# Patient Record
Sex: MALE | Race: ASIAN | NOT HISPANIC OR LATINO | Employment: FULL TIME | URBAN - METROPOLITAN AREA
[De-identification: names, ages, dates, MRNs, and addresses within clinical notes are randomized per-mention and may not be internally consistent; named-entity substitution may affect disease eponyms.]

---

## 2018-02-12 ENCOUNTER — OFFICE VISIT (OUTPATIENT)
Dept: FAMILY MEDICINE | Facility: CLINIC | Age: 58
End: 2018-02-12
Payer: COMMERCIAL

## 2018-02-12 VITALS — DIASTOLIC BLOOD PRESSURE: 79 MMHG | TEMPERATURE: 97.9 F | SYSTOLIC BLOOD PRESSURE: 148 MMHG

## 2018-02-12 DIAGNOSIS — Z71.84 TRAVEL ADVICE ENCOUNTER: Primary | ICD-10-CM

## 2018-02-12 DIAGNOSIS — Z23 NEED FOR VACCINATION: ICD-10-CM

## 2018-02-12 PROCEDURE — 90472 IMMUNIZATION ADMIN EACH ADD: CPT | Mod: GA | Performed by: NURSE PRACTITIONER

## 2018-02-12 PROCEDURE — 90471 IMMUNIZATION ADMIN: CPT | Mod: GA | Performed by: NURSE PRACTITIONER

## 2018-02-12 PROCEDURE — 99402 PREV MED CNSL INDIV APPRX 30: CPT | Mod: 25 | Performed by: NURSE PRACTITIONER

## 2018-02-12 PROCEDURE — 90632 HEPA VACCINE ADULT IM: CPT | Mod: GA | Performed by: NURSE PRACTITIONER

## 2018-02-12 PROCEDURE — 90691 TYPHOID VACCINE IM: CPT | Mod: GA | Performed by: NURSE PRACTITIONER

## 2018-02-12 RX ORDER — ATOVAQUONE AND PROGUANIL HYDROCHLORIDE 250; 100 MG/1; MG/1
1 TABLET, FILM COATED ORAL DAILY
Qty: 80 TABLET | Refills: 0 | Status: SHIPPED | OUTPATIENT
Start: 2018-02-12 | End: 2018-02-28

## 2018-02-12 RX ORDER — HYDROCHLOROTHIAZIDE 25 MG/1
25 TABLET ORAL DAILY
Qty: 90 TABLET | Refills: 3 | COMMUNITY
Start: 2018-02-12 | End: 2019-03-04

## 2018-02-12 RX ORDER — DOXYCYCLINE 100 MG/1
100 TABLET ORAL DAILY
Qty: 108 TABLET | Refills: 0 | Status: SHIPPED | OUTPATIENT
Start: 2018-02-12 | End: 2018-02-28

## 2018-02-12 RX ORDER — SIMVASTATIN 20 MG
20 TABLET ORAL AT BEDTIME
Qty: 30 TABLET | Refills: 1 | COMMUNITY
Start: 2018-02-12 | End: 2018-02-28

## 2018-02-12 NOTE — MR AVS SNAPSHOT
"              After Visit Summary   2/12/2018    Jose Gaines    MRN: [id number]           Patient Information     Date Of Birth          1960        Visit Information        Provider Department      2/12/2018 9:00 AM Crystal Mariscal APRN CNP [facility]        Today's Diagnoses     Travel advice encounter    -  1    Need for vaccination          Care Instructions    Today February 12, 2018 you received the    Hepatitis A Vaccine -     Typhoid - injectable. This vaccine is valid for two years.   .    These appointments can be made as a NURSE ONLY visit.    **It is very important for the vaccinations to be given on the scheduled day(s), this helps ensure you receive the full effectiveness of the vaccine.**    Please call [facility] with any questions [phone]    Thank you for visiting [facility]              Follow-ups after your visit        Who to contact     If you have questions or need follow up information about today's clinic visit or your schedule please contact [facility] directly at [phone].  Normal or non-critical lab and imaging results will be communicated to you by MyChart, letter or phone within 4 business days after the clinic has received the results. If you do not hear from us within 7 days, please contact the clinic through [employer] or phone. If you have a critical or abnormal lab result, we will notify you by phone as soon as possible.  Submit refill requests through Oco or call your pharmacy and they will forward the refill request to us. Please allow 3 business days for your refill to be completed.          Additional Information About Your Visit        MyChart Information     Oco lets you send messages to your doctor, view your test results, renew your prescriptions, schedule appointments and more. To sign up, go to [email or web address] . Click on \"Log in\" on the left side of the screen, which will " "take you to the Welcome page. Then click on \"Sign up Now\" on the right side of the page.     You will be asked to enter the access code listed below, as well as some personal information. Please follow the directions to create your username and password.     Your access code is: [email or web address]  Expires: 2018 10:27 AM     Your access code will  in 90 days. If you need help or a new code, please call your [place] clinic or [phone].        Care EveryWhere ID     This is your Care EveryWhere ID. This could be used by other organizations to access your [place] medical records  [id number]        Your Vitals Were     Temperature                   97.9  F (36.6  C) (Oral)            Blood Pressure from Last 3 Encounters:   18 148/79   12 149/97   08/31/10 131/84    Weight from Last 3 Encounters:   12 147 lb 9.6 oz (67 kg)   08/31/10 145 lb (65.8 kg)              We Performed the Following     HEPA VACCINE ADULT IM     TYPHOID VACCINE, IM          Today's Medication Changes          These changes are accurate as of 18 10:27 AM.  If you have any questions, ask your nurse or doctor.               Start taking these medicines.        Dose/Directions    atovaquone-proguanil 250-100 MG per tablet   Commonly known as:  MALARONE   Used for:  Travel advice encounter   Started by:  Crystal Mariscal APRN CNP        Dose:  1 tablet   Take 1 tablet by mouth daily Start 2 days before exposure to Malaria and continue daily till  7 days after exposure.   Quantity:  80 tablet   Refills:  0       doxycycline Monohydrate 100 MG Tabs   Used for:  Travel advice encounter   Started by:  Crystal Mariscal APRN CNP        Dose:  100 mg   Take 100 mg by mouth daily   Quantity:  108 tablet   Refills:  0            Where to get your medicines      These medications were sent to [id number] - [facility] [place] - [address] AT [facility] & [address], [facility] " [address]     Phone:  [phone]     atovaquone-proguanil 250-100 MG per tablet    doxycycline Monohydrate 100 MG Tabs                Primary Care Provider Office Phone # Fax #    Patrica Salvador Resendez [phone] [address] [facility] [address]        Equal Access to Services     [facility]: Hadii aad ku hadasho Soomaali, waaxda luqadaha, qaybta kaalmada adeegyada, waxay idiin hayaan adeeg kharash la'aan . So [facility] [phone].    ATENCIÓN: Si habla español, tiene a ruth disposición servicios gratuitos de asistencia lingüística. Llame al [phone].    We comply with applicable federal civil rights laws and [place] laws. We do not discriminate on the basis of race, color, national origin, age, disability, sex, sexual orientation, or gender identity.            Thank you!     Thank you for choosing [facility]  for your care. Our goal is always to provide you with excellent care. Hearing back from our patients is one way we can continue to improve our services. Please take a few minutes to complete the written survey that you may receive in the mail after your visit with us. Thank you!             Your Updated Medication List - Protect others around you: Learn how to safely use, store and throw away your medicines at www.disposemymeds.org.          This list is accurate as of 2/12/18 10:27 AM.  Always use your most recent med list.                   Brand Name Dispense Instructions for use Diagnosis    atovaquone-proguanil 250-100 MG per tablet    MALARONE    80 tablet    Take 1 tablet by mouth daily Start 2 days before exposure to Malaria and continue daily till  7 days after exposure.    Travel advice encounter       doxycycline Monohydrate 100 MG Tabs     108 tablet    Take 100 mg by mouth daily    Travel advice encounter       hydrochlorothiazide 25 MG tablet    HYDRODIURIL    90 tablet    Take 1 tablet (25 mg) by mouth daily        simvastatin 20 MG  tablet    ZOCOR    30 tablet    Take 1 tablet (20 mg) by mouth At Bedtime

## 2018-02-12 NOTE — PATIENT INSTRUCTIONS
Today February 12, 2018 you received the    Hepatitis A Vaccine -     Typhoid - injectable. This vaccine is valid for two years.   .    These appointments can be made as a NURSE ONLY visit.    **It is very important for the vaccinations to be given on the scheduled day(s), this helps ensure you receive the full effectiveness of the vaccine.**    Please call [facility] with any questions [phone]    Thank you for visiting [facility]

## 2018-02-12 NOTE — NURSING NOTE
"Chief Complaint   Patient presents with     Travel Clinic     initial /79  Temp 97.9  F (36.6  C) (Oral) Estimated body mass index is 26.15 kg/(m^2) as calculated from the following:    Height as of 8/31/10: 5' 3\" (1.6 m).    Weight as of 9/2/12: 147 lb 9.6 oz (67 kg).  BP completed using cuff size: regular.  L  arm      Health Maintenance that is potentially due pending provider review:  NONE    n/a    Jabier Clark ma  "

## 2018-02-12 NOTE — PROGRESS NOTES
Nurse Note      Itinerary:  [facility]      Departure Date: 3/17/18      Return Date: 5/25/18      Length of Trip 2 MONTHS      Reason for Travel: Visiting friends and relatives           Urban or rural: both      Accommodations: Family home        IMMUNIZATION HISTORY  Have you received any immunizations within the past 4 weeks?  No  Have you ever fainted from having your blood drawn or from an injection?  No  Have you ever had a fever reaction to vaccination?  No  Have you ever had any bad reaction or side effect from any vaccination?  No  Have you ever had hepatitis A or B vaccine?  Yes  Do you live (or work closely) with anyone who has AIDS, an AIDS-like condition, any other immune disorder or who is on chemotherapy for cancer?  No  Do you have a family history of immunodeficiency?  No  Have you received any injection of immune globulin or any blood products during the past 12 months?  No    Patient roomed by Jabier Guerrero  Jose Gaines is a 57 year old male seen today with son for counsultation for international travel to [place] for Visiting friends and relatives.  Patient will be departing in  1 month(s) and staying for   2 month(s) and  traveling alone.      Patient itinerary :  will be in the [place] region of [place] which presents risk for Malaria, Dengue Fever, Chikungungya, Zika, Schistosomiasis, Japanese Encephalitis, Rabies, food borne illnesses, motor vehicle accidents and Typhoid. exposure.      Patient's activities will include visiting friends and relatives.    Patient's country of birth is [place]    Special medical concerns: hep B and liver ([place] [facility])   Pre-travel questionnaire was completed by patient and reviewed by provider.     Vitals: /79  Temp 97.9  F (36.6  C) (Oral)  BMI= There is no height or weight on file to calculate BMI.    EXAM:  General:  Well-nourished, well-developed in no acute distress.  Appears to be stated age, interacts appropriately and expresses  understanding of information given to patient.    Current Outpatient Prescriptions   Medication Sig Dispense Refill     simvastatin (ZOCOR) 20 MG tablet Take 1 tablet (20 mg) by mouth At Bedtime 30 tablet 1     hydrochlorothiazide (HYDRODIURIL) 25 MG tablet Take 1 tablet (25 mg) by mouth daily 90 tablet 3     Patient Active Problem List   Diagnosis     Seasonal allergic rhinitis     CARDIOVASCULAR SCREENING; LDL GOAL LESS THAN 160     No Known Allergies      Immunizations discussed include:   Hepatitis A:  Ordered/given today, risks, benefits and side effects reviewed  Hepatitis B: is positive  Influenza: Declined  Not concerned about risk of disease  Typhoid: Ordered/given today, risks, benefits and side effects reviewed  Rabies: Declined  reviewed managment of a animal bite or scratch (washing wound, seek medical care within 24 hours for post exposure prophylaxis )  Yellow Fever: Not indicated  Japanese Encephalitis: Declined  Cost  Not concerned about risk of disease  Meningococcus: Not indicated  Tetanus/Diphtheria: Up to date  Measles/Mumps/Rubella: Up to date per report  Cholera: Not needed  Polio: Up to date  Pneumococcal: Under age of 65  Varicella: Immune by disease history per patient report  Zostavax:  Not indicated  HPV:  Not indicated  TB:  Post travel testing    Altitude Exposure on this trip: no  Past tolerance to Altitude: na    ASSESSMENT/PLAN:    ICD-10-CM    1. Travel advice encounter Z71.89 HEPA VACCINE ADULT IM     TYPHOID VACCINE, IM     atovaquone-proguanil (MALARONE) 250-100 MG per tablet     doxycycline Monohydrate 100 MG TABS   2. Need for vaccination Z23 HEPA VACCINE ADULT IM     TYPHOID VACCINE, IM     I have reviewed general recommendations for safe travel   including: food/water precautions, insect precautions, safer sex   practices given high prevalence of Zika, HIV and other STDs,   roadway safety. Educational materials and Travax report provided.    Malaraia prophylaxis recommended:  Malarone or Doxy  Symptomatic treatment for traveler's diarrhea: azithromycin  Altitude illness prevention and treatment: noone      Evacuation insurance advised and resources were provided to patient.    Total visit time 30 minutes  with over 50% of time spent counseling patient as detailed above.    Crystal Mariscal CNP

## 2018-02-28 ENCOUNTER — OFFICE VISIT (OUTPATIENT)
Dept: FAMILY MEDICINE | Facility: CLINIC | Age: 58
End: 2018-02-28
Payer: COMMERCIAL

## 2018-02-28 VITALS
TEMPERATURE: 96.8 F | OXYGEN SATURATION: 98 % | HEIGHT: 63 IN | DIASTOLIC BLOOD PRESSURE: 80 MMHG | WEIGHT: 147 LBS | BODY MASS INDEX: 26.05 KG/M2 | SYSTOLIC BLOOD PRESSURE: 132 MMHG | HEART RATE: 71 BPM

## 2018-02-28 DIAGNOSIS — B18.1 CHRONIC HEPATITIS B (H): ICD-10-CM

## 2018-02-28 DIAGNOSIS — Z12.11 SCREEN FOR COLON CANCER: ICD-10-CM

## 2018-02-28 DIAGNOSIS — G89.29 CHRONIC PAIN OF RIGHT KNEE: ICD-10-CM

## 2018-02-28 DIAGNOSIS — E78.5 HYPERLIPIDEMIA, UNSPECIFIED HYPERLIPIDEMIA TYPE: ICD-10-CM

## 2018-02-28 DIAGNOSIS — H66.002 ACUTE SUPPURATIVE OTITIS MEDIA OF LEFT EAR WITHOUT SPONTANEOUS RUPTURE OF TYMPANIC MEMBRANE, RECURRENCE NOT SPECIFIED: ICD-10-CM

## 2018-02-28 DIAGNOSIS — M25.561 CHRONIC PAIN OF RIGHT KNEE: ICD-10-CM

## 2018-02-28 DIAGNOSIS — I10 ESSENTIAL HYPERTENSION: ICD-10-CM

## 2018-02-28 DIAGNOSIS — Z75.8 LANGUAGE BARRIER: ICD-10-CM

## 2018-02-28 DIAGNOSIS — Z60.3 LANGUAGE BARRIER: ICD-10-CM

## 2018-02-28 DIAGNOSIS — Z00.00 ROUTINE HISTORY AND PHYSICAL EXAMINATION OF ADULT: Primary | ICD-10-CM

## 2018-02-28 PROBLEM — E66.3 OVERWEIGHT: Status: ACTIVE | Noted: 2018-02-28

## 2018-02-28 LAB
ANION GAP SERPL CALCULATED.3IONS-SCNC: 8 MMOL/L (ref 3–14)
BUN SERPL-MCNC: 14 MG/DL (ref 7–30)
CALCIUM SERPL-MCNC: 8.4 MG/DL (ref 8.5–10.1)
CHLORIDE SERPL-SCNC: 102 MMOL/L (ref 94–109)
CHOLEST SERPL-MCNC: 226 MG/DL
CO2 SERPL-SCNC: 27 MMOL/L (ref 20–32)
CREAT SERPL-MCNC: 0.76 MG/DL (ref 0.66–1.25)
CREAT UR-MCNC: 89 MG/DL
GFR SERPL CREATININE-BSD FRML MDRD: >90 ML/MIN/1.7M2
GLUCOSE SERPL-MCNC: 88 MG/DL (ref 70–99)
HDLC SERPL-MCNC: 93 MG/DL
LDLC SERPL CALC-MCNC: 118 MG/DL
MICROALBUMIN UR-MCNC: 20 MG/L
MICROALBUMIN/CREAT UR: 22.13 MG/G CR (ref 0–17)
NONHDLC SERPL-MCNC: 133 MG/DL
POTASSIUM SERPL-SCNC: 3.7 MMOL/L (ref 3.4–5.3)
SODIUM SERPL-SCNC: 137 MMOL/L (ref 133–144)
TRIGL SERPL-MCNC: 74 MG/DL

## 2018-02-28 PROCEDURE — 99213 OFFICE O/P EST LOW 20 MIN: CPT | Mod: 25 | Performed by: NURSE PRACTITIONER

## 2018-02-28 PROCEDURE — 82043 UR ALBUMIN QUANTITATIVE: CPT | Performed by: NURSE PRACTITIONER

## 2018-02-28 PROCEDURE — 99396 PREV VISIT EST AGE 40-64: CPT | Performed by: NURSE PRACTITIONER

## 2018-02-28 PROCEDURE — 80048 BASIC METABOLIC PNL TOTAL CA: CPT | Performed by: NURSE PRACTITIONER

## 2018-02-28 PROCEDURE — 80061 LIPID PANEL: CPT | Performed by: NURSE PRACTITIONER

## 2018-02-28 PROCEDURE — 36415 COLL VENOUS BLD VENIPUNCTURE: CPT | Performed by: NURSE PRACTITIONER

## 2018-02-28 RX ORDER — AMOXICILLIN 875 MG
875 TABLET ORAL 2 TIMES DAILY
Qty: 20 TABLET | Refills: 0 | Status: SHIPPED | OUTPATIENT
Start: 2018-02-28 | End: 2018-10-25

## 2018-02-28 SDOH — SOCIAL STABILITY - SOCIAL INSECURITY: ACCULTURATION DIFFICULTY: Z60.3

## 2018-02-28 NOTE — LETTER
March 2, 2018      Jose Gaines  [address]  [place] [address]            Hi Jose,    Your urine microalbumin is elevated and this is likely due to your high blood pressure.  Be sure to take your medication as prescribed, consume a low salt diet and get regular exercise as we discussed at your visit.    Your metabolic panel was normal for you.    Your cholesterol was abnormal. Genetics, diet, weight and low exercise levels can contribute to this.  Elevated LDL cholesterol and triglycerides as well as low HDL cholesterol all increase a person's risk for heart and vascular disease.     I recommend you start a low fat and low cholesterol diet, weight loss and regular exercise to see if you can't improve this a bit.  Recheck in 6 months.    Feel free to contact me with any questions or concerns.  Thank you for allowing me to participate in your care.    Concepcion NAPOLES, CNP/ag                                          Results for orders placed or performed in visit on 02/28/18   Basic metabolic panel  (Ca, Cl, CO2, Creat, Gluc, K, Na, BUN)   Result Value Ref Range    Sodium 137 133 - 144 mmol/L    Potassium 3.7 3.4 - 5.3 mmol/L    Chloride 102 94 - 109 mmol/L    Carbon Dioxide 27 20 - 32 mmol/L    Anion Gap 8 3 - 14 mmol/L    Glucose 88 70 - 99 mg/dL    Urea Nitrogen 14 7 - 30 mg/dL    Creatinine 0.76 0.66 - 1.25 mg/dL    GFR Estimate >90 >60 mL/min/1.7m2    GFR Estimate If Black >90 >60 mL/min/1.7m2    Calcium 8.4 (L) 8.5 - 10.1 mg/dL   Lipid Profile (Chol, Trig, HDL, LDL calc)   Result Value Ref Range    Cholesterol 226 (H) <200 mg/dL    Triglycerides 74 <150 mg/dL    HDL Cholesterol 93 >39 mg/dL    LDL Cholesterol Calculated 118 (H) <100 mg/dL    Non HDL Cholesterol 133 (H) <130 mg/dL   Albumin Random Urine Quantitative with Creat Ratio   Result Value Ref Range    Creatinine Urine 89 mg/dL    Albumin Urine mg/L 20 mg/L    Albumin Urine mg/g Cr 22.13 (H) 0 - 17 mg/g Cr

## 2018-02-28 NOTE — MR AVS SNAPSHOT
After Visit Summary   2/28/2018    Jose Gaines    MRN: [id number]           Patient Information     Date Of Birth          1960        Visit Information        Provider Department      2/28/2018 10:40 AM Concepcion Lyons APRN CNP [facility]        Today's Diagnoses     Routine history and physical examination of adult    -  1    Screen for colon cancer        Chronic hepatitis B (H)        Acute suppurative otitis media of left ear without spontaneous rupture of tympanic membrane, recurrence not specified        Hyperlipidemia, unspecified hyperlipidemia type        Essential hypertension        Language barrier          Care Instructions    At [facility], we strive to deliver an exceptional experience to you, every time we see you.  If you receive a survey in the mail, please send us back your thoughts. We really do value your feedback.    Based on your medical history, these are the current health maintenance/preventive care services that you are due for (some may have been done at this visit.)  Health Maintenance Due   Topic Date Due     HEPATITIS C SCREENING  06/06/1978     LIPID SCREEN Q5 YR MALE (SYSTEM ASSIGNED)  06/06/1995     COLON CANCER SCREEN (SYSTEM ASSIGNED)  06/06/2010     ADVANCE DIRECTIVE PLANNING Q5 YRS  06/06/2015         Suggested websites for health information:  Www.Kaymbu.WhichSocial.com : Up to date and easily searchable information on multiple topics.  Www.medlineplus.gov : medication info, interactive tutorials, watch real surgeries online  Www.familydoctor.org : good info from the Academy of Family Physicians  Www.cdc.gov : public health info, travel advisories, epidemics (H1N1)  Www.aap.org : children's health info, normal development, vaccinations  Www.health.state.[place].us : [place] dept of health, public health issues in [place], N1N1    Your care team:                            Family Medicine Internal Medicine   MD Ky Crawley  MD Carito Stone MD Katya Georgiev PA-C Nam Ho, MD Pediatrics   CURTIS Davis, ABELARDO Denton APRMD Ronda Solomon CNP, MD Deborah Mielke, MD Kim Thein, APRN [facility]      Clinic hours: Monday - Thursday 7 am-7 pm; Fridays 7 am-5 pm.   Urgent care: Monday - Friday 11 am-9 pm; Saturday and Sunday 9 am-5 pm.  Pharmacy : Monday -Thursday 8 am-8 pm; Friday 8 am-6 pm; Saturday and Sunday 9 am-5 pm.     Clinic: [phone]   Pharmacy: [phone]    Preventive Health Recommendations  Male Ages 50 - 64    Yearly exam:             See your health care provider every year in order to  o   Review health changes.   o   Discuss preventive care.    o   Review your medicines if your doctor has prescribed any.     Have a cholesterol test every 5 years, or more frequently if you are at risk for high cholesterol/heart disease.     Have a diabetes test (fasting glucose) every three years. If you are at risk for diabetes, you should have this test more often.     Have a colonoscopy at age 50, or have a yearly FIT test (stool test). These exams will check for colon cancer.      Talk with your health care provider about whether or not a prostate cancer screening test (PSA) is right for you.    You should be tested each year for STDs (sexually transmitted diseases), if you re at risk.     Shots: Get a flu shot each year. Get a tetanus shot every 10 years.     Nutrition:    Eat at least 5 servings of fruits and vegetables daily.     Eat whole-grain bread, whole-wheat pasta and brown rice instead of white grains and rice.     Talk to your provider about Calcium and Vitamin D.     Lifestyle    Exercise for at least 150 minutes a week (30 minutes a day, 5 days a week). This will help you control your weight and prevent disease.     Limit alcohol to one drink per day.     No smoking.     Wear sunscreen to prevent skin cancer.     See your dentist every six months  "for an exam and cleaning.     See your eye doctor every 1 to 2 years.            Follow-ups after your visit        Who to contact     If you have questions or need follow up information about today's clinic visit or your schedule please contact [facility] directly at [phone].  Normal or non-critical lab and imaging results will be communicated to you by MyChart, letter or phone within 4 business days after the clinic has received the results. If you do not hear from us within 7 days, please contact the clinic through MyChart or phone. If you have a critical or abnormal lab result, we will notify you by phone as soon as possible.  Submit refill requests through [employer] or call your pharmacy and they will forward the refill request to us. Please allow 3 business days for your refill to be completed.          Additional Information About Your Visit        MyChart Information     [employer] lets you send messages to your doctor, view your test results, renew your prescriptions, schedule appointments and more. To sign up, go to [email or web address] . Click on \"Log in\" on the left side of the screen, which will take you to the Welcome page. Then click on \"Sign up Now\" on the right side of the page.     You will be asked to enter the access code listed below, as well as some personal information. Please follow the directions to create your username and password.     Your access code is: [email or web address]  Expires: 2018 10:27 AM     Your access code will  in 90 days. If you need help or a new code, please call your [place] clinic or [phone].        Care EveryWhere ID     This is your Care EveryWhere ID. This could be used by other organizations to access your [place] medical records  [id number]        Your Vitals Were     Pulse Temperature Height Pulse Oximetry BMI (Body Mass Index)       71 96.8  F (36  C) (Oral) 5' 2.6\" (1.59 m) 98% 26.38 kg/m2        Blood Pressure from Last 3 " Encounters:   02/28/18 132/80   02/12/18 148/79   09/02/12 149/97    Weight from Last 3 Encounters:   02/28/18 147 lb (66.7 kg)   09/02/12 147 lb 9.6 oz (67 kg)   08/31/10 145 lb (65.8 kg)              We Performed the Following     Albumin Random Urine Quantitative with Creat Ratio     Basic metabolic panel  (Ca, Cl, CO2, Creat, Gluc, K, Na, BUN)     Lipid Profile (Chol, Trig, HDL, LDL calc)          Today's Medication Changes          These changes are accurate as of 2/28/18 11:43 AM.  If you have any questions, ask your nurse or doctor.               Start taking these medicines.        Dose/Directions    amoxicillin 875 MG tablet   Commonly known as:  AMOXIL   Used for:  Acute suppurative otitis media of left ear without spontaneous rupture of tympanic membrane, recurrence not specified   Started by:  Concepcion Lyons APRN CNP        Dose:  875 mg   Take 1 tablet (875 mg) by mouth 2 times daily   Quantity:  20 tablet   Refills:  0            Where to get your medicines      These medications were sent to [email or web address] - [place], [place] - [address] AT [facility] [address], [facility] [address]     Phone:  [phone]     amoxicillin 875 MG tablet                Primary Care Provider Office Phone # Fax #    Patrica Salvador Resendez [phone] [address] [facility] [address]        Equal Access to Services     [facility]: Hadii aad ku hadasho Soomaali, waaxda luqadaha, qaybta kaalmada adeegyada, julieth rebolledo. So [facility] [phone].    ATENCIÓN: Si habla español, tiene a ruth disposición servicios gratuitos de asistencia lingüística. Llame al [phone].    We comply with applicable federal civil rights laws and [place] laws. We do not discriminate on the basis of race, color, national origin, age, disability, sex, sexual orientation, or gender identity.            Thank you!     Thank you for  choosing [facility]  for your care. Our goal is always to provide you with excellent care. Hearing back from our patients is one way we can continue to improve our services. Please take a few minutes to complete the written survey that you may receive in the mail after your visit with us. Thank you!             Your Updated Medication List - Protect others around you: Learn how to safely use, store and throw away your medicines at www.disposemymeds.org.          This list is accurate as of 2/28/18 11:43 AM.  Always use your most recent med list.                   Brand Name Dispense Instructions for use Diagnosis    amoxicillin 875 MG tablet    AMOXIL    20 tablet    Take 1 tablet (875 mg) by mouth 2 times daily    Acute suppurative otitis media of left ear without spontaneous rupture of tympanic membrane, recurrence not specified       hydrochlorothiazide 25 MG tablet    HYDRODIURIL    90 tablet    Take 1 tablet (25 mg) by mouth daily

## 2018-02-28 NOTE — PROGRESS NOTES
SUBJECTIVE:   CC: Jose Gaines is an 57 year old male who presents for preventative health visit.     Healthy Habits:    Do you get at least three servings of calcium containing foods daily (dairy, green leafy vegetables, etc.)? yes    Amount of exercise or daily activities, outside of work: 3 day(s) per week, 45 minutes - hour at least a couple times a week    Problems taking medications regularly No    Medication side effects: No    Have you had an eye exam in the past two years? yes    Do you see a dentist twice per year? yes    Do you have sleep apnea, excessive snoring or daytime drowsiness?no     Headache  Duration of complaint: several times a week, concurrent neck/shoulder musculoskeletal pain   Description:   Location: unilateral in the right occipital area   Character: throbbing pain  Frequency:  2-3 times a week  Duration:  1 hour or so  Intensity: moderate  Progression of Symptoms:  same  Accompanying Signs & Symptoms:  Stiff neck: YES  Neck or upper back pain: YES  Fever: no   Sinus pressure: no   Nausea or vomiting: no   Dizziness: no   Numbness: no   Weakness: no   Visual changes: YES - blurred vision  History:   Head trauma: no   Family history of migraines: no   Previous tests for headaches: no   Neurologist evaluations: no   Able to do daily activities: YES  Wake with a headaches: no   Do headaches wake you up: no   Daily pain medication use: no   Work/school stressors/changes: YES - wife passed way two months ago from cancer  Precipitating factors:   Does light make it worse: no   Does sound make it worse: no   Alleviating factors:  Does sleep help: no   Therapies Tried and outcome: Ibuprofen (Advil, Motrin) - one tablet with resolution of symptoms    Acute Otitis Media  Reports left ear pain for several days and some sinus congestion.     HPI: Jose presents today for an annual wellness exam. He notes that he is also having some right knee pain which is chronic since having arthroplasty in  "2005. He has not been taking anything for it and notes that it occurs when he has been doing a lot of activity.     Additionally, Jose reports that he has tension headaches, several times a week, worse when feeling stressed. He states he is concerned that his blood pressure could be influencing them as he also notes concurrent blurry vision sometimes, no chest pain, shortness of breath, altered LOC, etc. He stated he takes only one tablet of ibuprofen when he experiences these and that seems to help. He reports still taking his Hydrochlorathiazide as prescribed for known hypertension. He does endorse drinking 1-2 cups of coffee a few days a week; does not notice worsening of headaches on days when he is not drinking caffeine.     Works full-time at a \"[employer]\" company - has been there about 11 years. He has three kids, fully grown, one does still live at home and has been helping out since his wife passed away from cancer two months ago. He states he is doing alright since then, helpful to have his son around.     Today's PHQ-2 Score: No flowsheet data found.    Abuse: Current or Past(Physical, Sexual or Emotional)- No  Do you feel safe in your environment - Yes    Social History   Substance Use Topics     Smoking status: Never Smoker     Smokeless tobacco: Never Used     Alcohol use Yes      If you drink alcohol do you typically have >3 drinks per day or >7 drinks per week? No                      Last PSA: No results found for: PSA    Reviewed orders with patient. Reviewed health maintenance and updated orders accordingly - Yes  Labs reviewed in EPIC  BP Readings from Last 3 Encounters:   02/28/18 132/80   02/12/18 148/79   09/02/12 149/97    Wt Readings from Last 3 Encounters:   02/28/18 147 lb (66.7 kg)   09/02/12 147 lb 9.6 oz (67 kg)   08/31/10 145 lb (65.8 kg)                  Patient Active Problem List   Diagnosis     Seasonal allergic rhinitis     CARDIOVASCULAR SCREENING; LDL GOAL LESS THAN 130     Chronic " hepatitis B (H)     Essential hypertension     Hyperlipidemia     Internal hemorrhoid     Language barrier     Oral ulcer     Overweight     Past Surgical History:   Procedure Laterality Date     SURGICAL HISTORY OF -   1/31/05 & 8/05    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       Social History   Substance Use Topics     Smoking status: Never Smoker     Smokeless tobacco: Never Used     Alcohol use Yes     Family History   Problem Relation Age of Onset     DIABETES Father 50     DIABETES Brother      unknown age of onset         Current Outpatient Prescriptions   Medication Sig Dispense Refill     amoxicillin (AMOXIL) 875 MG tablet Take 1 tablet (875 mg) by mouth 2 times daily 20 tablet 0     hydrochlorothiazide (HYDRODIURIL) 25 MG tablet Take 1 tablet (25 mg) by mouth daily 90 tablet 3       Reviewed and updated as needed this visit by clinical staff  Tobacco  Allergies  Meds  Med Hx  Surg Hx  Fam Hx  Soc Hx        Reviewed and updated as needed this visit by Provider  [facility] Hx        Past Medical History:   Diagnosis Date     Allergic rhinitis      ED (erectile dysfunction)       Past Surgical History:   Procedure Laterality Date     SURGICAL HISTORY OF -   1/31/05 & 8/05    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       ROS:  C: NEGATIVE for fever, chills, change in weight  I: NEGATIVE for worrisome rashes, moles or lesions  E: NEGATIVE for vision changes or irritation  ENT: NEGATIVE for mouth and throat problems, mild left ear pain  R: NEGATIVE for significant cough or SOB  CV: NEGATIVE for chest pain, palpitations or peripheral edema  GI: NEGATIVE for nausea, abdominal pain, heartburn, or change in bowel habits   male: negative for dysuria, hematuria, decreased urinary stream, erectile dysfunction, urethral discharge  M: NEGATIVE for significant arthralgias or myalgia  N: NEGATIVE for weakness, dizziness or paresthesias  P: NEGATIVE for changes in mood or affect    OBJECTIVE:   /80 (BP Location: Left arm, Patient Position:  "Chair, Cuff Size: Adult Regular)  Pulse 71  Temp 96.8  F (36  C) (Oral)  Ht 5' 2.6\" (1.59 m)  Wt 147 lb (66.7 kg)  SpO2 98%  BMI 26.38 kg/m2  EXAM:  GENERAL: healthy, alert and no distress  EYES: Eyes grossly normal to inspection, PERRL, EOMI, visual fields normal and sclera slightly erythematous  HENT: normal cephalic/atraumatic, right ear: normal: no effusions, no erythema, normal landmarks, left ear:  no effusions, cloudy/abnormal landmarks and erythematous internal auditory canal & tympanic membrane, nose and mouth without ulcers or lesions, oropharynx with clear drainage, and oral mucous membranes moist. Negative for sinus pain or pressure.   NECK: no adenopathy, no asymmetry, masses, or scars and thyroid normal to palpation  RESP: lungs clear to auscultation - no rales, rhonchi or wheezes  CV: regular rate and rhythm, normal S1 S2, no S3 or S4, no murmur, click or rub, no peripheral edema and peripheral pulses strong  ABDOMEN: soft, nontender, no hepatosplenomegaly, no masses and bowel sounds normal   (male): normal male genitalia without lesions or urethral discharge, no hernia  RECTAL: normal sphincter tone, no rectal masses, prostate normal size, smooth, nontender without nodules or masses  MS: crepitus with left knee extension, otherwise extremities grossly normal, normal gait, strength & ROM intake in BLE  SKIN: no suspicious lesions or rashes and scar - lower legs; bilateral scarring at patellar plate, several cm size round scars on lower legs which he attributes to bumping into things.  NEURO: Normal strength and tone, mentation intact and speech normal  PSYCH: mentation appears normal, affect normal/bright  LYMPH: no cervical, supraclavicular, axillary, or inguinal adenopathy    ASSESSMENT/PLAN:   1. Routine history and physical examination of adult    2. Screen for colon cancer  Colonoscopy 9/2/16 - polyp removed, pathology negative - next colonoscopy in about 8 years.    3. Chronic hepatitis " "B (H)  Followed by GI    4. Acute suppurative otitis media of left ear without spontaneous rupture of tympanic membrane, recurrence not specified  Erythema of the internal auditory canal and tympanic membrane consistent with acute otitis media. Treated with course of amoxicillin as below - patient denies any known allergies.     - amoxicillin (AMOXIL) 875 MG tablet; Take 1 tablet (875 mg) by mouth 2 times daily  Dispense: 20 tablet; Refill: 0    5. Hyperlipidemia, unspecified hyperlipidemia type  Reported history of hyperlipidemia from outside records, not currently on medications, last lipid profile from 2011 (elevated total cholesterol & triglycerides, elevated LDL of 122. Patient has been fasting since midnight, will repeat lipid profile and BMP today and update patient with results.     - Basic metabolic panel  (Ca, Cl, CO2, Creat, Gluc, K, Na, BUN)  - Lipid profile    6. Essential hypertension  On Hydrochlorithiazide - 25 mg once daily. Educated patient on the importance of taking in the morning to avoid it waking him up to go to the bathroom.     - Albumin Random Urine Quantitative with Creat Ratio  - BMP    7. Chronic pain of the right knee  Crepitus with extension and pain of the right knee that worsens with activity approximately 13 years s/p arthroscopy. X-ray of the right knee WNL 3/30/17 [facility]. Likely developing arthritis. Advised to take 400mg Ibuprofen with food every 6-8 hours as needed for pain, return to clinic if not improved, new, or worsening symptoms.      8. Language barrier  From [place] - could benefit from , although declined.       COUNSELING:  Reviewed preventive health counseling, as reflected in patient instructions       reports that he has never smoked. He has never used smokeless tobacco.    Estimated body mass index is 26.38 kg/(m^2) as calculated from the following:    Height as of this encounter: 5' 2.6\" (1.59 m).    Weight as of this encounter: 147 lb (66.7 " kg).   Weight management plan: Discussed healthy diet and exercise guidelines and patient will follow up in 12 months in clinic to re-evaluate.    Counseling Resources:  ATP IV Guidelines  Pooled Cohorts Equation Calculator  FRAX Risk Assessment  ICSI Preventive Guidelines  Dietary Guidelines for Americans, 2010  USDA's MyPlate  ASA Prophylaxis  Lung CA Screening    The above evaluation was completed by YESIKA Mcgee CNP and transcribed by [employer] [place] student.     YESIKA Gama CNP  [facility]

## 2018-02-28 NOTE — PATIENT INSTRUCTIONS
At [facility], we strive to deliver an exceptional experience to you, every time we see you.  If you receive a survey in the mail, please send us back your thoughts. We really do value your feedback.    Based on your medical history, these are the current health maintenance/preventive care services that you are due for (some may have been done at this visit.)  Health Maintenance Due   Topic Date Due     HEPATITIS C SCREENING  06/06/1978     LIPID SCREEN Q5 YR MALE (SYSTEM ASSIGNED)  06/06/1995     COLON CANCER SCREEN (SYSTEM ASSIGNED)  06/06/2010     ADVANCE DIRECTIVE PLANNING Q5 YRS  06/06/2015         Suggested websites for health information:  Www.[place].org : Up to date and easily searchable information on multiple topics.  Www.medlineplus.gov : medication info, interactive tutorials, watch real surgeries online  Www.familydoctor.org : good info from the Academy of Family Physicians  Www.cdc.gov : public health info, travel advisories, epidemics (H1N1)  Www.aap.org : children's health info, normal development, vaccinations  Www.health.[facility].[place].us : [place] dept of health, public health issues in [place], N1N1    Your care team:                            Family Medicine Internal Medicine   MD Ky Crawley MD Shantel Branch-Fleming, MD Katya Georgiev PA-C Nam Ho, MD Pediatrics   CURTIS Davis, ABELARDO Denton APRMD Ronda Solomon CNP, MD Deborah Mielke, MD Kim Thein, APRN [facility]      Clinic hours: Monday - Thursday 7 am-7 pm; Fridays 7 am-5 pm.   Urgent care: Monday - Friday 11 am-9 pm; Saturday and Sunday 9 am-5 pm.  Pharmacy : Monday -Thursday 8 am-8 pm; Friday 8 am-6 pm; Saturday and Sunday 9 am-5 pm.     Clinic: [phone]   Pharmacy: [phone]    Preventive Health Recommendations  Male Ages 50   64    Yearly exam:             See your health care provider every year in order to  o   Review health changes.   o    Discuss preventive care.    o   Review your medicines if your doctor has prescribed any.     Have a cholesterol test every 5 years, or more frequently if you are at risk for high cholesterol/heart disease.     Have a diabetes test (fasting glucose) every three years. If you are at risk for diabetes, you should have this test more often.     Have a colonoscopy at age 50, or have a yearly FIT test (stool test). These exams will check for colon cancer.      Talk with your health care provider about whether or not a prostate cancer screening test (PSA) is right for you.    You should be tested each year for STDs (sexually transmitted diseases), if you re at risk.     Shots: Get a flu shot each year. Get a tetanus shot every 10 years.     Nutrition:    Eat at least 5 servings of fruits and vegetables daily.     Eat whole-grain bread, whole-wheat pasta and brown rice instead of white grains and rice.     Talk to your provider about Calcium and Vitamin D.     Lifestyle    Exercise for at least 150 minutes a week (30 minutes a day, 5 days a week). This will help you control your weight and prevent disease.     Limit alcohol to one drink per day.     No smoking.     Wear sunscreen to prevent skin cancer.     See your dentist every six months for an exam and cleaning.     See your eye doctor every 1 to 2 years.

## 2018-03-05 ENCOUNTER — OFFICE VISIT (OUTPATIENT)
Dept: OPTOMETRY | Facility: CLINIC | Age: 58
End: 2018-03-05
Payer: COMMERCIAL

## 2018-03-05 DIAGNOSIS — H04.123 INSUFFICIENCY OF TEAR FILM OF BOTH EYES: Primary | ICD-10-CM

## 2018-03-05 DIAGNOSIS — H10.13 ALLERGIC CONJUNCTIVITIS, BILATERAL: ICD-10-CM

## 2018-03-05 PROCEDURE — 99203 OFFICE O/P NEW LOW 30 MIN: CPT | Performed by: OPTOMETRIST

## 2018-03-05 RX ORDER — LOTEPREDNOL ETABONATE 5 MG/ML
1 SUSPENSION/ DROPS OPHTHALMIC 4 TIMES DAILY
Qty: 1 BOTTLE | Refills: 0 | Status: SHIPPED | OUTPATIENT
Start: 2018-03-05 | End: 2018-10-25

## 2018-03-05 RX ORDER — VIT C/E/ZN/COPPR/LUTEIN/ZEAXAN 60 MG-6 MG
1 CAPSULE ORAL DAILY
Qty: 30 CAPSULE | Refills: 11 | Status: SHIPPED | OUTPATIENT
Start: 2018-03-05

## 2018-03-05 ASSESSMENT — VISUAL ACUITY
METHOD: SNELLEN - LINEAR
OS_SC: 20/25
OD_SC: 20/25
OS_SC+: -2

## 2018-03-05 ASSESSMENT — TONOMETRY
OD_IOP_MMHG: 14
OS_IOP_MMHG: 12
IOP_METHOD: TONOPEN

## 2018-03-05 ASSESSMENT — SLIT LAMP EXAM - LIDS
COMMENTS: MEIBOMIAN GLAND DYSFUNCTION
COMMENTS: MEIBOMIAN GLAND DYSFUNCTION

## 2018-03-05 ASSESSMENT — EXTERNAL EXAM - LEFT EYE: OS_EXAM: NORMAL

## 2018-03-05 ASSESSMENT — EXTERNAL EXAM - RIGHT EYE: OD_EXAM: NORMAL

## 2018-03-05 NOTE — PROGRESS NOTES
Chief Complaint   Patient presents with     Eye Problem     pain and itchy x 2 months       HPI    Affected eye(s):  Both   Symptoms:     Redness   Itching   Burning   Photophobia   Eye discharge      Duration:  2 months   Frequency:  Intermittent       Do you have eye pain now?:  Yes   Location:  OU   Pain Level:  Moderate Pain (5)   Pain Duration:  2 months   Pain Frequency:  Intermittent   Pain Characteristics:  Burning              Review of Symptoms    EYES: See HPI  CONSTITUTIONAL: patient reports feeling healthy  Hx of flu- eyes seemed a little worse at that time  Headaches- +  - takes ibuprofren    Last eye exam- last month-    Medical, surgical and family histories reviewed and updated 3/5/2018.       OBJECTIVE: See Ophthalmology exam    ASSESSMENT:    ICD-10-CM    1. Insufficiency of tear film of both eyes H04.123 loteprednol (LOTEMAX) 0.5 % ophthalmic susp     multivitamin  with lutein (OCUVITE WITH LTEIN) CAPS per capsule   2. Allergic conjunctivitis, bilateral H10.13 loteprednol (LOTEMAX) 0.5 % ophthalmic susp      PLAN:     Patient Instructions   Lotemax- or FML- 1 drop both eyes 4 x day for 1 week.    Return in 1 week for recheck.  Discuss artificial tears and allergy drop at that visit.    Zion Zaldivar, OD

## 2018-03-05 NOTE — MR AVS SNAPSHOT
"              After Visit Summary   3/5/2018    Jose Gaines    MRN: [id number]           Patient Information     Date Of Birth          1960        Visit Information        Provider Department      3/5/2018 8:20 AM Zion Zaldivar, [facility]        Today's Diagnoses     Insufficiency of tear film of both eyes    -  1    Allergic conjunctivitis, bilateral          Care Instructions    Lotemax- or FML- 1 drop both eyes 4 x day for 1 week.    Return in 1 week for recheck.  Discuss artificial tears and allergy drop at that visit.    Zion Zaldivar OD              Follow-ups after your visit        Follow-up notes from your care team     Return in about 1 week (around 3/12/2018), or if symptoms worsen or fail to improve, for Follow Up.      Who to contact     If you have questions or need follow up information about today's clinic visit or your schedule please contact [facility] directly at [phone].  Normal or non-critical lab and imaging results will be communicated to you by MyChart, letter or phone within 4 business days after the clinic has received the results. If you do not hear from us within 7 days, please contact the clinic through [employer] or phone. If you have a critical or abnormal lab result, we will notify you by phone as soon as possible.  Submit refill requests through [employer] or call your pharmacy and they will forward the refill request to us. Please allow 3 business days for your refill to be completed.          Additional Information About Your Visit        [employer] Information     [employer] lets you send messages to your doctor, view your test results, renew your prescriptions, schedule appointments and more. To sign up, go to [email or web address] . Click on \"Log in\" on the left side of the screen, which will take you to the Welcome page. Then click on \"Sign up Now\" on the right side of the page.     You will be asked to enter the access code listed " below, as well as some personal information. Please follow the directions to create your username and password.     Your access code is: [email or web address]  Expires: 2018 10:27 AM     Your access code will  in 90 days. If you need help or a new code, please call your [facility] or [phone].        Care EveryWhere ID     This is your Care EveryWhere ID. This could be used by other organizations to access your [facility] medical records  [id number]         Blood Pressure from Last 3 Encounters:   18 132/80   18 148/79   12 149/97    Weight from Last 3 Encounters:   18 66.7 kg (147 lb)   12 67 kg (147 lb 9.6 oz)   08/31/10 65.8 kg (145 lb)              Today, you had the following     No orders found for display         Today's Medication Changes          These changes are accurate as of 3/5/18  9:12 AM.  If you have any questions, ask your nurse or doctor.               Start taking these medicines.        Dose/Directions    loteprednol 0.5 % ophthalmic susp   Commonly known as:  LOTEMAX   Used for:  Insufficiency of tear film of both eyes, Allergic conjunctivitis, bilateral        Dose:  1 drop   Place 1 drop into both eyes 4 times daily for 7 days   Quantity:  1 Bottle   Refills:  0       multivitamin  with lutein Caps per capsule   Used for:  Insufficiency of tear film of both eyes        Dose:  1 capsule   Take 1 capsule by mouth daily   Quantity:  30 capsule   Refills:  11            Where to get your medicines      These medications were sent to [id number] - [place], [place] [address] AT [facility] & [address], [facility] [address]     Phone:  [phone]     loteprednol 0.5 % ophthalmic susp    multivitamin  with lutein Caps per capsule                Primary Care Provider Office Phone # Fax #    Patrica Resendez [phone] [address] [facility] [address]        Equal Access to  Services     [facility]: Hadii aad ku hadlizettemaximiliano Eveliavikki, waaxda luqadaha, qaybta kaalmada piyush, julieth garner . So [facility] [phone].    ATENCIÓN: Si reynala leonardo, tiene a ruth disposición servicios gratuitos de asistencia lingüística. Llame al [phone].    We comply with applicable federal civil rights laws and [place] laws. We do not discriminate on the basis of race, color, national origin, age, disability, sex, sexual orientation, or gender identity.            Thank you!     Thank you for choosing [facility]  for your care. Our goal is always to provide you with excellent care. Hearing back from our patients is one way we can continue to improve our services. Please take a few minutes to complete the written survey that you may receive in the mail after your visit with us. Thank you!             Your Updated Medication List - Protect others around you: Learn how to safely use, store and throw away your medicines at www.disposemymeds.org.          This list is accurate as of 3/5/18  9:12 AM.  Always use your most recent med list.                   Brand Name Dispense Instructions for use Diagnosis    amoxicillin 875 MG tablet    AMOXIL    20 tablet    Take 1 tablet (875 mg) by mouth 2 times daily    Acute suppurative otitis media of left ear without spontaneous rupture of tympanic membrane, recurrence not specified       hydrochlorothiazide 25 MG tablet    HYDRODIURIL    90 tablet    Take 1 tablet (25 mg) by mouth daily        loteprednol 0.5 % ophthalmic susp    LOTEMAX    1 Bottle    Place 1 drop into both eyes 4 times daily for 7 days    Insufficiency of tear film of both eyes, Allergic conjunctivitis, bilateral       multivitamin  with lutein Caps per capsule     30 capsule    Take 1 capsule by mouth daily    Insufficiency of tear film of both eyes

## 2018-03-05 NOTE — PATIENT INSTRUCTIONS
Lotemax- or FML- 1 drop both eyes 4 x day for 1 week.    Return in 1 week for recheck.  Discuss artificial tears and allergy drop at that visit.    Zion Zaldivar, OD

## 2018-03-05 NOTE — LETTER
3/5/2018         RE: Jose Gaines  [address]  [place] [address]        Dear Colleague,    Thank you for referring your patient, Jose Gaines, to the [facility]. Please see a copy of my visit note below.    Chief Complaint   Patient presents with     Eye Problem     pain and itchy x 2 months       HPI    Affected eye(s):  Both   Symptoms:     Redness   Itching   Burning   Photophobia   Eye discharge      Duration:  2 months   Frequency:  Intermittent       Do you have eye pain now?:  Yes   Location:  OU   Pain Level:  Moderate Pain (5)   Pain Duration:  2 months   Pain Frequency:  Intermittent   Pain Characteristics:  Burning              Review of Symptoms    EYES: See HPI  CONSTITUTIONAL: patient reports feeling healthy  Hx of flu- eyes seemed a little worse at that time  Headaches- +  - takes ibuprofren    Last eye exam- last month-    Medical, surgical and family histories reviewed and updated 3/5/2018.       OBJECTIVE: See Ophthalmology exam    ASSESSMENT:    ICD-10-CM    1. Insufficiency of tear film of both eyes H04.123 loteprednol (LOTEMAX) 0.5 % ophthalmic susp     multivitamin  with lutein (OCUVITE WITH LTEIN) CAPS per capsule   2. Allergic conjunctivitis, bilateral H10.13 loteprednol (LOTEMAX) 0.5 % ophthalmic susp      PLAN:     Patient Instructions   Lotemax- or FML- 1 drop both eyes 4 x day for 1 week.    Return in 1 week for recheck.  Discuss artificial tears and allergy drop at that visit.    Zion Zaldivar, OD             Again, thank you for allowing me to participate in the care of your patient.        Sincerely,        Zion Zaldivar, OD

## 2018-04-27 ENCOUNTER — TELEPHONE (OUTPATIENT)
Dept: FAMILY MEDICINE | Facility: CLINIC | Age: 58
End: 2018-04-27

## 2018-04-27 NOTE — TELEPHONE ENCOUNTER
Panel Management Review      BP Readings from Last 1 Encounters:   02/28/18 132/80    , No results found for: A1C, 2/28/2018  Last Office Visit with this department: 2/28/2018    Fail List measure: Colon cancer screening      Patient is due/failing the following:   COLONOSCOPY    Action needed:   Completed   Per Greer Lyons's office note 2/28/18: Colonoscopy 9/2/16 - polyp removed, pathology negative - next colonoscopy in about 8 years.    Type of outreach:    None    Questions for provider review:    None                                                                                                                                    Real Daniel      Chart routed to Abstract to input completion date into chart/procedure tab .

## 2019-02-05 ENCOUNTER — ANCILLARY PROCEDURE (OUTPATIENT)
Dept: GENERAL RADIOLOGY | Facility: CLINIC | Age: 59
End: 2019-02-05
Attending: NURSE PRACTITIONER
Payer: MEDICAID

## 2019-02-05 ENCOUNTER — OFFICE VISIT (OUTPATIENT)
Dept: URGENT CARE | Facility: URGENT CARE | Age: 59
End: 2019-02-05
Payer: MEDICAID

## 2019-02-05 VITALS
HEART RATE: 80 BPM | HEIGHT: 63 IN | OXYGEN SATURATION: 98 % | TEMPERATURE: 98 F | BODY MASS INDEX: 26.75 KG/M2 | SYSTOLIC BLOOD PRESSURE: 161 MMHG | WEIGHT: 151 LBS | DIASTOLIC BLOOD PRESSURE: 81 MMHG | RESPIRATION RATE: 12 BRPM

## 2019-02-05 DIAGNOSIS — M25.521 RIGHT ELBOW PAIN: Primary | ICD-10-CM

## 2019-02-05 DIAGNOSIS — S19.9XXA NECK INJURY, INITIAL ENCOUNTER: ICD-10-CM

## 2019-02-05 PROCEDURE — 70360 X-RAY EXAM OF NECK: CPT

## 2019-02-05 PROCEDURE — 99213 OFFICE O/P EST LOW 20 MIN: CPT | Performed by: NURSE PRACTITIONER

## 2019-02-05 PROCEDURE — 73080 X-RAY EXAM OF ELBOW: CPT | Mod: RT

## 2019-02-05 ASSESSMENT — ENCOUNTER SYMPTOMS
DIAPHORESIS: 0
DIARRHEA: 0
NAUSEA: 0
SHORTNESS OF BREATH: 0
RHINORRHEA: 0
NECK PAIN: 1
VOMITING: 0
CHILLS: 0
FEVER: 0
COUGH: 0
SORE THROAT: 0

## 2019-02-05 ASSESSMENT — PAIN SCALES - GENERAL: PAINLEVEL: EXTREME PAIN (8)

## 2019-02-05 ASSESSMENT — MIFFLIN-ST. JEOR: SCORE: 1393.71

## 2019-02-05 NOTE — PROGRESS NOTES
SUBJECTIVE:   Jose Gaines is a 58 year old male presenting with a chief complaint of   Chief Complaint   Patient presents with     Fall     fell Sunday night, falling to the right side hurting right arm/elbow, right knee and neck. been taking Ibuprofen     Elbow Pain     Neck Pain       He is an established patient of [facility].    MS Injury/Pain    Onset of symptoms was 2 day(s) ago.  Location: right elbow, neck:       The injury happened while while walking to the car from grocery store      Mechanism: fall on icy, slippery ground and fell hitting elbow on the ground and twisted neck      Patient experienced immediate pain, immediate swelling  Course of symptoms is worsening.    Severity moderate  Current and Associated symptoms: Pain and Swelling of right elbow, left neck pain and stiffness  Denies  Warmth  Aggravating Factors: movement, repetitive motion and flexion/extension  Therapies to improve symptoms include: none  This is the first time this type of problem has occurred for this patient.       Review of Systems   Constitutional: Negative for chills, diaphoresis and fever.   HENT: Negative for congestion, ear pain, rhinorrhea and sore throat.    Respiratory: Negative for cough and shortness of breath.    Gastrointestinal: Negative for diarrhea, nausea and vomiting.   Musculoskeletal: Positive for neck pain.        Right elbow pain   All other systems reviewed and are negative.      Past Medical History:   Diagnosis Date     Allergic rhinitis      ED (erectile dysfunction)      Family History   Problem Relation Age of Onset     Diabetes Father 50     Diabetes Brother         unknown age of onset     Current Outpatient Medications   Medication Sig Dispense Refill     hydrochlorothiazide (HYDRODIURIL) 25 MG tablet Take 1 tablet (25 mg) by mouth daily 90 tablet 3     multivitamin  with lutein (OCUVITE WITH LTEIN) CAPS per capsule Take 1 capsule by mouth daily 30 capsule 11     Social History     Tobacco Use  "    Smoking status: Never Smoker     Smokeless tobacco: Never Used   Substance Use Topics     Alcohol use: Yes       OBJECTIVE  /81 (BP Location: Left arm, Patient Position: Chair, Cuff Size: Adult Regular)   Pulse 80   Temp 98  F (36.7  C) (Oral)   Resp 12   Ht 1.59 m (5' 2.6\")   Wt 68.5 kg (151 lb)   SpO2 98%   BMI 27.09 kg/m      Physical Exam   Constitutional: No distress.   HENT:   Head: Normocephalic and atraumatic.   Right Ear: Tympanic membrane and external ear normal.   Left Ear: Tympanic membrane and external ear normal.   Mouth/Throat: Oropharynx is clear and moist.   Eyes: EOM are normal. Pupils are equal, round, and reactive to light.   Neck: Normal range of motion. Neck supple.   Pulmonary/Chest: Effort normal and breath sounds normal. No respiratory distress.   Musculoskeletal:   Cervical spine exam:   No focal tenderness is noted along spinous processes.  There is left sided tenderness of trapezius extending up side of neck   Range of Motion: forward flexion full , extension full , lateral rotation full , lateral flexion full.  Pain is increased with left lateral rotation and flexion  Right elbow swelling and tenderness, worse with movement   Lymphadenopathy:     He has no cervical adenopathy.   Neurological: He is alert. No cranial nerve deficit.   Skin: Skin is warm and dry. He is not diaphoretic.   Psychiatric: He has a normal mood and affect.   Nursing note and vitals reviewed.    ASSESSMENT:      ICD-10-CM    1. Right elbow pain M25.521 XR Elbow Right G/E 3 Views     CANCELED: XR Elbow Right 2 Views   2. Neck injury, initial encounter S19.9XXA XR Neck Soft Tissue        Medical Decision Making:    Differential Diagnosis:  MS Injury Pain: sprain, fracture, muscle strain and dislocation    Serious Comorbid Conditions:  Adult:  None    PLAN:  Rest painful area  ICE  Elevated  Pain medication as advised  Side effects of medication discussed  As pain subsides, stretching is " advised  Consider physical therapy if pain is persisting after symptomatic treatment  All questions answered and patient is in agreement with treatment paln

## 2019-02-05 NOTE — PATIENT INSTRUCTIONS
Patient Education     Upper Extremity Contusion  You have a contusion (bruise) of an upper extremity (arm, wrist, hand, or fingers). Symptoms include pain, swelling, and skin discoloration. No bones are broken. This injury may take from a few days to a few weeks to heal.  During that time, the bruise may change from reddish in color, to purple-blue, to green-yellow, to yellow-brown.  Home care    Unless another medicine was prescribed, you can take acetaminophen, ibuprofen, or naproxen to control pain. (If you have chronic liver or kidney disease or ever had a stomach ulcer or gastrointestinal bleeding, talk with your doctor before using these medicines.)    Elevate the injured area to reduce pain and swelling. As much as possible, sit or lie down with the injured area raised about the level of your heart. This is especially important during the first 48 hours.    Ice the injured area to help reduce pain and swelling. Wrap a cold source (ice pack or ice cubes in a plastic bag) in a thin towel. Apply to the bruised area for 20 minutes every 1 to 2 hours the first day. Continue this 3 to 4 times a day until the pain and swelling goes away.    If a sling was provided, you may remove it to shower or bathe. To prevent joint stiffness, do not wear it for more than 1 week.  Follow-up care  Follow up with your healthcare provide, or as advised. Call if you are not improving within the next 1 to 2 weeks.  When to seek medical advice   Call your healthcare provider right away if any of these occur:    Increased pain or swelling    Hand or fingers become cold, blue, numb or tingly    Signs of infection: Warmth, drainage, or increased redness or pain around the injury    Inability to move the injured body part     Frequent bruising for unknown reasons  Date Last Reviewed: 2/1/2017 2000-2018 [employer]. [address] [facility], [place] [address]. All rights reserved. This information is not intended as a  substitute for professional medical care. Always follow your healthcare professional's instructions.           Patient Education     Understanding Cervical Strain    There are 7 bones (vertebrae) in the neck that are part of the spine. These are called the cervical spine. Cervical strain is a medical term for neck pain. The neck has several layers of muscles. These are connected with tendons to the cervical spine and other bones. Neck pain is often the result of injury to these muscles and tendons.  Causes of cervical strain  Different types of stress on the neck can damage muscles and tendons (soft tissues) and cause cervical strain. Cervical tissues can be damaged by:    The neck being forced past its normal range of motion, such as in a car accident or sports injury    Constant, low-level stress, such as from poor posture or a poorly set-up workspace  Symptoms of cervical strain  These may include:    Neck pain or stiffness    Pain in the shoulders or upper back    Muscle spasms    Headache, often starting at the base of the neck    Irritability, difficulty concentrating, or sleeplessness  Treatment for cervical strain  This problem often gets better on its own. Treatments aim to reduce pain and inflammation and increase the range of motion of the neck. Possible treatments include:    Over-the-counter or prescription pain medicine. These help relieve pain and inflammation.    Stretching exercises to decrease neck stiffness.    Massage to decrease neck stiffness.    Cold or heat pack. These help reduce pain and swelling.  Call 911  Call 911 right away if you have any of these:    Face drooping or numbness    Numbness or weakness, especially in the arms or on one side    Slurred speech or difficulty speaking    Blurred vision   When to call your healthcare provider  Call your healthcare provider right away if you have any of these:    Fever of 100.4 F (38 C) or higher, or as directed    Pain or stiffness that gets  worse    Symptoms that don t get better, or get worse    Numbness, tingling, weakness or shooting pains into the arms or legs    New symptoms  Date Last Reviewed: 3/10/2016    [id number] [email or web address]. [address] [place] [address], [place] [address]. All rights reserved. This information is not intended as a substitute for professional medical care. Always follow your healthcare professional's instructions.

## 2019-03-04 ENCOUNTER — OFFICE VISIT (OUTPATIENT)
Dept: FAMILY MEDICINE | Facility: CLINIC | Age: 59
End: 2019-03-04
Payer: COMMERCIAL

## 2019-03-04 VITALS
BODY MASS INDEX: 27.79 KG/M2 | HEIGHT: 62 IN | WEIGHT: 151 LBS | TEMPERATURE: 97.6 F | SYSTOLIC BLOOD PRESSURE: 134 MMHG | OXYGEN SATURATION: 98 % | DIASTOLIC BLOOD PRESSURE: 84 MMHG | HEART RATE: 69 BPM | RESPIRATION RATE: 16 BRPM

## 2019-03-04 DIAGNOSIS — N48.9 PENILE LESION: ICD-10-CM

## 2019-03-04 DIAGNOSIS — I10 ESSENTIAL HYPERTENSION WITH GOAL BLOOD PRESSURE LESS THAN 140/90: ICD-10-CM

## 2019-03-04 DIAGNOSIS — Z00.00 ANNUAL PHYSICAL EXAM: Primary | ICD-10-CM

## 2019-03-04 DIAGNOSIS — Z11.3 SCREEN FOR STD (SEXUALLY TRANSMITTED DISEASE): ICD-10-CM

## 2019-03-04 DIAGNOSIS — Z11.4 SCREENING FOR HIV (HUMAN IMMUNODEFICIENCY VIRUS): ICD-10-CM

## 2019-03-04 DIAGNOSIS — E78.5 HYPERLIPIDEMIA LDL GOAL <100: ICD-10-CM

## 2019-03-04 DIAGNOSIS — B18.1 CHRONIC HEPATITIS B (H): ICD-10-CM

## 2019-03-04 LAB
ALBUMIN SERPL-MCNC: 3.7 G/DL (ref 3.4–5)
ALP SERPL-CCNC: 62 U/L (ref 40–150)
ALT SERPL W P-5'-P-CCNC: 46 U/L (ref 0–70)
ANION GAP SERPL CALCULATED.3IONS-SCNC: 6 MMOL/L (ref 3–14)
AST SERPL W P-5'-P-CCNC: 31 U/L (ref 0–45)
BASOPHILS # BLD AUTO: 0 10E9/L (ref 0–0.2)
BASOPHILS NFR BLD AUTO: 0.9 %
BILIRUB SERPL-MCNC: 0.5 MG/DL (ref 0.2–1.3)
BUN SERPL-MCNC: 14 MG/DL (ref 7–30)
CALCIUM SERPL-MCNC: 8.5 MG/DL (ref 8.5–10.1)
CHLORIDE SERPL-SCNC: 102 MMOL/L (ref 94–109)
CHOLEST SERPL-MCNC: 174 MG/DL
CO2 SERPL-SCNC: 29 MMOL/L (ref 20–32)
CREAT SERPL-MCNC: 0.76 MG/DL (ref 0.66–1.25)
DIFFERENTIAL METHOD BLD: NORMAL
EOSINOPHIL # BLD AUTO: 0.3 10E9/L (ref 0–0.7)
EOSINOPHIL NFR BLD AUTO: 6.3 %
ERYTHROCYTE [DISTWIDTH] IN BLOOD BY AUTOMATED COUNT: 14.4 % (ref 10–15)
GFR SERPL CREATININE-BSD FRML MDRD: >90 ML/MIN/{1.73_M2}
GLUCOSE SERPL-MCNC: 114 MG/DL (ref 70–99)
HCT VFR BLD AUTO: 45 % (ref 40–53)
HDLC SERPL-MCNC: 66 MG/DL
HGB BLD-MCNC: 15.4 G/DL (ref 13.3–17.7)
HIV 1+2 AB+HIV1 P24 AG SERPL QL IA: NONREACTIVE
HSV1 IGG SERPL QL IA: >8 AI (ref 0–0.8)
HSV2 IGG SERPL QL IA: 6.9 AI (ref 0–0.8)
LDLC SERPL CALC-MCNC: 89 MG/DL
LYMPHOCYTES # BLD AUTO: 1.5 10E9/L (ref 0.8–5.3)
LYMPHOCYTES NFR BLD AUTO: 32.5 %
MCH RBC QN AUTO: 28.4 PG (ref 26.5–33)
MCHC RBC AUTO-ENTMCNC: 34.2 G/DL (ref 31.5–36.5)
MCV RBC AUTO: 83 FL (ref 78–100)
MONOCYTES # BLD AUTO: 0.4 10E9/L (ref 0–1.3)
MONOCYTES NFR BLD AUTO: 9.2 %
NEUTROPHILS # BLD AUTO: 2.3 10E9/L (ref 1.6–8.3)
NEUTROPHILS NFR BLD AUTO: 51.1 %
NONHDLC SERPL-MCNC: 108 MG/DL
PLATELET # BLD AUTO: 215 10E9/L (ref 150–450)
POTASSIUM SERPL-SCNC: 3.3 MMOL/L (ref 3.4–5.3)
PROT SERPL-MCNC: 7.8 G/DL (ref 6.8–8.8)
PSA SERPL-ACNC: 0.67 UG/L (ref 0–4)
RBC # BLD AUTO: 5.43 10E12/L (ref 4.4–5.9)
SODIUM SERPL-SCNC: 137 MMOL/L (ref 133–144)
TRIGL SERPL-MCNC: 97 MG/DL
WBC # BLD AUTO: 4.5 10E9/L (ref 4–11)

## 2019-03-04 PROCEDURE — 86695 HERPES SIMPLEX TYPE 1 TEST: CPT | Performed by: INTERNAL MEDICINE

## 2019-03-04 PROCEDURE — 86696 HERPES SIMPLEX TYPE 2 TEST: CPT | Performed by: INTERNAL MEDICINE

## 2019-03-04 PROCEDURE — 87517 HEPATITIS B DNA QUANT: CPT | Performed by: INTERNAL MEDICINE

## 2019-03-04 PROCEDURE — 80053 COMPREHEN METABOLIC PANEL: CPT | Performed by: INTERNAL MEDICINE

## 2019-03-04 PROCEDURE — 87389 HIV-1 AG W/HIV-1&-2 AB AG IA: CPT | Performed by: INTERNAL MEDICINE

## 2019-03-04 PROCEDURE — 36415 COLL VENOUS BLD VENIPUNCTURE: CPT | Performed by: INTERNAL MEDICINE

## 2019-03-04 PROCEDURE — 86694 HERPES SIMPLEX NES ANTBDY: CPT | Performed by: INTERNAL MEDICINE

## 2019-03-04 PROCEDURE — 99396 PREV VISIT EST AGE 40-64: CPT | Performed by: INTERNAL MEDICINE

## 2019-03-04 PROCEDURE — G0103 PSA SCREENING: HCPCS | Performed by: INTERNAL MEDICINE

## 2019-03-04 PROCEDURE — 85025 COMPLETE CBC W/AUTO DIFF WBC: CPT | Performed by: INTERNAL MEDICINE

## 2019-03-04 PROCEDURE — 80061 LIPID PANEL: CPT | Performed by: INTERNAL MEDICINE

## 2019-03-04 RX ORDER — ASPIRIN 81 MG/1
81 TABLET ORAL DAILY
COMMUNITY

## 2019-03-04 RX ORDER — TRIAMTERENE AND HYDROCHLOROTHIAZIDE 37.5; 25 MG/1; MG/1
1 CAPSULE ORAL EVERY MORNING
Qty: 90 CAPSULE | Refills: 3 | Status: SHIPPED | OUTPATIENT
Start: 2019-03-04 | End: 2020-06-18

## 2019-03-04 RX ORDER — SIMVASTATIN 20 MG
TABLET ORAL
Refills: 2 | COMMUNITY
Start: 2019-02-14 | End: 2019-03-04

## 2019-03-04 RX ORDER — SIMVASTATIN 20 MG
20 TABLET ORAL AT BEDTIME
Qty: 90 TABLET | Refills: 3 | Status: SHIPPED | OUTPATIENT
Start: 2019-03-04 | End: 2020-03-05

## 2019-03-04 ASSESSMENT — MIFFLIN-ST. JEOR: SCORE: 1384.18

## 2019-03-04 ASSESSMENT — PAIN SCALES - GENERAL: PAINLEVEL: NO PAIN (0)

## 2019-03-04 NOTE — PROGRESS NOTES
SUBJECTIVE:   CC: Jose Gaines is an 58 year old male who presents for preventive health visit.     Healthy Habits:    Do you get at least three servings of calcium containing foods daily (dairy, green leafy vegetables, etc.)? no, taking calcium and/or vitamin D supplement: yes - multivitamin    Amount of exercise or daily activities, outside of work: 2 day(s) per week    Problems taking medications regularly No    Medication side effects: No    Have you had an eye exam in the past two years? yes    Do you see a dentist twice per year? yes    Do you have sleep apnea, excessive snoring or daytime drowsiness?no      CHRONIC CONDITIONS     1) Hyperlipidemia Follow-Up      Rate your low fat/cholesterol diet?: not monitoring fat    Taking statin?  Yes, no muscle aches from statin    Other lipid medications/supplements?:  none    2) Hypertension Follow-up      Outpatient blood pressures are not being checked.    Low Salt Diet: not monitoring salt        Today's PHQ-2 Score:   PHQ-2 ( 1999 Pfizer) 3/4/2019   Q1: Little interest or pleasure in doing things 0   Q2: Feeling down, depressed or hopeless 0   PHQ-2 Score 0       Abuse: Current or Past(Physical, Sexual or Emotional)- No  Do you feel safe in your environment? Yes    Social History     Tobacco Use     Smoking status: Never Smoker     Smokeless tobacco: Never Used   Substance Use Topics     Alcohol use: Yes     If you drink alcohol do you typically have >3 drinks per day or >7 drinks per week? No                      Last PSA: No results found for: PSA    Reviewed orders with patient. Reviewed health maintenance and updated orders accordingly - Yes  Labs reviewed in EPIC  BP Readings from Last 3 Encounters:   03/04/19 134/84   02/05/19 161/81   10/25/18 128/85    Wt Readings from Last 3 Encounters:   03/04/19 68.5 kg (151 lb)   02/05/19 68.5 kg (151 lb)   02/28/18 66.7 kg (147 lb)                  Patient Active Problem List   Diagnosis     Seasonal allergic  rhinitis     CARDIOVASCULAR SCREENING; LDL GOAL LESS THAN 130     Chronic hepatitis B (H)     Essential hypertension     Hyperlipidemia     Internal hemorrhoid     Language barrier     Oral ulcer     Overweight     Past Surgical History:   Procedure Laterality Date     SURGICAL HISTORY OF -   1/31/05 & 8/05    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       Social History     Tobacco Use     Smoking status: Never Smoker     Smokeless tobacco: Never Used   Substance Use Topics     Alcohol use: Yes     Family History   Problem Relation Age of Onset     Diabetes Father 50     Diabetes Brother         unknown age of onset         No Known Allergies  Recent Labs   Lab Test 02/28/18  1148   *   HDL 93   TRIG 74   CR 0.76   GFRESTIMATED >90   GFRESTBLACK >90   POTASSIUM 3.7        Reviewed and updated as needed this visit by clinical staff  Tobacco  Allergies  Meds  Med Hx  Surg Hx  Fam Hx  Soc Hx        Reviewed and updated as needed this visit by Provider            ROS:  CONSTITUTIONAL: NEGATIVE for fever, chills, change in weight  INTEGUMENTARY/SKIN: POSITIVE for papular lesion on penile shaft  that was first noted 2 weeks ago, not associated with dysuria nor penile discharge.  EYES: NEGATIVE for vision changes or irritation  ENT: NEGATIVE for ear, mouth and throat problems  RESP: NEGATIVE for significant cough or SOB  CV: NEGATIVE for chest pain, palpitations or peripheral edema  GI: NEGATIVE for nausea, abdominal pain, heartburn, or change in bowel habits   male: POSITIVE for chronic Hepatitis B, with previous Hepa B DNA of 161 last 10/2018, negative Hepa Be antigen and normal LFTs.  MUSCULOSKELETAL: NEGATIVE for significant arthralgias or myalgia  NEURO: NEGATIVE for weakness, dizziness or paresthesias  PSYCHIATRIC: NEGATIVE for changes in mood or affect    OBJECTIVE:   /84 (BP Location: Left arm, Patient Position: Chair, Cuff Size: Adult Regular)   Pulse 69   Temp 97.6  F (36.4  C) (Oral)   Resp 16   Ht  "1.575 m (5' 2\")   Wt 68.5 kg (151 lb)   SpO2 98%   BMI 27.62 kg/m    EXAM:  GENERAL: healthy, alert and no distress  EYES: Eyes grossly normal to inspection, PERRL and conjunctivae and sclerae normal  HENT: ear canals and TM's normal, nose and mouth without ulcers or lesions  NECK: no adenopathy, no asymmetry, masses, or scars and thyroid normal to palpation  RESP: lungs clear to auscultation - no rales, rhonchi or wheezes  CV: regular rate and rhythm, normal S1 S2, no S3 or S4, no murmur, click or rub, no peripheral edema and peripheral pulses strong  ABDOMEN: soft, nontender, no hepatosplenomegaly, no masses and bowel sounds normal   (male): normal male genitalia without lesions or urethral discharge, no hernia  MS: no gross musculoskeletal defects noted, no edema  SKIN: Multiple papular lesions of penile shaft.  NEURO: Normal strength and tone, mentation intact and speech normal  PSYCH: mentation appears normal, affect normal/bright    Diagnostic tests    Colonoscopy9/2/2016  [facility]   Result Narrative   [facility] [place]  Patient Name: Jose Gaines  Procedure Date: 9/2/2016 9:44 AM  MRN: [id number]  Account Number: [id number]  YOB: 1960  Note Status: Finalized  Attending MD: LORNE WILLIAMSON MD  Procedure:       Colonoscopy  Indications:       Screening for colorectal malignant neoplasm  Providers:       LORNE WILLIAMSON MD, Mirtha Grissom RN, LORNE WILLIAMSON MD  Referring Provider:       TYE MANJARREZ MD  Requesting Provider:  Complications:       No immediate complications.  Procedure:       Pre-Anesthesia Assessment:       - Prior to the procedure, a History and Physical was performed, and        patient medications and allergies were reviewed. The patient is        competent. The risks and benefits of the procedure and the sedation        options and risks were discussed with the patient. All questions were        answered and informed consent was obtained. Patient " identification and        proposed procedure were verified by the physician and the nurse in the        pre-procedure area. Mental Status Examination: alert and oriented.        Airway Examination: normal oropharyngeal airway and neck mobility.        Respiratory Examination: clear to auscultation. CV Examination: normal.        Prophylactic Antibiotics: The patient does not require prophylactic        antibiotics. Prior Anticoagulants: The patient has taken no previous        anticoagulant or antiplatelet agents. ASA Grade Assessment: I - A        normal, healthy patient. After reviewing the risks and benefits, the        patient was deemed in satisfactory condition to undergo the procedure.        The anesthesia plan was to use moderate sedation / analgesia (conscious        sedation). Immediately prior to administration of medications, the        patient was re-assessed for adequacy to receive sedatives. The heart        rate, respiratory rate, oxygen saturations, blood pressure, adequacy of        pulmonary ventilation, and response to care were monitored throughout        the procedure. The physical status of the patient was re-assessed after        the procedure.       Informed consent was obtained after discussion of the procedure        including its risks of perforation, bleeding, potential need for        surgery, potential complications associated with conscious sedation, and        possibility of an incomplete procedure or missed lesion. The scope was        passed under direct vision. Throughout the procedure, the patient's        blood pressure, pulse, and oxygen saturations were monitored        continuously. The Colonoscope was introduced through the anus and        advanced to the cecum, identified by the ileocecal valve. The        colonoscopy was performed without difficulty. The quality of the bowel        preparation was excellent.  Findings:       The perianal and digital rectal examinations  were normal. Pertinent        negatives include normal sphincter tone and no palpable rectal lesions.       A 3 mm polyp was found in the rectum. The polyp was semi-sessile. The        polyp was removed with a jumbo cold forceps. Resection was complete, and        retrieval was complete.  Impression:       - One 3 mm polyp in the rectum. Resected and retrieved.       - Excellent bowel prep.  Recommendation:       - Telephone endoscopist for pathology results in 1 week.       - Repeat colonoscopy in 10 years for surveillance based on pathology        results.  Procedure Code(s):       --- Professional ---       39431, Colonoscopy, flexible; with biopsy, single or multiple  Diagnosis Code(s):       --- Professional ---       Z12.11, Encounter for screening for malignant neoplasm of colon       K62.1, Rectal polyp  CPT copyright 2014 American Medical Association. All rights reserved.  The codes documented in this report are preliminary and upon  review may   be revised to meet current compliance requirements.  LORNE WILLIAMSON MD  9/2/2016 10:22:55 AM  This report has been signed electronically.LORNE WILLIAMSON MD  Number of Addenda: 0  Note Initiated On: 9/2/2016 9:44 AM       [address] [place] [address]   Other Result Information   Interface, Nmhcradordrslt In - 09/02/2016 10:23 AM CDT  [facility]  Patient Name: Jose Gaines  Procedure Date: 9/2/2016 9:44 AM  MRN: [id number]  Account Number: [id number]  YOB: 1960  Note Status: Finalized  Attending MD: LORNE WILLIAMSON MD  Procedure:       Colonoscopy  Indications:       Screening for colorectal malignant neoplasm  Providers:       LORNE WILLIAMSON MD, Mirtha Grissom RN, LORNE WILLIAMSON MD  Referring Provider:       TYE MANJARREZ MD  Requesting Provider:  Complications:       No immediate complications.  Procedure:       Pre-Anesthesia Assessment:       - Prior to the procedure, a History and Physical was performed, and         patient medications and allergies were reviewed. The patient is        competent. The risks and benefits of the procedure and the sedation        options and risks were discussed with the patient. All questions were        answered and informed consent was obtained. Patient identification and        proposed procedure were verified by the physician and the nurse in the        pre-procedure area. Mental Status Examination: alert and oriented.        Airway Examination: normal oropharyngeal airway and neck mobility.        Respiratory Examination: clear to auscultation. CV Examination: normal.        Prophylactic Antibiotics: The patient does not require prophylactic        antibiotics. Prior Anticoagulants: The patient has taken no previous        anticoagulant or antiplatelet agents. ASA Grade Assessment: I - A        normal, healthy patient. After reviewing the risks and benefits, the        patient was deemed in satisfactory condition to undergo the procedure.        The anesthesia plan was to use moderate sedation / analgesia (conscious        sedation). Immediately prior to administration of medications, the        patient was re-assessed for adequacy to receive sedatives. The heart        rate, respiratory rate, oxygen saturations, blood pressure, adequacy of        pulmonary ventilation, and response to care were monitored throughout        the procedure. The physical status of the patient was re-assessed after        the procedure.       Informed consent was obtained after discussion of the procedure        including its risks of perforation, bleeding, potential need for        surgery, potential complications associated with conscious sedation, and        possibility of an incomplete procedure or missed lesion. The scope was        passed under direct vision. Throughout the procedure, the patient's        blood pressure, pulse, and oxygen saturations were monitored        continuously. The Colonoscope was  introduced through the anus and        advanced to the cecum, identified by the ileocecal valve. The        colonoscopy was performed without difficulty. The quality of the bowel        preparation was excellent.  Findings:       The perianal and digital rectal examinations were normal. Pertinent        negatives include normal sphincter tone and no palpable rectal lesions.       A 3 mm polyp was found in the rectum. The polyp was semi-sessile. The        polyp was removed with a jumbo cold forceps. Resection was complete, and        retrieval was complete.  Impression:       - One 3 mm polyp in the rectum. Resected and retrieved.       - Excellent bowel prep.  Recommendation:       - Telephone endoscopist for pathology results in 1 week.       - Repeat colonoscopy in 10 years for surveillance based on pathology        results.  Procedure Code(s):       --- Professional ---       94229, Colonoscopy, flexible; with biopsy, single or multiple  Diagnosis Code(s):       --- Professional ---       Z12.11, Encounter for screening for malignant neoplasm of colon       K62.1, Rectal polyp  CPT copyright 2014 American Medical Association. All rights reserved.  The codes documented in this report are preliminary and upon  review may   be revised to meet current compliance requirements.  LORNE WILLIAMSON MD  9/2/2016 10:22:55 AM  This report has been signed electronically.LORNE WILLIAMSON MD  Number of Addenda: 0  Note Initiated On: 9/2/2016 9:44 AM       [address] [place] [address]       Diagnostic Test Results:  Pending.    ASSESSMENT/PLAN:   1. Annual physical exam  Comment: No family history of premature atherosclerotic heart disease or early-onset colon cancer.  - CBC with platelets and differential  - Comprehensive metabolic panel (BMP + Alb, Alk Phos, ALT, AST, Total. Bili, TP)    2. Essential hypertension with goal blood pressure less than 140/90  Comment: Well-controlled with hydrochlorothiazide. But  "switch to Dyazide to avoid electrolyte abnormalities and due to low normal potassium level last 10/2018.  - triamterene-HCTZ (DYAZIDE) 37.5-25 MG capsule; Take 1 capsule by mouth every morning  Dispense: 90 capsule; Refill: 3    3. Hyperlipidemia LDL goal <100  Comment: Well-controlled with Simvastatin. No abnormal LFTs despite chronic Hepatitis B.  - simvastatin (ZOCOR) 20 MG tablet; Take 1 tablet (20 mg) by mouth At Bedtime  Dispense: 90 tablet; Refill: 3  - Lipid panel reflex to direct LDL Non-fasting    4. Screening for HIV (human immunodeficiency virus)  Comment: Patient is requesting screening test.  - HIV Antigen Antibody Combo    5. Chronic hepatitis B (H)  Comment: Previous FibroTest score shows moderate fibrosis (may be related to associated fatty liver). Hep B DNA last 10/2018 is only 161, associated with normal LFTs.  - Hep B Virus DNA Quant Real Time PCR    6. Screen for STD (sexually transmitted disease)  Comment: Due to presence of penile lesions, patient is requesting STD screen.  - Herpes Simplex Virus 1 and 2 IgG  - Herpes: HSV IgM antibody          COUNSELING:  Special attention given to:        Regular exercise       Healthy diet/nutrition       Safe sex practices/STD prevention       Prostate cancer screening       The 10-year ASCVD risk score (Garner DIMA Jr., et al., 2013) is: 5.5%    Values used to calculate the score:      Age: 58 years      Sex: Male      Is Non- : No      Diabetic: No      Tobacco smoker: No      Systolic Blood Pressure: 134 mmHg      Is BP treated: No      HDL Cholesterol: 93 mg/dL      Total Cholesterol: 226 mg/dL    BP Readings from Last 1 Encounters:   03/04/19 (!) 163/91     Estimated body mass index is 27.62 kg/m  as calculated from the following:    Height as of this encounter: 1.575 m (5' 2\").    Weight as of this encounter: 68.5 kg (151 lb).      Weight management plan: diet and exercise     reports that  has never smoked. he has never used " smokeless tobacco.      Counseling Resources:  ATP IV Guidelines  Pooled Cohorts Equation Calculator  FRAX Risk Assessment  ICSI Preventive Guidelines  Dietary Guidelines for Americans, 2010  USDA's MyPlate  ASA Prophylaxis  Lung CA Screening    Ky Stone MD  [facility]

## 2019-03-04 NOTE — PATIENT INSTRUCTIONS
At [facility], we strive to deliver an exceptional experience to you, every time we see you.  If you receive a survey in the mail, please send us back your thoughts. We really do value your feedback.    Based on your medical history, these are the current health maintenance/preventive care services that you are due for (some may have been done at this visit.)  Health Maintenance Due   Topic Date Due     PHQ-2 Q1 YR  06/06/1972     HIV SCREEN (SYSTEM ASSIGNED)  06/06/1978     HEPATITIS C SCREENING  06/06/1978     ZOSTER IMMUNIZATION (1 of 2) 06/06/2010     ADVANCE DIRECTIVE PLANNING Q5 YRS  06/06/2015     INFLUENZA VACCINE (1) 09/01/2018     PREVENTIVE CARE VISIT  02/28/2019         Suggested websites for health information:  Www.[employer] : Up to date and easily searchable information on multiple topics.  Www.[employer].gov : medication info, interactive tutorials, watch real surgeries online  Www.familydoctor.org : good info from the Academy of Family Physicians  Www.cdc.gov : public health info, travel advisories, epidemics (H1N1)  Www.aap.org : children's health info, normal development, vaccinations  Www.health.[employer].[place].us : [place] dept of health, public health issues in [place], N1N1    Your care team:                            Family Medicine Internal Medicine   MD Ky Crawley MD Shantel Branch-Fleming, MD Katya Georgiev PA-C Nam Ho, MD Pediatrics   CURTIS Davis, MD Ronda Tarango CNP, MD Deborah Mielke, MD Kim Thein, APRN CNP      Clinic hours: Monday - Thursday 7 am-7 pm; Fridays 7 am-5 pm.   Urgent care: Monday - Friday 11 am-9 pm; Saturday and Sunday 9 am-5 pm.  Pharmacy : Monday -Thursday 8 am-8 pm; Friday 8 am-6 pm; Saturday and Sunday 9 am-5 pm.     Clinic: [phone]   Pharmacy: [phone]    Preventive Health Recommendations  Male Ages 50 - 64    Yearly exam:             See your  health care provider every year in order to  o   Review health changes.   o   Discuss preventive care.    o   Review your medicines if your doctor has prescribed any.     Have a cholesterol test every 5 years, or more frequently if you are at risk for high cholesterol/heart disease.     Have a diabetes test (fasting glucose) every three years. If you are at risk for diabetes, you should have this test more often.     Have a colonoscopy at age 50, or have a yearly FIT test (stool test). These exams will check for colon cancer.      Talk with your health care provider about whether or not a prostate cancer screening test (PSA) is right for you.    You should be tested each year for STDs (sexually transmitted diseases), if you re at risk.     Shots: Get a flu shot each year. Get a tetanus shot every 10 years.     Nutrition:    Eat at least 5 servings of fruits and vegetables daily.     Eat whole-grain bread, whole-wheat pasta and brown rice instead of white grains and rice.     Get adequate Calcium and Vitamin D.     Lifestyle    Exercise for at least 150 minutes a week (30 minutes a day, 5 days a week). This will help you control your weight and prevent disease.     Limit alcohol to one drink per day.     No smoking.     Wear sunscreen to prevent skin cancer.     See your dentist every six months for an exam and cleaning.     See your eye doctor every 1 to 2 years.

## 2019-03-05 LAB — HSV 1+2 IGM SER-IMP: 0.9 INDEX VALUE (ref 0–0.89)

## 2019-03-06 LAB
HBV DNA SERPL NAA+PROBE-ACNC: 9776 [IU]/ML
HBV DNA SERPL NAA+PROBE-LOG IU: 4 {LOG_IU}/ML

## 2019-03-13 ENCOUNTER — TELEPHONE (OUTPATIENT)
Dept: FAMILY MEDICINE | Facility: CLINIC | Age: 59
End: 2019-03-13

## 2019-03-13 DIAGNOSIS — B18.1 VIRAL HEPATITIS B CHRONIC (H): ICD-10-CM

## 2019-03-13 DIAGNOSIS — R76.8 HSV-2 SEROPOSITIVE: Primary | ICD-10-CM

## 2019-03-13 NOTE — TELEPHONE ENCOUNTER
Reason for Call:  Request for results:    Name of test or procedure: blood and urine    Date of test of procedure: 3/4/19    Location of the test or procedure: [facility]    OK to leave the result message on voice mail or with a family member? YES    Phone number Patient can be reached at:  Home number on file [phone] (home)    Additional comments: Wants results mailed out to his home.    Call taken on 3/13/2019 at 2:46 PM by Margarita Seo

## 2019-03-15 RX ORDER — VALACYCLOVIR HYDROCHLORIDE 1 G/1
1000 TABLET, FILM COATED ORAL 2 TIMES DAILY
Qty: 20 TABLET | Refills: 3 | Status: SHIPPED | OUTPATIENT
Start: 2019-03-15 | End: 2019-09-26

## 2019-03-15 RX ORDER — VALACYCLOVIR HYDROCHLORIDE 500 MG/1
500 TABLET, FILM COATED ORAL DAILY
Qty: 90 TABLET | Refills: 3 | Status: SHIPPED | OUTPATIENT
Start: 2019-03-15 | End: 2019-09-26

## 2019-03-15 NOTE — TELEPHONE ENCOUNTER
"Please refer to result note below:     \"Your laboratory test shows the followin) Positive test for HSV 2 (genital herpes). Positive IgM indicates current infection with genital herpes, therefore I ecommend treatment with Valtrex 1000 gm BID x 10 days.. Due to positive IgG, it also indicates previous infection. Also recommend Valtrex 500 mg once daily for suppressive treatment AFTER finishing Valtrex 1 gm BID x 10 days..Positive HSV 2 IgG simply indicates previous exposure to cold sores (not STD).  2) Negative test for HIV.  3) Serum PSA is normal, indicating low risk of prostate cancer.  4) Low potassium. This is the reason why hydrochlorothiazide was discontinued during visit and switch to Dyazide.   5) Prediabetes or \"borderline diabetes\" based on glucose level.  6) Normal electrolytes.  7) Normal kidney function tests.  8) Normal liver function tests despite chronic hepatitis B.  9) Hepatitis B DNA is abnormal at 9776, which is expected due to chronic hepatitis B. Due to normal ALT (liver function test), no reason to be treated. Requires close monitoring of ALT every 3 months (standing orders entered). Next ALT due in 3 months and should be followed by a clinic appointment.  10) Lipid panel is mildly abnormal, but calculated 10 year risk of heart disease/stroke is only 5%, therefore, no reason to take statins.\"    "

## 2019-03-15 NOTE — TELEPHONE ENCOUNTER
"Clinic Action Needed:No  Reason for Call: Patient calling clinic back in regards to his results.  Patient was notified of his results, and is aware that a prescription was sent to his pharmacy.  Patient reported that he had some other questions, but would call back when his son was available as his son \"understands better.\"    Payal Stevens RN  [facility]      "

## 2019-03-15 NOTE — TELEPHONE ENCOUNTER
This writer attempted to contact Jose on 03/15/19      Reason for call results and left message.      If patient calls back:   Registered Nurse called. Follow Triage Call workflow        Nevaeh Du RN

## 2019-03-18 ENCOUNTER — NURSE TRIAGE (OUTPATIENT)
Dept: NURSING | Facility: CLINIC | Age: 59
End: 2019-03-18

## 2019-03-19 ENCOUNTER — OFFICE VISIT (OUTPATIENT)
Dept: FAMILY MEDICINE | Facility: CLINIC | Age: 59
End: 2019-03-19
Payer: COMMERCIAL

## 2019-03-19 VITALS
OXYGEN SATURATION: 96 % | BODY MASS INDEX: 27.05 KG/M2 | DIASTOLIC BLOOD PRESSURE: 87 MMHG | WEIGHT: 147 LBS | HEIGHT: 62 IN | TEMPERATURE: 98.8 F | SYSTOLIC BLOOD PRESSURE: 140 MMHG | HEART RATE: 69 BPM

## 2019-03-19 DIAGNOSIS — R19.7 DIARRHEA OF PRESUMED INFECTIOUS ORIGIN: Primary | ICD-10-CM

## 2019-03-19 DIAGNOSIS — I10 HYPERTENSION GOAL BP (BLOOD PRESSURE) < 130/80: ICD-10-CM

## 2019-03-19 DIAGNOSIS — B18.1 VIRAL HEPATITIS B CHRONIC (H): ICD-10-CM

## 2019-03-19 PROCEDURE — 99214 OFFICE O/P EST MOD 30 MIN: CPT | Performed by: INTERNAL MEDICINE

## 2019-03-19 RX ORDER — METRONIDAZOLE 500 MG/1
500 TABLET ORAL 3 TIMES DAILY
Qty: 10 TABLET | Refills: 0 | Status: SHIPPED | OUTPATIENT
Start: 2019-03-19 | End: 2019-03-21

## 2019-03-19 ASSESSMENT — MIFFLIN-ST. JEOR: SCORE: 1366.04

## 2019-03-19 ASSESSMENT — PAIN SCALES - GENERAL: PAINLEVEL: NO PAIN (0)

## 2019-03-20 DIAGNOSIS — R19.7 DIARRHEA OF PRESUMED INFECTIOUS ORIGIN: ICD-10-CM

## 2019-03-20 LAB
C COLI+JEJUNI+LARI FUSA STL QL NAA+PROBE: NOT DETECTED
C DIFF TOX B STL QL: NEGATIVE
EC STX1 GENE STL QL NAA+PROBE: NOT DETECTED
EC STX2 GENE STL QL NAA+PROBE: NOT DETECTED
ENTERIC PATHOGEN COMMENT: NORMAL
NOROV GI+II ORF1-ORF2 JNC STL QL NAA+PR: NOT DETECTED
RVA NSP5 STL QL NAA+PROBE: NOT DETECTED
SALMONELLA SP RPOD STL QL NAA+PROBE: NOT DETECTED
SHIGELLA SP+EIEC IPAH STL QL NAA+PROBE: NOT DETECTED
SPECIMEN SOURCE: NORMAL
V CHOL+PARA RFBL+TRKH+TNAA STL QL NAA+PR: NOT DETECTED
Y ENTERO RECN STL QL NAA+PROBE: NOT DETECTED

## 2019-03-20 PROCEDURE — 83993 ASSAY FOR CALPROTECTIN FECAL: CPT | Performed by: INTERNAL MEDICINE

## 2019-03-20 PROCEDURE — 87493 C DIFF AMPLIFIED PROBE: CPT | Mod: 59 | Performed by: INTERNAL MEDICINE

## 2019-03-20 PROCEDURE — 87506 IADNA-DNA/RNA PROBE TQ 6-11: CPT | Performed by: INTERNAL MEDICINE

## 2019-03-20 NOTE — PROGRESS NOTES
SUBJECTIVE:   Jose Gaines is a 58 year old male who presents to clinic today for the following health issues:      Diarrhea  Onset: over 1 week    Description:   Consistency of stool: watery and runny  Blood in stool: no   Number of loose stools in past 24 hours: 4    Progression of Symptoms:  worsening    Accompanying Signs & Symptoms:  Fever: no   Nausea or vomiting; no   Abdominal pain: YES  Episodes of constipation: no   Weight loss: no     History:   Ill contacts: no   Recent use of antibiotics: no    Recent travels: no          Recent medication-new or changes(Rx or OTC): no     Precipitating factors:   none    Alleviating factors:   none  Therapies Tried and outcome:  Imodium AD and PeptoBismol;      Problem list and histories reviewed & adjusted, as indicated.  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       Social History     Tobacco Use     Smoking status: Never Smoker     Smokeless tobacco: Never Used   Substance Use Topics     Alcohol use: Yes     Family History   Problem Relation Age of Onset     Diabetes Father 50     Diabetes Brother         unknown age of onset         No Known Allergies  Recent Labs   Lab Test 03/04/19  0742 02/28/18  1148   LDL 89 118*   HDL 66 93   TRIG 97 74   ALT 46  --    CR 0.76 0.76   GFRESTIMATED >90 >90   GFRESTBLACK >90 >90   POTASSIUM 3.3* 3.7      BP Readings from Last 3 Encounters:   03/19/19 140/87   03/04/19 134/84   02/05/19 161/81    Wt Readings from Last 3 Encounters:   03/19/19 66.7 kg (147 lb)   03/04/19 68.5 kg (151 lb)   02/05/19 68.5 kg (151 lb)                  Labs  "reviewed in EPIC    Reviewed and updated as needed this visit by clinical staff  Tobacco  Allergies  Meds  Problems  Med Hx  Surg Hx  Fam Hx  Soc Hx        Reviewed and updated as needed this visit by Provider         ROS:  CONSTITUTIONAL: NEGATIVE for fever, chills, change in weight  INTEGUMENTARY/SKIN: NEGATIVE for worrisome rashes, moles or lesions  EYES: NEGATIVE for vision changes or irritation  ENT/MOUTH: NEGATIVE for ear, mouth and throat problems  RESP: NEGATIVE for significant cough or SOB  CV: NEGATIVE for chest pain, palpitations or peripheral edema  GI: NEGATIVE for nausea, abdominal pain, heartburn, or change in bowel habits  : NEGATIVE for frequency, dysuria, or hematuria  MUSCULOSKELETAL: NEGATIVE for significant arthralgias or myalgia  NEURO: NEGATIVE for weakness, dizziness or paresthesias  ENDOCRINE: NEGATIVE for temperature intolerance, skin/hair changes  HEME: NEGATIVE for bleeding problems  PSYCHIATRIC: NEGATIVE for changes in mood or affect    OBJECTIVE:     /87   Pulse 69   Temp 98.8  F (37.1  C) (Oral)   Ht 1.575 m (5' 2\")   Wt 66.7 kg (147 lb)   SpO2 96%   BMI 26.89 kg/m    Body mass index is 26.89 kg/m .  GENERAL: healthy, alert and no distress  EYES: Eyes grossly normal to inspection, PERRL and conjunctivae and sclerae normal  HENT: ear canals and TM's normal, nose and mouth without ulcers or lesions  NECK: no adenopathy, no asymmetry, masses, or scars and thyroid normal to palpation  RESP: lungs clear to auscultation - no rales, rhonchi or wheezes  CV: regular rate and rhythm, normal S1 S2, no S3 or S4, no murmur, click or rub, no peripheral edema and peripheral pulses strong  ABDOMEN: soft, nontender, no hepatosplenomegaly, no masses and bowel sounds normal  MS: no gross musculoskeletal defects noted, no edema  SKIN: no suspicious lesions or rashes  NEURO: Normal strength and tone, mentation intact and speech normal  PSYCH: mentation appears normal, affect " normal/bright    Diagnostic Test Results:  Pending.    ASSESSMENT/PLAN:     1. Diarrhea of presumed infectious origin  Comment: Treat empirically with Flagyl and Cholestyramine. If all tests are normal, then consider colonoscopy (previous test last 2019 only shows hyperplastic polyp).  - Enteric Bacteria and Virus Panel by [facility] Stool; Future  - Clostridium difficile Toxin B PCR; Future  - Calprotectin Feces; Future    2. Viral hepatitis B chronic (H)  Comment: Due for GI appointment. Liver panel remains normal.    3. Hypertension goal BP (blood pressure) < 130/80  Comment: Not optimally controlled with Dyazide alone. Consider adding ACE INHIBITORs.  - Basic metabolic panel  (Ca, Cl, CO2, Creat, Gluc, K, Na, BUN); Future    Follow-up visit if condition worsens.    Ky Stone MD  [facility]

## 2019-03-20 NOTE — PATIENT INSTRUCTIONS
================================================================================  Normal Values   Blood pressure  <140/90 for most adults    <130/80 for some chronic diseases (ask your care team about yours)    BMI (body mass index)  18.5-25 kg/m2 (based on height and weight)     Thank you for visiting [facility]    Normal or non-critical lab and imaging results will be communicated to you by MyChart, letter or phone within 7 days.  If you do not hear from us within 10 days, please call the clinic. If you have a critical or abnormal lab result, we will notify you by phone as soon as possible.     If you have any questions regarding your visit please contact:     Team Comfort:   Clinic Hours Telephone Number   Dr. Boogie Flowers Dr. [place] 7am-5pm  Monday - Friday [phone]  Marcie RN  Dariela RN  Christie RN   Pharmacy 8:00am-8pm Monday-Friday    9am-5pm Saturday-Sunday [phone]   Urgent Care 11am-9pm Monday-Friday        9am-5pm Saturday-Sunday [phone]     After hours, weekend or if you need to make an appointment with your primary provider please call [phone].   After Hours nurse advise: call [facility]: [phone]    Medication Refills:  Call your pharmacy and they will forward the refill to us. Please allow 3 business days for your refills to be completed.

## 2019-03-21 RX ORDER — CHOLESTYRAMINE 4 G/9G
1 POWDER, FOR SUSPENSION ORAL
Qty: 30 PACKET | Refills: 2 | Status: SHIPPED | OUTPATIENT
Start: 2019-03-21 | End: 2019-11-14

## 2019-03-22 ENCOUNTER — TELEPHONE (OUTPATIENT)
Dept: FAMILY MEDICINE | Facility: CLINIC | Age: 59
End: 2019-03-22

## 2019-03-22 LAB — CALPROTECTIN STL-MCNT: <27 MG/KG (ref 0–49.9)

## 2019-03-22 NOTE — TELEPHONE ENCOUNTER
Notes recorded by Real Daniel on 3/22/2019 at 3:09 PM CDT  This writer attempted to contact Jose on 03/22/19    Reason for call results and left message to return call.    When patient calls back, please contact 1st floor Tahira Moya. routine priority.        Real Daniel    ------    Notes recorded by Ky Stone MD on 3/21/2019 at 10:52 PM CDT  Stool culture is negative despite diarrhea.  Test for C. Difficile is negative.  Stop Metronidazole.  Start Cholestyramine for diarrhea. Rx sent.    (Call patient via ).

## 2019-03-25 ENCOUNTER — TELEPHONE (OUTPATIENT)
Dept: FAMILY MEDICINE | Facility: CLINIC | Age: 59
End: 2019-03-25

## 2019-03-25 DIAGNOSIS — R19.7 DIARRHEA OF PRESUMED INFECTIOUS ORIGIN: Primary | ICD-10-CM

## 2019-03-25 NOTE — TELEPHONE ENCOUNTER
Reason for Call:  Medication or medication refill:    Do you use a [facility]?  Name of the pharmacy and phone number for the current request:  [facility] - [phone]    Name of the medication requested: Diarrhea medication    Other request: Patient was prescribed medication, but it is not covered by his insurance and is too expensive. Is hoping an alternative can be prescribed.     Can we leave a detailed message on this number? YES    Phone number patient can be reached at: Home number on file [phone] (home)    Best Time: Anytime    Call taken on 3/25/2019 at 3:48 PM by Peyton Turcios

## 2019-03-25 NOTE — TELEPHONE ENCOUNTER
Called patient and informed. Spelled out new medication for him. He will follow up with pharmacy regarding .    Real Daniel CMA

## 2019-04-01 RX ORDER — AZITHROMYCIN 500 MG/1
500 TABLET, FILM COATED ORAL DAILY
Qty: 3 TABLET | Refills: 0 | Status: SHIPPED | OUTPATIENT
Start: 2019-04-01 | End: 2019-04-15

## 2019-04-01 NOTE — TELEPHONE ENCOUNTER
Informed patient of med change below. Patient verbalized understanding. Also informed pharmacy of med change.    ERIC Mendoza MA

## 2019-04-08 ENCOUNTER — TELEPHONE (OUTPATIENT)
Dept: FAMILY MEDICINE | Facility: CLINIC | Age: 59
End: 2019-04-08

## 2019-04-08 NOTE — TELEPHONE ENCOUNTER
Reason for Call:  Medication or medication refill:    Do you use a [facility]?  Name of the pharmacy and phone number for the current request:  [id number] - [place], [place] - [address] AT [facility] [address]    Name of the medication requested: Wants call back with Sinhala  not Hmong about refill on metronidazole 500 mg (for diarhrea).     Other request: Please call when approved.    Can we leave a detailed message on this number? YES    Phone number patient can be reached at: Cell number on file:    Telephone Information:   Mobile [phone]     Best Time: any    Call taken on 4/8/2019 at 3:55 PM by Margarita Seo

## 2019-04-08 NOTE — TELEPHONE ENCOUNTER
This writer attempted to contact Jose on 04/08/19      Reason for call clarify message taken and left message.      If patient calls back:   Registered Nurse called. Follow Triage Call workflow        Payal De Oliveira, RN

## 2019-04-09 NOTE — TELEPHONE ENCOUNTER
Patient called back saying his insurance has not changed, he has [employer]. He wants to schedule appointment. Appointment scheduled for 6:20 pm tomorrow, with Conor Marroquin.     Email sent to supervisor to review patient's insurance/appointment.     Dariela Denis RN

## 2019-04-09 NOTE — TELEPHONE ENCOUNTER
Called and spoke with patient regarding medication request below.     Patient having continued diarrhea on daily basis and stomach upset. Has been ongoing for few weeks now. Was seen by PCP on 3/19/19 and treated. Symptoms have not resolved. Patient states stools are liquid in form and has upset stomach, especially in the mornings when waking up. Tries to avoid certain foods, particularly spicy foods to not upset stomach.  Writer advised to be seen in office for further evaluation. Recommend apt as soon as able. Patient asking for late in day apts. Was going to try to schedule with Conor Marroquin for tomorrow at 6:20 pm, but system gave warning regarding scheduling with this provider due to insurance listed in chart. Patient noted he has recently changed insurance to [employer]. Patient is going to contact insurance company and make sure can still come to [place]. If allowed, will call back and schedule apt for tomorrow. Would like 6:20 apt with Conor Marroquin tomorrow (4/10/19) if still available. Okay for this apt time and date.  Will call back if has additional questions.    Marilin Buchanan RN

## 2019-04-09 NOTE — TELEPHONE ENCOUNTER
Jose returned call    Best number to reach caller: Home number on file [phone] (home)    Is it ok to leave a detailed message: YES

## 2019-04-15 ENCOUNTER — OFFICE VISIT (OUTPATIENT)
Dept: FAMILY MEDICINE | Facility: CLINIC | Age: 59
End: 2019-04-15
Payer: COMMERCIAL

## 2019-04-15 VITALS
OXYGEN SATURATION: 94 % | HEART RATE: 72 BPM | TEMPERATURE: 98.9 F | DIASTOLIC BLOOD PRESSURE: 84 MMHG | RESPIRATION RATE: 18 BRPM | SYSTOLIC BLOOD PRESSURE: 132 MMHG | WEIGHT: 148 LBS | HEIGHT: 62 IN | BODY MASS INDEX: 27.23 KG/M2

## 2019-04-15 DIAGNOSIS — J00 ACUTE NASOPHARYNGITIS: Primary | ICD-10-CM

## 2019-04-15 DIAGNOSIS — K21.9 GASTROESOPHAGEAL REFLUX DISEASE, ESOPHAGITIS PRESENCE NOT SPECIFIED: ICD-10-CM

## 2019-04-15 LAB
DEPRECATED S PYO AG THROAT QL EIA: NORMAL
FLUAV+FLUBV AG SPEC QL: NEGATIVE
FLUAV+FLUBV AG SPEC QL: NEGATIVE
SPECIMEN SOURCE: NORMAL
SPECIMEN SOURCE: NORMAL

## 2019-04-15 PROCEDURE — 87081 CULTURE SCREEN ONLY: CPT | Performed by: PHYSICIAN ASSISTANT

## 2019-04-15 PROCEDURE — 99214 OFFICE O/P EST MOD 30 MIN: CPT | Performed by: PHYSICIAN ASSISTANT

## 2019-04-15 PROCEDURE — 87880 STREP A ASSAY W/OPTIC: CPT | Performed by: PHYSICIAN ASSISTANT

## 2019-04-15 PROCEDURE — 87804 INFLUENZA ASSAY W/OPTIC: CPT | Performed by: PHYSICIAN ASSISTANT

## 2019-04-15 RX ORDER — GUAIFENESIN AND DEXTROMETHORPHAN HYDROBROMIDE 1200; 60 MG/1; MG/1
1 TABLET, EXTENDED RELEASE ORAL 2 TIMES DAILY
Qty: 28 TABLET | Refills: 0 | Status: SHIPPED | OUTPATIENT
Start: 2019-04-15 | End: 2019-11-14

## 2019-04-15 ASSESSMENT — MIFFLIN-ST. JEOR: SCORE: 1370.57

## 2019-04-15 ASSESSMENT — PAIN SCALES - GENERAL: PAINLEVEL: NO PAIN (0)

## 2019-04-15 NOTE — PROGRESS NOTES
SUBJECTIVE:   Jose Gaines is a 58 year old male who presents to clinic today for the following   health issues:      Acute Illness   Acute illness concerns: cough and sore throat   Onset: 5 days     Fever: no    Chills/Sweats: YES- sweats     Headache (location?): YES    Sinus Pressure:YES    Conjunctivitis:  no    Ear Pain: no    Rhinorrhea: YES    Congestion: no    Sore Throat: YES     Cough: YES-productive of sputum    Wheeze: no    Decreased Appetite: no    Nausea: no    Vomiting: no    Diarrhea:  YES    Dysuria/Freq.: no    Fatigue/Achiness: no    Sick/Strep Exposure: no     Therapies Tried and outcome: Advil and Theraflu     Additional history: as documented    GERD:    Onset: 1 year  Symptoms: burning in epigastric region, diarrhea    Associated symptoms: none     Treatment : over the counter antacid and Mylanta-helped , patient was given Metronidazole which also helped     Reviewed  and updated as needed this visit by clinical staff  Tobacco  Allergies  Meds  Problems  Med Hx  Surg Hx  Fam Hx  Soc Hx          Reviewed and updated as needed this visit by Provider  Tobacco  Allergies  Meds  Problems  Med Hx  Surg Hx  Fam Hx         Patient Active Problem List   Diagnosis     Seasonal allergic rhinitis     CARDIOVASCULAR SCREENING; LDL GOAL LESS THAN 130     Chronic hepatitis B (H)     Essential hypertension     Hyperlipidemia     Internal hemorrhoid     Language barrier     Oral ulcer     Overweight     Past Surgical History:   Procedure Laterality Date     SURGICAL HISTORY OF -   1/31/05 & 8/05    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       Social History     Tobacco Use     Smoking status: Never Smoker     Smokeless tobacco: Never Used   Substance Use Topics     Alcohol use: Yes     Family History   Problem Relation Age of Onset     Diabetes Father 50     Diabetes Brother         unknown age of onset         Current Outpatient Medications   Medication Sig Dispense Refill     aspirin 81 MG EC tablet  "Take 81 mg by mouth daily       Dextromethorphan-Guaifenesin  MG TB12 Take 1 tablet by mouth 2 times daily 28 tablet 0     multivitamin  with lutein (OCUVITE WITH LTEIN) CAPS per capsule Take 1 capsule by mouth daily 30 capsule 11     omeprazole (PRILOSEC) 20 MG DR capsule Take 1 capsule (20 mg) by mouth daily 90 capsule 1     simvastatin (ZOCOR) 20 MG tablet Take 1 tablet (20 mg) by mouth At Bedtime 90 tablet 3     triamterene-HCTZ (DYAZIDE) 37.5-25 MG capsule Take 1 capsule by mouth every morning 90 capsule 3     valACYclovir (VALTREX) 500 MG tablet Take 1 tablet (500 mg) by mouth daily 90 tablet 3     cholestyramine (QUESTRAN) 4 g packet Take 1 packet (4 g) by mouth 3 times daily (with meals) for 10 days 30 packet 2     valACYclovir (VALTREX) 1000 mg tablet Take 1 tablet (1,000 mg) by mouth 2 times daily for 10 days 20 tablet 3     No Known Allergies    ROS:  Constitutional, HEENT, cardiovascular, pulmonary, gi and gu systems are negative, except as otherwise noted.    OBJECTIVE:     /84 (BP Location: Right arm, Patient Position: Sitting, Cuff Size: Adult Large)   Pulse 72   Temp 98.9  F (37.2  C) (Oral)   Resp 18   Ht 1.575 m (5' 2\")   Wt 67.1 kg (148 lb)   SpO2 94%   BMI 27.07 kg/m    Body mass index is 27.07 kg/m .  GENERAL: healthy, alert and no distress  EYES: Eyes grossly normal to inspection, PERRL and conjunctivae and sclerae normal  HENT: normal cephalic/atraumatic, ear canals and TM's normal, nose and mouth without ulcers or lesions, oral mucous membranes moist and tonsillar erythema  NECK: no adenopathy, no asymmetry, masses, or scars and thyroid normal to palpation  RESP: lungs clear to auscultation - no rales, rhonchi or wheezes  CV: regular rate and rhythm, normal S1 S2, no S3 or S4, no murmur, click or rub, no peripheral edema and peripheral pulses strong  ABDOMEN: soft, nontender, no hepatosplenomegaly, no masses and bowel sounds normal  MS: no gross musculoskeletal defects " noted, no edema    Diagnostic Test Results:  No results found for this or any previous visit (from the past 24 hour(s)).    ASSESSMENT/PLAN:       ICD-10-CM    1. Acute nasopharyngitis J00 Strep, Rapid Screen     Influenza A/B antigen     Beta strep group A culture     Dextromethorphan-Guaifenesin  MG TB12   2. Gastroesophageal reflux disease, esophagitis presence not specified K21.9 omeprazole (PRILOSEC) 20 MG DR capsule     1. Mucinex DM 1 tablet twice a day for cough and mucous  Cepacol lozenges (available over the counter) every 2 hours as needed for throat pain    2.Omeprazole 1 tablet in the morning on empty stomach 30 min before breakfast       Bita Mendoza PA-C  [facility]

## 2019-04-15 NOTE — PATIENT INSTRUCTIONS
Mucinex DM 1 tablet twice a day for cough and mucous  Cepacol lozenges (available over the counter) every 2 hours as needed for throat pain    Omeprazole 1 tablet in the morning on empty stomach 30 min before breakfast               Patient Education     Understanding the Cold Virus  Colds are the most common illness that people get. Most adults get 2 or 3 colds per year, and most children get 5 to 7 colds per year. Colds may be caused by over 200 types of viruses. The most common of these are rhinoviruses ( rhino  refers to the nose).  What causes a cold virus?  All colds start with infection by a virus. You can be infected by more than one cold virus at a time. Infection with cold viruses happens when:    You breathe in a virus from the air. This can happen when someone with a cold sneezes or coughs near you.    You touch your eyes, nose, or mouth when your hand has a cold virus on it. This can happen if you touch an object that has the cold virus on it.  What are the symptoms of a cold virus?  Almost all colds involve a stuffy nose. Other common symptoms include:    Runny nose    Sneezing    Sore throat    Headache    Cough  How is a cold treated?  Colds usually last 5 to 10 days. Treatment focuses on relieving symptoms. Treatments may include:    Decongestant medicines. Several types of decongestants are available without prescription. These may help reduce stuffy or runny nose symptoms.    Prescription or over-the-counter nasal sprays. These may help reduce nasal symptoms, including stuffiness.    Prescription or over-the-counter pain medicines. These can help with headaches and sore throat.    Self-care. This includes extra rest, using humidifiers, and drinking more fluids. These help you feel better while you are getting over a cold.  Antibiotics are not helpful for a cold. They do not make a cold shorter or relieve symptoms. Taking antibiotics when you don t need them can make them work less well when you  need them for another illness.  Follow all directions for using medicines, especially when giving them to children. Contact your healthcare provider if you have any questions about using cold medicines safely.  Can a cold be prevented?  You can help reduce the spread of cold viruses. This can help both you and others avoid getting colds. Follow these tips:    Wash your hands well anytime you may have come into contact with cold viruses. Wash your hands for at least 20 seconds. When you can t wash with soap and water, use an alcohol-based hand .    Don t touch your nose, eyes, or mouth, especially after touching something that may have a cold virus on it.    Cover your mouth and nose when you cough or sneeze. Throw away tissues after using them.    Disinfect things you touch often, such as phones and keyboards.      Stay home when you have a cold.  What are the possible complications of a cold virus?  Colds usually go away by themselves. But it s not unusual to get another type of infection while you have a cold. These can include:    Sinus infection    Lung infection, such as bronchitis or pneumonia    Ear infection  If you have asthma or chronic bronchitis, a cold can make your condition worse.     When should I call my healthcare provider?  Call your healthcare provider right away if you have any of these:    Fever of 100.4 F (38 C) or higher, or as directed    Cough, chest pain, or shortness of breath that gets worse    Symptoms don t get better or get worse after about 10 days    Headache, sleepiness, or confusion that gets worse   Date Last Reviewed: 3/28/2016    1462-9803 [employer]. [address] [place] [address], [place] [address]. All rights reserved. This information is not intended as a substitute for professional medical care. Always follow your healthcare professional's instructions.           Patient Education     Lifestyle Changes for Controlling GERD  When you have GERD, stomach acid  feels as if it s backing up toward your mouth. Whether or not you take medicine to control your GERD, your symptoms can often be improved with lifestyle changes. Talk to your healthcare provider about the following suggestions. These suggestions may help you get relief from your symptoms.      Raise your head  Reflux is more likely to strike when you re lying down flat, because stomach fluid can flow backward more easily. Raising the head of your bed 4 to 6 inches can help. To do this:    Slide blocks or books under the legs at the head of your bed. Or, place a wedge under the mattress. Many [employer] can make a suitable wedge for you. The wedge should run from your waist to the top of your head.    Don t just prop your head on several pillows. This increases pressure on your stomach. It can make GERD worse.  Watch your eating habits  Certain foods may increase the acid in your stomach or relax the lower esophageal sphincter. This makes GERD more likely. It s best to avoid the following if they cause you symptoms:    Coffee, tea, and carbonated drinks (with and without caffeine)    Fatty, fried, or spicy food    Mint, chocolate, onions, and tomatoes    Peppermint    Any other foods that seem to irritate your stomach or cause you pain  Relieve the pressure  Tips include the following:    Eat smaller meals, even if you have to eat more often.    Don t lie down right after you eat. Wait a few hours for your stomach to empty.    Avoid tight belts and tight-fitting clothes.    Lose excess weight.  Tobacco and alcohol  Avoid smoking tobacco and drinking alcohol. They can make GERD symptoms worse.  Date Last Reviewed: 7/1/2016 2000-2018 [employer]. [address] [place] [address], [place] [address]. All rights reserved. This information is not intended as a substitute for professional medical care. Always follow your healthcare professional's instructions.

## 2019-04-16 LAB
BACTERIA SPEC CULT: NORMAL
SPECIMEN SOURCE: NORMAL

## 2019-09-19 ENCOUNTER — OFFICE VISIT (OUTPATIENT)
Dept: FAMILY MEDICINE | Facility: CLINIC | Age: 59
End: 2019-09-19
Payer: COMMERCIAL

## 2019-09-19 VITALS
BODY MASS INDEX: 26.87 KG/M2 | OXYGEN SATURATION: 95 % | HEART RATE: 71 BPM | WEIGHT: 146 LBS | HEIGHT: 62 IN | RESPIRATION RATE: 16 BRPM | SYSTOLIC BLOOD PRESSURE: 124 MMHG | DIASTOLIC BLOOD PRESSURE: 80 MMHG | TEMPERATURE: 98.7 F

## 2019-09-19 DIAGNOSIS — N50.89 GENITAL LESION, MALE: Primary | ICD-10-CM

## 2019-09-19 PROCEDURE — 86694 HERPES SIMPLEX NES ANTBDY: CPT | Performed by: INTERNAL MEDICINE

## 2019-09-19 PROCEDURE — 86696 HERPES SIMPLEX TYPE 2 TEST: CPT | Performed by: INTERNAL MEDICINE

## 2019-09-19 PROCEDURE — 99213 OFFICE O/P EST LOW 20 MIN: CPT | Performed by: INTERNAL MEDICINE

## 2019-09-19 PROCEDURE — 86695 HERPES SIMPLEX TYPE 1 TEST: CPT | Performed by: INTERNAL MEDICINE

## 2019-09-19 PROCEDURE — 36415 COLL VENOUS BLD VENIPUNCTURE: CPT | Performed by: INTERNAL MEDICINE

## 2019-09-19 PROCEDURE — 86780 TREPONEMA PALLIDUM: CPT | Performed by: INTERNAL MEDICINE

## 2019-09-19 RX ORDER — AZITHROMYCIN 250 MG/1
TABLET, FILM COATED ORAL
Qty: 8 TABLET | Refills: 1 | Status: SHIPPED | OUTPATIENT
Start: 2019-09-19 | End: 2019-09-26

## 2019-09-19 RX ORDER — CEFDINIR 300 MG/1
300 CAPSULE ORAL 2 TIMES DAILY
Qty: 10 CAPSULE | Refills: 1 | Status: SHIPPED | OUTPATIENT
Start: 2019-09-19 | End: 2019-09-26

## 2019-09-19 ASSESSMENT — PAIN SCALES - GENERAL: PAINLEVEL: NO PAIN (0)

## 2019-09-19 ASSESSMENT — MIFFLIN-ST. JEOR: SCORE: 1356.5

## 2019-09-19 NOTE — PROGRESS NOTES
Subjective     Jose Gaines is a 59 year old male who presents to clinic today for the following health issues:    HPI   Rash  Onset: acute    Description:   Location: groin area   Character: red  Itching (Pruritis): no     Progression of Symptoms:  same    Accompanying Signs & Symptoms:  Fever: no   Body aches or joint pain: no   Sore throat symptoms: no   Recent cold symptoms: no     History:   Previous similar rash: YES    Precipitating factors:   Exposure to similar rash: no   New exposures: None   Recent travel: no     Alleviating factors:  None     Therapies Tried and outcome: Valtrex and Asian medicine with no relief         Patient Active Problem List   Diagnosis     Seasonal allergic rhinitis     CARDIOVASCULAR SCREENING; LDL GOAL LESS THAN 130     Chronic hepatitis B (H)     Essential hypertension     Hyperlipidemia     Internal hemorrhoid     Language barrier     Oral ulcer     Overweight     Past Surgical History:   Procedure Laterality Date     SURGICAL HISTORY OF -   1/31/05 & 8/05    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       Social History     Tobacco Use     Smoking status: Never Smoker     Smokeless tobacco: Never Used   Substance Use Topics     Alcohol use: Yes     Family History   Problem Relation Age of Onset     Diabetes Father 50     Diabetes Brother         unknown age of onset         No Known Allergies  Recent Labs   Lab Test 03/04/19  0742 02/28/18  1148   LDL 89 118*   HDL 66 93   TRIG 97 74   ALT 46  --    CR 0.76 0.76   GFRESTIMATED >90 >90   GFRESTBLACK >90 >90   POTASSIUM 3.3* 3.7      BP Readings from Last 3 Encounters:   09/26/19 122/84   09/19/19 124/80   04/15/19 132/84    Wt Readings from Last 3 Encounters:   09/26/19 65.9 kg (145 lb 3.2 oz)   09/19/19 66.2 kg (146 lb)   04/15/19 67.1 kg (148 lb)                      Reviewed and updated as needed this visit by Provider         Review of Systems   ROS COMP: CONSTITUTIONAL: NEGATIVE for fever, chills, change in weight  INTEGUMENTARY/SKIN:  "NEGATIVE for worrisome rashes, moles or lesions  EYES: NEGATIVE for vision changes or irritation  ENT/MOUTH: NEGATIVE for ear, mouth and throat problems  RESP: NEGATIVE for significant cough or SOB  CV: NEGATIVE for chest pain, palpitations or peripheral edema  GI: NEGATIVE for nausea, abdominal pain, heartburn, or change in bowel habits   male :As above.  MUSCULOSKELETAL: NEGATIVE for significant arthralgias or myalgia  NEURO: NEGATIVE for weakness, dizziness or paresthesias  ENDOCRINE: NEGATIVE for temperature intolerance, skin/hair changes  HEME: NEGATIVE for bleeding problems  PSYCHIATRIC: NEGATIVE for changes in mood or affect      Objective    /80 (BP Location: Right arm, Patient Position: Chair, Cuff Size: Adult Regular)   Pulse 71   Temp 98.7  F (37.1  C) (Oral)   Resp 16   Ht 1.575 m (5' 2\")   Wt 66.2 kg (146 lb)   SpO2 95%   BMI 26.70 kg/m     Physical Exam   GENERAL: healthy, alert and no distress  EYES: Eyes grossly normal to inspection  HENT: normal cephalic/atraumatic and oral mucous membranes moist  CV: regular rate and rhythm, normal S1 S2, no S3 or S4, no murmur, click or rub, no peripheral edema and peripheral pulses strong  MS: no gross musculoskeletal defects noted, no edema  SKIN: Multiple papular lesions papular lesions in the suprapubic area and single ulcer on the glans penis.  NEURO: Normal strength and tone, mentation intact and speech normal  PSYCH: mentation appears normal, affect normal/bright    Diagnostic Test Results:  Results for orders placed or performed in visit on 09/19/19   Herpes: HSV IgM antibody   Result Value Ref Range    Herpes Simplex Virus IgM Antibody 0.82 0.00 - 0.89 Index Value   Herpes Simplex Virus 1 and 2 IgG   Result Value Ref Range    Herpes Simplex Virus Type 1 IgG >8.0 (H) 0.0 - 0.8 AI    Herpes Simplex Virus Type 2 IgG >8.0 (H) 0.0 - 0.8 AI   Treponema Abs w Reflex to RPR and Titer   Result Value Ref Range    Treponema Antibodies Nonreactive " "NR^Nonreactive           Assessment & Plan     1. Genital lesion, male  Most likely to staphylococcal infection since HSV IgM test is negative.   - Herpes: HSV IgM antibody  - Herpes Simplex Virus 1 and 2 IgG  - Treponema Abs w Reflex to RPR and Titer     BMI:   Estimated body mass index is 26.7 kg/m  as calculated from the following:    Height as of this encounter: 1.575 m (5' 2\").    Weight as of this encounter: 66.2 kg (146 lb).   Weight management plan: dietary changes.        FUTURE APPOINTMENTS:       - Follow-up visit in one week.      Ky Stone MD  [facility]        "

## 2019-09-20 LAB
HSV1 IGG SERPL QL IA: >8 AI (ref 0–0.8)
HSV2 IGG SERPL QL IA: >8 AI (ref 0–0.8)
T PALLIDUM AB SER QL: NONREACTIVE

## 2019-09-24 LAB — HSV 1+2 IGM SER-IMP: 0.82 INDEX VALUE (ref 0–0.89)

## 2019-09-26 ENCOUNTER — OFFICE VISIT (OUTPATIENT)
Dept: FAMILY MEDICINE | Facility: CLINIC | Age: 59
End: 2019-09-26
Payer: COMMERCIAL

## 2019-09-26 VITALS
DIASTOLIC BLOOD PRESSURE: 84 MMHG | SYSTOLIC BLOOD PRESSURE: 122 MMHG | BODY MASS INDEX: 26.56 KG/M2 | TEMPERATURE: 98.7 F | HEART RATE: 74 BPM | WEIGHT: 145.2 LBS

## 2019-09-26 DIAGNOSIS — R76.8 HSV-2 SEROPOSITIVE: ICD-10-CM

## 2019-09-26 PROCEDURE — 99213 OFFICE O/P EST LOW 20 MIN: CPT | Performed by: INTERNAL MEDICINE

## 2019-09-26 RX ORDER — VALACYCLOVIR HYDROCHLORIDE 500 MG/1
500 TABLET, FILM COATED ORAL DAILY
Qty: 90 TABLET | Refills: 3 | Status: SHIPPED | OUTPATIENT
Start: 2019-09-26 | End: 2020-04-27

## 2019-09-26 ASSESSMENT — PAIN SCALES - GENERAL: PAINLEVEL: NO PAIN (0)

## 2019-09-26 NOTE — PROGRESS NOTES
Subjective     Jose Gaines is a 59 year old male who presents to clinic today for the following health issues:    HPI   Follow up from visit on 9/19/2019  Patient states symptoms are better but not completely gone        Patient Active Problem List   Diagnosis     Seasonal allergic rhinitis     CARDIOVASCULAR SCREENING; LDL GOAL LESS THAN 130     Chronic hepatitis B (H)     Essential hypertension     Hyperlipidemia     Internal hemorrhoid     Language barrier     Oral ulcer     Overweight     Past Surgical History:   Procedure Laterality Date     SURGICAL HISTORY OF -   1/31/05 & 8/05    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       Social History     Tobacco Use     Smoking status: Never Smoker     Smokeless tobacco: Never Used   Substance Use Topics     Alcohol use: Yes     Family History   Problem Relation Age of Onset     Diabetes Father 50     Diabetes Brother         unknown age of onset         No Known Allergies  Recent Labs   Lab Test 03/04/19  0742 02/28/18  1148   LDL 89 118*   HDL 66 93   TRIG 97 74   ALT 46  --    CR 0.76 0.76   GFRESTIMATED >90 >90   GFRESTBLACK >90 >90   POTASSIUM 3.3* 3.7      BP Readings from Last 3 Encounters:   09/26/19 122/84   09/19/19 124/80   04/15/19 132/84    Wt Readings from Last 3 Encounters:   09/26/19 65.9 kg (145 lb 3.2 oz)   09/19/19 66.2 kg (146 lb)   04/15/19 67.1 kg (148 lb)                    Reviewed and updated as needed this visit by Provider         Review of Systems   ROS COMP: CONSTITUTIONAL: NEGATIVE for fever, chills, change in weight  INTEGUMENTARY/SKIN: NEGATIVE for worrisome rashes, moles or lesions  EYES: NEGATIVE for vision changes or irritation  ENT/MOUTH: NEGATIVE for ear, mouth and throat problems  RESP: NEGATIVE for significant cough or SOB  CV: NEGATIVE for chest pain, palpitations or peripheral edema  GI: NEGATIVE for nausea, abdominal pain, heartburn, or change in bowel habits  : NEGATIVE for frequency, dysuria, or hematuria  MUSCULOSKELETAL: NEGATIVE  "for significant arthralgias or myalgia  NEURO: NEGATIVE for weakness, dizziness or paresthesias  ENDOCRINE: NEGATIVE for temperature intolerance, skin/hair changes  HEME: NEGATIVE for bleeding problems  PSYCHIATRIC: NEGATIVE for changes in mood or affect      Objective    There were no vitals taken for this visit.  There is no height or weight on file to calculate BMI.  Physical Exam   GENERAL: healthy, alert and no distress  EYES: Eyes grossly normal to inspection, PERRL and conjunctivae and sclerae normal  HENT: ear canals and TM's normal, nose and mouth without ulcers or lesions  NECK: no adenopathy, no asymmetry, masses, or scars and thyroid normal to palpation  RESP: lungs clear to auscultation - no rales, rhonchi or wheezes  CV: regular rate and rhythm, normal S1 S2, no S3 or S4, no murmur, click or rub, no peripheral edema and peripheral pulses strong  ABDOMEN: soft, nontender, no hepatosplenomegaly, no masses and bowel sounds normal  MS: no gross musculoskeletal defects noted, no edema  NEURO: Normal strength and tone, mentation intact and speech normal  PSYCH: mentation appears normal, affect normal/bright    Diagnostic Test Results:  Results for orders placed or performed in visit on 09/19/19   Herpes: HSV IgM antibody   Result Value Ref Range    Herpes Simplex Virus IgM Antibody 0.82 0.00 - 0.89 Index Value   Herpes Simplex Virus 1 and 2 IgG   Result Value Ref Range    Herpes Simplex Virus Type 1 IgG >8.0 (H) 0.0 - 0.8 AI    Herpes Simplex Virus Type 2 IgG >8.0 (H) 0.0 - 0.8 AI   Treponema Abs w Reflex to RPR and Titer   Result Value Ref Range    Treponema Antibodies Nonreactive NR^Nonreactive           Assessment & Plan     1. HSV-2 seropositive    - valACYclovir (VALTREX) 500 MG tablet; Take 1 tablet (500 mg) by mouth daily  Dispense: 90 tablet; Refill: 3     BMI:   Estimated body mass index is 26.56 kg/m  as calculated from the following:    Height as of 9/19/19: 1.575 m (5' 2\").    Weight as of this " encounter: 65.9 kg (145 lb 3.2 oz).   Weight management plan: diet and exercise.        FUTURE APPOINTMENTS:       - Follow-up visit as needed.      Ky Stone MD  [facility]

## 2019-10-31 ENCOUNTER — TRANSFERRED RECORDS (OUTPATIENT)
Dept: HEALTH INFORMATION MANAGEMENT | Facility: CLINIC | Age: 59
End: 2019-10-31

## 2019-11-14 ENCOUNTER — OFFICE VISIT (OUTPATIENT)
Dept: FAMILY MEDICINE | Facility: CLINIC | Age: 59
End: 2019-11-14
Payer: COMMERCIAL

## 2019-11-14 ENCOUNTER — ANCILLARY PROCEDURE (OUTPATIENT)
Dept: GENERAL RADIOLOGY | Facility: CLINIC | Age: 59
End: 2019-11-14
Attending: INTERNAL MEDICINE
Payer: COMMERCIAL

## 2019-11-14 VITALS
SYSTOLIC BLOOD PRESSURE: 147 MMHG | WEIGHT: 147 LBS | HEIGHT: 62 IN | BODY MASS INDEX: 27.05 KG/M2 | TEMPERATURE: 98.4 F | OXYGEN SATURATION: 97 % | HEART RATE: 79 BPM | DIASTOLIC BLOOD PRESSURE: 83 MMHG

## 2019-11-14 DIAGNOSIS — M25.561 PAIN OF MENISCUS OF RIGHT KNEE: Primary | ICD-10-CM

## 2019-11-14 DIAGNOSIS — I10 UNCONTROLLED HYPERTENSION, STAGE 1: ICD-10-CM

## 2019-11-14 PROCEDURE — 99214 OFFICE O/P EST MOD 30 MIN: CPT | Performed by: INTERNAL MEDICINE

## 2019-11-14 PROCEDURE — 73562 X-RAY EXAM OF KNEE 3: CPT | Mod: RT

## 2019-11-14 ASSESSMENT — MIFFLIN-ST. JEOR: SCORE: 1361.04

## 2019-11-14 ASSESSMENT — PAIN SCALES - GENERAL: PAINLEVEL: SEVERE PAIN (6)

## 2019-11-14 NOTE — PROGRESS NOTES
Subjective     Jose Gaines is a 59 year old male who presents to clinic today for the following health issues:    HPI   Joint Pain    Onset: about 6-7 months    Description:   Location: right knee  Character: Sharp and Tight    Intensity: 6/10    Progression of Symptoms: worse    Accompanying Signs & Symptoms:  Other symptoms: swelling    History:   Previous similar pain: no       Precipitating factors:   Trauma or overuse: YES    Alleviating factors:  Improved by: nothing  Therapies Tried and outcome: Ibuprofen is not effective.      Patient Active Problem List   Diagnosis     Seasonal allergic rhinitis     CARDIOVASCULAR SCREENING; LDL GOAL LESS THAN 130     Chronic hepatitis B (H)     Essential hypertension     Hyperlipidemia     Internal hemorrhoid     Language barrier     Oral ulcer     Overweight     Past Surgical History:   Procedure Laterality Date     SURGICAL HISTORY OF -   1/31/05 & 8/05    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       Social History     Tobacco Use     Smoking status: Never Smoker     Smokeless tobacco: Never Used   Substance Use Topics     Alcohol use: Yes     Family History   Problem Relation Age of Onset     Diabetes Father 50     Diabetes Brother         unknown age of onset         No Known Allergies  Recent Labs   Lab Test 03/04/19  0742 02/28/18  1148   LDL 89 118*   HDL 66 93   TRIG 97 74   ALT 46  --    CR 0.76 0.76   GFRESTIMATED >90 >90   GFRESTBLACK >90 >90   POTASSIUM 3.3* 3.7      BP Readings from Last 3 Encounters:   11/14/19 (!) 147/83   09/26/19 122/84   09/19/19 124/80    Wt Readings from Last 3 Encounters:   11/14/19 66.7 kg (147 lb)   09/26/19 65.9 kg (145 lb 3.2 oz)   09/19/19 66.2 kg (146 lb)                      Reviewed and updated as needed this visit by Provider         Review of Systems   ROS COMP: CONSTITUTIONAL: NEGATIVE for fever, chills, change in weight  INTEGUMENTARY/SKIN: NEGATIVE for worrisome rashes, moles or lesions  EYES: NEGATIVE for vision changes or  "irritation  ENT/MOUTH: NEGATIVE for ear, mouth and throat problems  RESP: NEGATIVE for significant cough or SOB  CV: NEGATIVE for chest pain, palpitations or peripheral edema  GI: NEGATIVE for nausea, abdominal pain, heartburn, or change in bowel habits  : NEGATIVE for frequency, dysuria, or hematuria  MUSCULOSKELETAL:as above.  NEURO: NEGATIVE for weakness, dizziness or paresthesias  ENDOCRINE: NEGATIVE for temperature intolerance, skin/hair changes  HEME: NEGATIVE for bleeding problems  PSYCHIATRIC: NEGATIVE for changes in mood or affect      Objective    BP (!) 147/83   Pulse 79   Temp 98.4  F (36.9  C) (Oral)   Ht 1.575 m (5' 2\")   Wt 66.7 kg (147 lb)   SpO2 97%   BMI 26.89 kg/m    Body mass index is 26.89 kg/m .  Physical Exam   GENERAL: healthy, alert and no distress  EYES: Eyes grossly normal to inspection  HENT: normal cephalic/atraumatic, oropharynx clear and oral mucous membranes moist  MS: Tenderness and swelling of the right knee.  SKIN: no suspicious lesions or rashes  NEURO: Normal strength and tone, mentation intact and speech normal  PSYCH: mentation appears normal, affect normal/bright    Diagnostic Test Results:  Examination:  XR KNEE RT 3 VW     Date:  11/14/2019 12:38 PM      Clinical Information: Pain of meniscus of right knee      Additional Information: none     Comparison: none                                                                      Impression:  Mild-to-moderate tricompartmental degenerative arthrosis, greatest in  the lateral and patellofemoral compartments, where there is partial  joint space narrowing and marginal osteophytes. Moderate knee joint  effusion. No fracture or bone lesion. Normal periarticular soft  tissues.     ANNE MARIE BIRMINGHAM MD        Assessment & Plan     1. Pain of meniscus of right knee  Most probable etiology of right knee pain. Requires MRI to confirms clinical suspicion.  - XR Knee Right 3 Views  - diclofenac (VOLTAREN) 50 MG EC tablet; Take 1 " "tablet (50 mg) by mouth 3 times daily as needed for moderate pain Take with food.  Dispense: 30 tablet; Refill: 5  - MR Knee Right w/o Contrast; Future  - ORTHOPEDICS ADULT REFERRAL; Future    2. Uncontrolled hypertension, stage 1  Not well-controlled mainly due to condition #1.       BMI:   Estimated body mass index is 26.89 kg/m  as calculated from the following:    Height as of this encounter: 1.575 m (5' 2\").    Weight as of this encounter: 66.7 kg (147 lb).   Weight management plan: diet and exercise.        FUTURE APPOINTMENTS:       - Follow-up visit in 4 weeks.      Ky Stone MD  [facility]      "

## 2019-11-14 NOTE — PATIENT INSTRUCTIONS
At [facility], we strive to deliver an exceptional experience to you, every time we see you. If you receive a survey, please complete it as we do value your feedback.  If you have MyChart, you can expect to receive results automatically within 24 hours of their completion.  Your provider will send a note interpreting your results as well.   If you do not have MyChart, you should receive your results in about a week by mail.    Your care team:                            Family Medicine Internal Medicine   MD Ky Crawley MD Shantel Branch-Fleming, MD Katya Georgiev PA-C Megan Hill, APRKISHORE Flowers, MD Pediatrics   Conor Marroquin, PANicoleC  Sandy Quinteros, MD Theresa Jeter APRN CNP   MD Ronda Angelo MD Deborah Mielke, MD Kim Thein, APRN CNP      Clinic hours: Monday - Thursday 7 am-7 pm; Fridays 7 am-5 pm.   Urgent care: Monday - Friday 11 am-9 pm; Saturday and Sunday 9 am-5 pm.  Pharmacy : Monday -Thursday 8 am-8 pm; Friday 8 am-6 pm; Saturday and Sunday 9 am-5 pm.     Clinic: [phone]   Pharmacy: [phone]

## 2019-11-24 PROBLEM — M25.561 PAIN OF MENISCUS OF RIGHT KNEE: Status: ACTIVE | Noted: 2019-11-24

## 2019-11-25 ENCOUNTER — ANCILLARY PROCEDURE (OUTPATIENT)
Dept: MRI IMAGING | Facility: CLINIC | Age: 59
End: 2019-11-25
Attending: INTERNAL MEDICINE
Payer: COMMERCIAL

## 2019-11-25 DIAGNOSIS — M25.561 PAIN OF MENISCUS OF RIGHT KNEE: ICD-10-CM

## 2019-11-25 PROCEDURE — 73721 MRI JNT OF LWR EXTRE W/O DYE: CPT | Mod: RT | Performed by: RADIOLOGY

## 2019-12-05 ENCOUNTER — OFFICE VISIT (OUTPATIENT)
Dept: ORTHOPEDICS | Facility: CLINIC | Age: 59
End: 2019-12-05
Payer: COMMERCIAL

## 2019-12-05 VITALS
RESPIRATION RATE: 16 BRPM | HEIGHT: 64 IN | WEIGHT: 149 LBS | SYSTOLIC BLOOD PRESSURE: 162 MMHG | BODY MASS INDEX: 25.44 KG/M2 | DIASTOLIC BLOOD PRESSURE: 92 MMHG

## 2019-12-05 DIAGNOSIS — G89.29 CHRONIC PAIN OF RIGHT KNEE: ICD-10-CM

## 2019-12-05 DIAGNOSIS — M17.11 PRIMARY OSTEOARTHRITIS OF RIGHT KNEE: Primary | ICD-10-CM

## 2019-12-05 DIAGNOSIS — M25.461 KNEE EFFUSION, RIGHT: ICD-10-CM

## 2019-12-05 DIAGNOSIS — M25.561 CHRONIC PAIN OF RIGHT KNEE: ICD-10-CM

## 2019-12-05 PROCEDURE — 20610 DRAIN/INJ JOINT/BURSA W/O US: CPT | Mod: RT | Performed by: ORTHOPAEDIC SURGERY

## 2019-12-05 PROCEDURE — 99203 OFFICE O/P NEW LOW 30 MIN: CPT | Mod: 25 | Performed by: ORTHOPAEDIC SURGERY

## 2019-12-05 RX ORDER — METHYLPREDNISOLONE ACETATE 80 MG/ML
80 INJECTION, SUSPENSION INTRA-ARTICULAR; INTRALESIONAL; INTRAMUSCULAR; SOFT TISSUE
Status: DISCONTINUED | OUTPATIENT
Start: 2019-12-05 | End: 2020-04-27

## 2019-12-05 RX ORDER — BUPIVACAINE HYDROCHLORIDE 2.5 MG/ML
3 INJECTION, SOLUTION INFILTRATION; PERINEURAL
Status: DISCONTINUED | OUTPATIENT
Start: 2019-12-05 | End: 2020-04-27

## 2019-12-05 RX ORDER — LIDOCAINE HYDROCHLORIDE 10 MG/ML
4 INJECTION, SOLUTION INFILTRATION; PERINEURAL
Status: DISCONTINUED | OUTPATIENT
Start: 2019-12-05 | End: 2020-04-27

## 2019-12-05 RX ADMIN — METHYLPREDNISOLONE ACETATE 80 MG: 80 INJECTION, SUSPENSION INTRA-ARTICULAR; INTRALESIONAL; INTRAMUSCULAR; SOFT TISSUE at 15:53

## 2019-12-05 RX ADMIN — LIDOCAINE HYDROCHLORIDE 4 ML: 10 INJECTION, SOLUTION INFILTRATION; PERINEURAL at 15:53

## 2019-12-05 RX ADMIN — BUPIVACAINE HYDROCHLORIDE 3 ML: 2.5 INJECTION, SOLUTION INFILTRATION; PERINEURAL at 15:53

## 2019-12-05 ASSESSMENT — PAIN SCALES - GENERAL: PAINLEVEL: EXTREME PAIN (8)

## 2019-12-05 ASSESSMENT — MIFFLIN-ST. JEOR: SCORE: 1401.86

## 2019-12-05 NOTE — LETTER
"    12/5/2019         RE: Jose Gaines  [address]  [place] [address]        Dear Colleague,    Thank you for referring your patient, Jose Gaines, to the [facility] [place]. Please see a copy of my visit note below.    CHIEF COMPLAINT:   Chief Complaint   Patient presents with     Right Knee - Pain     Right knee pain. Onset: 2 months, much worse the last 4 weeks. Just ibuprofen for pain control. Had a cyst excised near tibia tubercle in ~2005, got infected and had I and D. Otherwise NKI.  on feet all day.   .    Jose Gaines is seen today in the [facility] for evaluation of right knee pain at the request of Ky Quezada      HISTORY OF PRESENT ILLNESS    Jose Gaines is a 59 year old male seen for evaluation of ongoing right knee pain with no known injury.   Pain has been present for 4-5 months,maybe longer, history is unclear, but worse the past 4 weeks. Locates pain in front and \"inside\" the knee. . Aggravated with weight bearing, walking but worse with stairs, sit to stand. Has swelling. Ok at rest, occasional night pain.  He works as a  on her feet all day. Treatment with ibuprofen, pain medications, knee sleeve.    History of pretibial bursal excision near the tibial tubercle ~2005 which became infected with resultant I+D, iv antibiotics and PICC line.    Present symptoms: pain anteriorly and diffuse, pain shooting, dull/achy , moderate pain, severe pain, mild swelling.    Pain severity: 8/10  Frequency of symptoms: are constant  Exacerbating Factors: weight bearing, stairs, [id number]  Relieving Factors: rest, sitting  Night Pain: Yes  Pain while at rest: No   Numbness or tingling: No   Patient has tried:     NSAIDS: Yes      Physical Therapy: No      Activity modification: No      Bracing: sleeve      Injections: No      Ice: No      Assistive device:  No    Orthopaedic PMH: right " knee pretibial/bursal excision with postop infection, I+D, 2005 (?[facility]).    Other PMH:  has a past medical history of Allergic rhinitis and ED (erectile dysfunction).  Patient Active Problem List   Diagnosis     Seasonal allergic rhinitis     CARDIOVASCULAR SCREENING; LDL GOAL LESS THAN 130     Chronic hepatitis B (H)     Essential hypertension     Hyperlipidemia     Internal hemorrhoid     Language barrier     Oral ulcer     Overweight     Pain of meniscus of right knee       Surgical Hx:  has a past surgical history that includes surgical history of -  (1/31/05 & 8/05).    Medications:   Current Outpatient Medications:      aspirin 81 MG EC tablet, Take 81 mg by mouth daily, Disp: , Rfl:      diclofenac (VOLTAREN) 50 MG EC tablet, Take 1 tablet (50 mg) by mouth 3 times daily as needed for moderate pain Take with food., Disp: 30 tablet, Rfl: 5     multivitamin  with lutein (OCUVITE WITH LTEIN) CAPS per capsule, Take 1 capsule by mouth daily, Disp: 30 capsule, Rfl: 11     omeprazole (PRILOSEC) 20 MG DR capsule, Take 1 capsule (20 mg) by mouth daily, Disp: 90 capsule, Rfl: 1     simvastatin (ZOCOR) 20 MG tablet, Take 1 tablet (20 mg) by mouth At Bedtime, Disp: 90 tablet, Rfl: 3     triamterene-HCTZ (DYAZIDE) 37.5-25 MG capsule, Take 1 capsule by mouth every morning, Disp: 90 capsule, Rfl: 3     valACYclovir (VALTREX) 500 MG tablet, Take 1 tablet (500 mg) by mouth daily, Disp: 90 tablet, Rfl: 3    Allergies: No Known Allergies    Social Hx:  reports that he has never smoked. He has never used smokeless tobacco. He reports current alcohol use. He reports that he does not use drugs.    Family Hx: family history includes Diabetes in his brother; Diabetes (age of onset: 50) in his father.    REVIEW OF SYSTEMS: 10 point ROS neg other than the symptoms noted above in the HPI and PMH. Notables include  CONSTITUTIONAL:NEGATIVE for fever, chills, change in weight  INTEGUMENTARY/SKIN: NEGATIVE for worrisome  "rashes, moles or lesions  MUSCULOSKELETAL:See HPI above  NEURO: NEGATIVE for weakness, dizziness or paresthesias    PHYSICAL EXAM:  BP (!) 162/92   Resp 16   Ht 1.626 m (5' 4\")   Wt 67.6 kg (149 lb)   BMI 25.58 kg/m      GENERAL APPEARANCE: healthy, alert, no distress  SKIN: no suspicious lesions or rashes  NEURO: Normal strength and tone, mentation intact and speech normal  PSYCH:  mentation appears normal and affect normal, not anxious  RESPIRATORY: No increased work of breathing.  HANDS: no clubbing, nail pitting  LYMPH: no palpable popliteal lymphadenopathy.    BILATERAL LOWER EXTREMITIES:  Gait: normal  Alignment: valgus right, slight varus left.  No gross deformities or masses.  No Quad atrophy, strength normal.  Intact sensation deep peroneal nerve, superficial peroneal nerve, med/lat tibial nerve, sural nerve, saphenous nerve  Intact EHL, EDL, TA, FHL, GS, quadriceps hamstrings and hip flexors  Toes warm and well perfused, brisk capillary refill. Palpable 2+ dp pulses.  Bilateral calf soft and nttp or squeeze.  DTRs: achilles 2+, patella 2+.  Edema: trace    LEFT KNEE EXAM:    Skin: intact, no ecchymosis or erythema  ROM: full extension to 130 flexion  Tight hamstrings on straight leg raise.  Effusion: none  Tender: NTTP med/lat joint line, anterior or posterior knee  McMurrays: negative    MCL: stable, and non-painful at both 0 and 30 degrees knee flexion  Varus stress: stable, and non-painful at both 0 and 30 degrees knee flexion  Lachmans: neg, firm endpoint  Posterior Drawer stable  Patellofemoral joint:                Apprehension: negative              Crepitations: mild    RIGHT KNEE EXAM:    Skin: intact, no ecchymosis or erythema  ROM: full extension to 115 flexion, anterior discomfort.  Tight hamstrings on straight leg raise.  Effusion: moderate   Tender: medial, lateral joint line, pes  McMurrays: negative    MCL: stable, and non-painful at both 0 and 30 degrees knee flexion  Varus stress: " "stable, and non-painful at both 0 and 30 degrees knee flexion  Lachmans: neg, firm endpoint  Posterior Drawer stable  Patellofemoral joint:                Apprehension: negative              Crepitations: mild   Grind: positive.    X-RAY:  3 views right knee from 11/14/2019 were reviewed in clinic today. On my review, no obvious fractures or dislocations. moderate tricompartmental degenerative arthrosis, greatest in the lateral and patellofemoral compartments, where there is moderate joint space narrowing and marginal osteophytes. Moderate knee joint effusion.     MRI:  MRI right knee from 11/25/2019 was reviewed in clinic today.     1. Small to moderate size right knee joint effusion.  2. Osteoarthrosis of the right knee, most prominent in the lateral and  patellofemoral compartments with focal areas of high-grade cartilage  loss, as described above. Patellar subchondral cystic changes adjacent to area  of cartilage loss  3. Complex degenerative tearing of the anterior horn and body of the  lateral meniscus with meniscal fragments in the lateral femoral  recess.  4. The ACL, PCL, medial and lateral supporting structures, and medial  meniscus are intact.       ASSESSMENT/PLAN: Jose Gaines is a 59 year old male with chronic right knee pain, primary osteoarthritis, effusion.     * reviewed imaging studies with patient, showing arthritis. Arthritis is wearing of the cartilage due to longstanding \"wear and tear\" or can follow an injury to the joint.    Discussed typical symptoms of arthritic pain is pain aggravated with weight bearing activities, stiffness, relieved by sitting or rest. Swelling may be associated. It is common to have some grinding and popping in the knee with arthritis.    Workup for degenerative knee arthrosis and pain, typically starts with plain xrays of the knee. Xrays are usually all that is needed for evaluation of ongoing knee pain for arthritis, as they show the bony anatomy well and " underlying degenerative changes. An MRI is not usually needed in cases of degenerative knee pain and arthritis, as degenerative cartilage and meniscal changes seen on MRI are expected, given findings on xray. MRI may be indicated if the arthritis is mild and there are mechanical symptoms such as locking or catching in the knee, or an acute knee injury.    Discussed various treatments for degenerative arthrosis of the knee, including nonoperative and operative approaches. Nonoperative approaches, which are exhausted prior to operative treatment, include doing nothing and living with the pain as patient has been doing, activity modification (avoid aggravating activities), physical therapy and strengthening exercises, weight loss, anti-inflammatory medications, bracing, ambulatory aids (cane, walker) and injections. Once these have been tried and are deemed unsuccessful with a good effort, and the patient is an appropriate candidate, the next treatment would be knee arthroplasty or replacement. Depending on the location of the arthritis, knee replacement can be partial (one side of the knee affected by arthritis) or a total knee arthroplasty (all 3 compartments). The risks, perceived benefits and perioperative rehabilitation expectations of knee arthroplasty were discussed in detail. Also discussed that approximately 10% of patients that undergo knee arthroplasty are not happy following surgery and may have worse pain or no improvement in pain, contrary to their preoperative expectations.    At this time, nonoperative treatment will be pursued.    Non-surgical treatment for knee arthritis includes:    * rest, sitting  * Activity modification - avoid impact activities or activities that aggravate symptoms.  * NSAIDS (non-steroidal anti-inflammatory medications; e.g. Aleve, advil, motrin, ibuprofen) - regular use for inflammation ( twice daily or three times daily), with food, as long as no contra-indications Please  "discuss with primary care doctor if needed  * ice, 15-20 minutes at a time several times a day or as needed.  * Strengthening of quadriceps muscles  * Physical Therapy for strengthening, stretching and range of motion exercises of legs  * Tylenol as needed for pain, consider Tylenol arthritis or similar  * Weight loss: Weight loss:  Body mass index is 25.58 kg/m .. weight loss benefits, not only for the current pain symptoms, but also overall health. Recommend a good diet plan that works for the patient, with the assistance of a dietician or primary care doctor as needed. Also, a good, low-impact exercise program for at least 20 minutes per day, 3 times per week, such as exercise bike, elliptical , or pool.  * Exercise: low impact such as stationary bike, elliptical, pool.  * Injections: cortisone versus viscosupplementation (hyaluronic acid, \"rooster comb\", \"gel shots\"); risks and perceived benefits discussed today. Patient elects  to proceed.  * Bracing: bracing the knee may offer some relief of symptoms when worn and provide some stability.  * over the counter supplements such as glucosamine and chondroitin sulfate may help with joint pain.  * topical ointments may help as well    * return to clinic as needed.      [place]ak to follow up with Primary Care provider regarding elevated blood pressure.      Chato Nevarez M.D., M.S.  Dept. of Orthopaedic Surgery  [facility]    Large Joint Injection/Arthocentesis: R knee joint  Date/Time: 12/5/2019 3:53 PM  Performed by: Sushil Mandujano PA  Authorized by: Chato Nevarez MD     Indications:  Pain and osteoarthritis  Needle Size:  18 G  Guidance: landmark guided    Approach:  Superolateral  Location:  Knee      Medications:  3 mL bupivacaine 0.25 %; 4 mL lidocaine 1 %; 80 mg methylPREDNISolone 80 MG/ML  Aspirate amount (mL):  30  Aspirate:  Yellow  Outcome:  Tolerated well, no immediate complications  Procedure discussed: discussed risks, " benefits, and alternatives    Consent Given by:  Patient  Prep: patient was prepped and draped in usual sterile fashion            Again, thank you for allowing me to participate in the care of your patient.        Sincerely,        Chato Nevarez MD

## 2019-12-05 NOTE — PROGRESS NOTES
"CHIEF COMPLAINT:   Chief Complaint   Patient presents with     Right Knee - Pain     Right knee pain. Onset: 2 months, much worse the last 4 weeks. Just ibuprofen for pain control. Had a cyst excised near tibia tubercle in ~2005, got infected and had I and D. Otherwise NKI.  on feet all day.   .    Jose Gaines is seen today in the [facility] for evaluation of right knee pain at the request of Ky Quezada      HISTORY OF PRESENT ILLNESS    Jose Gaines is a 59 year old male seen for evaluation of ongoing right knee pain with no known injury.   Pain has been present for 4-5 months,maybe longer, history is unclear, but worse the past 4 weeks. Locates pain in front and \"inside\" the knee. . Aggravated with weight bearing, walking but worse with stairs, sit to stand. Has swelling. Ok at rest, occasional night pain.  He works as a  on her feet all day. Treatment with ibuprofen, pain medications, knee sleeve.    History of pretibial bursal excision near the tibial tubercle ~2005 which became infected with resultant I+D, iv antibiotics and PICC line.    Present symptoms: pain anteriorly and diffuse, pain shooting, dull/achy , moderate pain, severe pain, mild swelling.    Pain severity: 8/10  Frequency of symptoms: are constant  Exacerbating Factors: weight bearing, stairs, [id number]  Relieving Factors: rest, sitting  Night Pain: Yes  Pain while at rest: No   Numbness or tingling: No   Patient has tried:     NSAIDS: Yes      Physical Therapy: No      Activity modification: No      Bracing: sleeve      Injections: No      Ice: No      Assistive device:  No    Orthopaedic PMH: right knee pretibial/bursal excision with postop infection, I+D, 2005 (?[facility]).    Other PMH:  has a past medical history of Allergic rhinitis and ED (erectile dysfunction).  Patient Active Problem List   Diagnosis     Seasonal allergic rhinitis     " "CARDIOVASCULAR SCREENING; LDL GOAL LESS THAN 130     Chronic hepatitis B (H)     Essential hypertension     Hyperlipidemia     Internal hemorrhoid     Language barrier     Oral ulcer     Overweight     Pain of meniscus of right knee       Surgical Hx:  has a past surgical history that includes surgical history of -  (1/31/05 & 8/05).    Medications:   Current Outpatient Medications:      aspirin 81 MG EC tablet, Take 81 mg by mouth daily, Disp: , Rfl:      diclofenac (VOLTAREN) 50 MG EC tablet, Take 1 tablet (50 mg) by mouth 3 times daily as needed for moderate pain Take with food., Disp: 30 tablet, Rfl: 5     multivitamin  with lutein (OCUVITE WITH LTEIN) CAPS per capsule, Take 1 capsule by mouth daily, Disp: 30 capsule, Rfl: 11     omeprazole (PRILOSEC) 20 MG DR capsule, Take 1 capsule (20 mg) by mouth daily, Disp: 90 capsule, Rfl: 1     simvastatin (ZOCOR) 20 MG tablet, Take 1 tablet (20 mg) by mouth At Bedtime, Disp: 90 tablet, Rfl: 3     triamterene-HCTZ (DYAZIDE) 37.5-25 MG capsule, Take 1 capsule by mouth every morning, Disp: 90 capsule, Rfl: 3     valACYclovir (VALTREX) 500 MG tablet, Take 1 tablet (500 mg) by mouth daily, Disp: 90 tablet, Rfl: 3    Allergies: No Known Allergies    Social Hx:  reports that he has never smoked. He has never used smokeless tobacco. He reports current alcohol use. He reports that he does not use drugs.    Family Hx: family history includes Diabetes in his brother; Diabetes (age of onset: 50) in his father.    REVIEW OF SYSTEMS: 10 point ROS neg other than the symptoms noted above in the HPI and PMH. Notables include  CONSTITUTIONAL:NEGATIVE for fever, chills, change in weight  INTEGUMENTARY/SKIN: NEGATIVE for worrisome rashes, moles or lesions  MUSCULOSKELETAL:See HPI above  NEURO: NEGATIVE for weakness, dizziness or paresthesias    PHYSICAL EXAM:  BP (!) 162/92   Resp 16   Ht 1.626 m (5' 4\")   Wt 67.6 kg (149 lb)   BMI 25.58 kg/m     GENERAL APPEARANCE: healthy, alert, no " distress  SKIN: no suspicious lesions or rashes  NEURO: Normal strength and tone, mentation intact and speech normal  PSYCH:  mentation appears normal and affect normal, not anxious  RESPIRATORY: No increased work of breathing.  HANDS: no clubbing, nail pitting  LYMPH: no palpable popliteal lymphadenopathy.    BILATERAL LOWER EXTREMITIES:  Gait: normal  Alignment: valgus right, slight varus left.  No gross deformities or masses.  No Quad atrophy, strength normal.  Intact sensation deep peroneal nerve, superficial peroneal nerve, med/lat tibial nerve, sural nerve, saphenous nerve  Intact EHL, EDL, TA, FHL, GS, quadriceps hamstrings and hip flexors  Toes warm and well perfused, brisk capillary refill. Palpable 2+ dp pulses.  Bilateral calf soft and nttp or squeeze.  DTRs: achilles 2+, patella 2+.  Edema: trace    LEFT KNEE EXAM:    Skin: intact, no ecchymosis or erythema  ROM: full extension to 130 flexion  Tight hamstrings on straight leg raise.  Effusion: none  Tender: NTTP med/lat joint line, anterior or posterior knee  McMurrays: negative    MCL: stable, and non-painful at both 0 and 30 degrees knee flexion  Varus stress: stable, and non-painful at both 0 and 30 degrees knee flexion  Lachmans: neg, firm endpoint  Posterior Drawer stable  Patellofemoral joint:                Apprehension: negative              Crepitations: mild    RIGHT KNEE EXAM:    Skin: intact, no ecchymosis or erythema  ROM: full extension to 115 flexion, anterior discomfort.  Tight hamstrings on straight leg raise.  Effusion: moderate   Tender: medial, lateral joint line, pes  McMurrays: negative    MCL: stable, and non-painful at both 0 and 30 degrees knee flexion  Varus stress: stable, and non-painful at both 0 and 30 degrees knee flexion  Lachmans: neg, firm endpoint  Posterior Drawer stable  Patellofemoral joint:                Apprehension: negative              Crepitations: mild   Grind: positive.    X-RAY:  3 views right knee from  "11/14/2019 were reviewed in clinic today. On my review, no obvious fractures or dislocations. moderate tricompartmental degenerative arthrosis, greatest in the lateral and patellofemoral compartments, where there is moderate joint space narrowing and marginal osteophytes. Moderate knee joint effusion.     MRI:  MRI right knee from 11/25/2019 was reviewed in clinic today.     1. Small to moderate size right knee joint effusion.  2. Osteoarthrosis of the right knee, most prominent in the lateral and  patellofemoral compartments with focal areas of high-grade cartilage  loss, as described above. Patellar subchondral cystic changes adjacent to area  of cartilage loss  3. Complex degenerative tearing of the anterior horn and body of the  lateral meniscus with meniscal fragments in the lateral femoral  recess.  4. The ACL, PCL, medial and lateral supporting structures, and medial  meniscus are intact.       ASSESSMENT/PLAN: Jose Gaines is a 59 year old male with chronic right knee pain, primary osteoarthritis, effusion.     * reviewed imaging studies with patient, showing arthritis. Arthritis is wearing of the cartilage due to longstanding \"wear and tear\" or can follow an injury to the joint.    Discussed typical symptoms of arthritic pain is pain aggravated with weight bearing activities, stiffness, relieved by sitting or rest. Swelling may be associated. It is common to have some grinding and popping in the knee with arthritis.    Workup for degenerative knee arthrosis and pain, typically starts with plain xrays of the knee. Xrays are usually all that is needed for evaluation of ongoing knee pain for arthritis, as they show the bony anatomy well and underlying degenerative changes. An MRI is not usually needed in cases of degenerative knee pain and arthritis, as degenerative cartilage and meniscal changes seen on MRI are expected, given findings on xray. MRI may be indicated if the arthritis is mild and there are " mechanical symptoms such as locking or catching in the knee, or an acute knee injury.    Discussed various treatments for degenerative arthrosis of the knee, including nonoperative and operative approaches. Nonoperative approaches, which are exhausted prior to operative treatment, include doing nothing and living with the pain as patient has been doing, activity modification (avoid aggravating activities), physical therapy and strengthening exercises, weight loss, anti-inflammatory medications, bracing, ambulatory aids (cane, walker) and injections. Once these have been tried and are deemed unsuccessful with a good effort, and the patient is an appropriate candidate, the next treatment would be knee arthroplasty or replacement. Depending on the location of the arthritis, knee replacement can be partial (one side of the knee affected by arthritis) or a total knee arthroplasty (all 3 compartments). The risks, perceived benefits and perioperative rehabilitation expectations of knee arthroplasty were discussed in detail. Also discussed that approximately 10% of patients that undergo knee arthroplasty are not happy following surgery and may have worse pain or no improvement in pain, contrary to their preoperative expectations.    At this time, nonoperative treatment will be pursued.    Non-surgical treatment for knee arthritis includes:    * rest, sitting  * Activity modification - avoid impact activities or activities that aggravate symptoms.  * NSAIDS (non-steroidal anti-inflammatory medications; e.g. Aleve, advil, motrin, ibuprofen) - regular use for inflammation ( twice daily or three times daily), with food, as long as no contra-indications Please discuss with primary care doctor if needed  * ice, 15-20 minutes at a time several times a day or as needed.  * Strengthening of quadriceps muscles  * Physical Therapy for strengthening, stretching and range of motion exercises of legs  * Tylenol as needed for pain,  "consider Tylenol arthritis or similar  * Weight loss: Weight loss:  Body mass index is 25.58 kg/m .. weight loss benefits, not only for the current pain symptoms, but also overall health. Recommend a good diet plan that works for the patient, with the assistance of a dietician or primary care doctor as needed. Also, a good, low-impact exercise program for at least 20 minutes per day, 3 times per week, such as exercise bike, elliptical , or pool.  * Exercise: low impact such as stationary bike, elliptical, pool.  * Injections: cortisone versus viscosupplementation (hyaluronic acid, \"rooster comb\", \"gel shots\"); risks and perceived benefits discussed today. Patient elects  to proceed.  * Bracing: bracing the knee may offer some relief of symptoms when worn and provide some stability.  * over the counter supplements such as glucosamine and chondroitin sulfate may help with joint pain.  * topical ointments may help as well    * return to clinic as needed.      [place] to follow up with Primary Care provider regarding elevated blood pressure.      Chato Nevarez M.D., M.S.  Dept. of Orthopaedic Surgery  [facility]    Large Joint Injection/Arthocentesis: R knee joint  Date/Time: 12/5/2019 3:53 PM  Performed by: Sushil Mandujano PA  Authorized by: Chato Nevarez MD     Indications:  Pain and osteoarthritis  Needle Size:  18 G  Guidance: landmark guided    Approach:  Superolateral  Location:  Knee      Medications:  3 mL bupivacaine 0.25 %; 4 mL lidocaine 1 %; 80 mg methylPREDNISolone 80 MG/ML  Aspirate amount (mL):  30  Aspirate:  Yellow  Outcome:  Tolerated well, no immediate complications  Procedure discussed: discussed risks, benefits, and alternatives    Consent Given by:  Patient  Prep: patient was prepped and draped in usual sterile fashion          "

## 2019-12-06 ENCOUNTER — NURSE TRIAGE (OUTPATIENT)
Dept: NURSING | Facility: CLINIC | Age: 59
End: 2019-12-06

## 2019-12-06 NOTE — TELEPHONE ENCOUNTER
Clinic Action Needed: Please send letter to Pt's  Email   Today .  [email or web address] so Pt can show this to his supervisor at work , and explain Pt had knee injection for arthritis pain on 12/5/19 and needs time to rest , so sitting will help heal up from injection . Any other recommended for pain management  For this knee arthritis pain .   Reason for Call: Need note for work from Dr Nevarez from appt 12/5/19  And  Pt wants to know if he can use Advil while taking Aspirin  .      Used [employer] Interpretor used # [id number]  .   Presenting problem :  Pt calls . Seen at [facility]  regina Millard for R knee  Steroid injection  On 12/5/19 for arthritis  , using Cane for walking still  but still thinks he is feeling worse today, but states Pt is improved from last night , and activity seem the same , called in sick today .     Reviewed Plan  With Pt , at  [facility] visit and  FNA advised to return to Clinic for more Pain Rx if worse or symptoms not improving & getting Tylenol Arthritis according to directions on container and ice and rest and called in sick today and over the weekend thinks he can sit for work . Also  FNA explained to pt that steroid injection takes time up to days to work and initially can hurt more due to injection fluid taking up space is already sore joint , but if he get no improvement after a few days to contact or return to be seen. Guideline : used plan on appt visit by provider .   Disposition and recommendations : Ice , rest / siting , OTC per bottle instructions and return if no improvement or worse per plan on appt yesterday.   Caller verbalizes understanding and denies further questions and will call back if further symptoms to triage or questions  . Kanwal Toussaint RN  - [facility] Nurse Advisor   Seen yesterday ,

## 2019-12-06 NOTE — TELEPHONE ENCOUNTER
Additional Information    Negative: Nursing judgment    Negative: Nursing judgment    Nursing judgment    Protocols used: NO PROTOCOL AVAILABLE - INFORMATION ONLY-A-[place]

## 2020-01-27 ENCOUNTER — ANCILLARY PROCEDURE (OUTPATIENT)
Dept: GENERAL RADIOLOGY | Facility: CLINIC | Age: 60
End: 2020-01-27
Attending: INTERNAL MEDICINE
Payer: COMMERCIAL

## 2020-01-27 ENCOUNTER — OFFICE VISIT (OUTPATIENT)
Dept: FAMILY MEDICINE | Facility: CLINIC | Age: 60
End: 2020-01-27
Payer: COMMERCIAL

## 2020-01-27 VITALS
RESPIRATION RATE: 18 BRPM | HEART RATE: 80 BPM | BODY MASS INDEX: 26.54 KG/M2 | WEIGHT: 149.8 LBS | TEMPERATURE: 98.4 F | DIASTOLIC BLOOD PRESSURE: 80 MMHG | OXYGEN SATURATION: 96 % | SYSTOLIC BLOOD PRESSURE: 135 MMHG | HEIGHT: 63 IN

## 2020-01-27 DIAGNOSIS — B18.1 VIRAL HEPATITIS B CHRONIC (H): ICD-10-CM

## 2020-01-27 DIAGNOSIS — H10.33 ACUTE BACTERIAL CONJUNCTIVITIS OF BOTH EYES: ICD-10-CM

## 2020-01-27 DIAGNOSIS — Z91.89 AT RISK FOR INFECTIOUS DISEASE DUE TO RECENT FOREIGN TRAVEL: ICD-10-CM

## 2020-01-27 DIAGNOSIS — J02.0 STREPTOCOCCAL SORE THROAT: Primary | ICD-10-CM

## 2020-01-27 DIAGNOSIS — R05.8 UPPER AIRWAY COUGH SYNDROME: ICD-10-CM

## 2020-01-27 DIAGNOSIS — R68.89 FLU-LIKE SYMPTOMS: ICD-10-CM

## 2020-01-27 LAB
DEPRECATED S PYO AG THROAT QL EIA: ABNORMAL
FLUAV+FLUBV AG SPEC QL: NEGATIVE
FLUAV+FLUBV AG SPEC QL: NEGATIVE
SPECIMEN SOURCE: ABNORMAL
SPECIMEN SOURCE: NORMAL

## 2020-01-27 PROCEDURE — 87804 INFLUENZA ASSAY W/OPTIC: CPT | Mod: 59 | Performed by: INTERNAL MEDICINE

## 2020-01-27 PROCEDURE — 71046 X-RAY EXAM CHEST 2 VIEWS: CPT

## 2020-01-27 PROCEDURE — 99214 OFFICE O/P EST MOD 30 MIN: CPT | Performed by: INTERNAL MEDICINE

## 2020-01-27 PROCEDURE — 70220 X-RAY EXAM OF SINUSES: CPT

## 2020-01-27 PROCEDURE — 87880 STREP A ASSAY W/OPTIC: CPT | Performed by: INTERNAL MEDICINE

## 2020-01-27 RX ORDER — GENTAMICIN SULFATE 3 MG/ML
2 SOLUTION/ DROPS OPHTHALMIC 4 TIMES DAILY
Qty: 5 ML | Refills: 0 | Status: SHIPPED | OUTPATIENT
Start: 2020-01-27 | End: 2020-04-27

## 2020-01-27 ASSESSMENT — MIFFLIN-ST. JEOR: SCORE: 1389.62

## 2020-01-27 ASSESSMENT — PAIN SCALES - GENERAL: PAINLEVEL: NO PAIN (0)

## 2020-01-27 NOTE — LETTER
REPORT OF WORK ABILITY    [address] [address]  [phone]      PATIENT DATA    Employee Name: Jose Gaines      : 1960     #: [id number]    Today's date: 2020  Date of first visit: 2020    PROVIDER EVALUATION: Please fill in as needed.  Please give copy to employee for employer.    1. Diagnosis: Acute streptococcal pharyngitis     2. Treatment: Rest and pharmacotherapy.  3. Medication: Augmentin  4. No work from 2020 to 2020.  5. Return to work date: 2020   ** WITH RESTRICTIONS? No restrictions      RESTRICTIONS: Unlimited unless listed.  Restrictions apply to home and leisure also.  If work restrictions is not available, the employee is totally disabled.    Maximum Medical Improvement (Date): Tentatively, 2020  Any Permanent Partial Disability? 0%    Provider comments: Mr. Jose Gaines has acute streptococcal pharyngitis, confirmed by positive rapid strep screen test today. He requires to be off work for 2 days to recuperate.    Medical Examiner:   Ky Stone MD  [place] or [place]: [id number]    Next appointment: As needed    CC: Employer, Managed Care Plan/Payor, Patient

## 2020-01-27 NOTE — PROGRESS NOTES
Subjective     Jose Gaines is a 59 year old male who presents to clinic today for the following health issues:    HPI   Acute Illness   Chief complaint: sore throat  Onset: Friday    Fever: no    Chills/Sweats: YES    Headache (location?): no    Sinus Pressure:YES    Conjunctivitis:  no    Ear Pain: no    Rhinorrhea: YES    Congestion: YES    Sore Throat: YES     Cough: YES    Wheeze: YES    Decreased Appetite: no    Nausea: no    Vomiting: no    Diarrhea:  no    Dysuria/Freq.: no    Fatigue/Achiness: no    Sick/Strep Exposure: no     Therapies Tried and outcome: vicks flu therapy (not therapy). Patient returned from a trip from [facility]. He denies any recent travel in [place] nor any contact with any [place] citizen from [facility].      Patient Active Problem List   Diagnosis     Seasonal allergic rhinitis     CARDIOVASCULAR SCREENING; LDL GOAL LESS THAN 130     Chronic hepatitis B (H)     Essential hypertension     Hyperlipidemia     Internal hemorrhoid     Language barrier     Oral ulcer     Overweight     Pain of meniscus of right knee     Past Surgical History:   Procedure Laterality Date     SURGICAL HISTORY OF -   1/31/05 & 8/05    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       Social History     Tobacco Use     Smoking status: Never Smoker     Smokeless tobacco: Never Used   Substance Use Topics     Alcohol use: Yes     Family History   Problem Relation Age of Onset     Diabetes Father 50     Diabetes Brother         unknown age of onset         No Known Allergies  Recent Labs   Lab Test 03/04/19  0742 02/28/18  1148   LDL 89 118*   HDL 66 93   TRIG 97 74   ALT 46  --    CR 0.76 0.76   GFRESTIMATED >90 >90   GFRESTBLACK >90 >90   POTASSIUM 3.3* 3.7      BP Readings from Last 3 Encounters:   01/27/20 135/80   12/05/19 (!) 162/92   11/14/19 (!) 147/83    Wt Readings from Last 3 Encounters:   01/27/20 67.9 kg (149 lb 12.8 oz)   12/05/19 67.6 kg (149 lb)   11/14/19 66.7 kg (147 lb)                      Reviewed and updated as  "needed this visit by Provider         Review of Systems   ROS COMP: CONSTITUTIONAL: NEGATIVE for fever, chills, change in weight  INTEGUMENTARY/SKIN: NEGATIVE for worrisome rashes, moles or lesions  EYES: POSITIVE for discharge bilateral and redness bilateral and NEGATIVE for blurred vision  and eye pain   ENT/MOUTH: NEGATIVE for epistaxis and vertigo  RESP: NEGATIVE for significant cough or SOB  CV: NEGATIVE for chest pain, palpitations or peripheral edema  GI: NEGATIVE for nausea, abdominal pain, heartburn, or change in bowel habits  : NEGATIVE for frequency, dysuria, or hematuria  MUSCULOSKELETAL: NEGATIVE for significant arthralgias or myalgia  NEURO: NEGATIVE for weakness, dizziness or paresthesias  ENDOCRINE: NEGATIVE for temperature intolerance, skin/hair changes  HEME: NEGATIVE for bleeding problems  PSYCHIATRIC: NEGATIVE for changes in mood or affect      Objective    /80   Pulse 80   Temp 98.4  F (36.9  C) (Oral)   Resp 18   Ht 1.6 m (5' 3\")   Wt 67.9 kg (149 lb 12.8 oz)   SpO2 96%   BMI 26.54 kg/m     Physical Exam   GENERAL: alert and fatigued  EYES: Eyes grossly normal to inspection and conjunctivae and sclerae normal  HENT: normal cephalic/atraumatic and oral mucous membranes moist  NECK: no adenopathy  RESP: lungs clear to auscultation - no rales, rhonchi or wheezes  CV: regular rates and rhythm, normal S1 S2, no S3 or S4, no murmur, click or rub, peripheral pulses strong and no peripheral edema  MS: no gross musculoskeletal defects noted, no edema  SKIN: no suspicious lesions or rashes  NEURO: Normal strength and tone, mentation intact and speech normal  PSYCH: mentation appears normal and affect normal/bright    Diagnostic Test Results:  Results for orders placed or performed in visit on 01/27/20   XR Chest 2 Views     Status: None    Narrative    CHEST TWO VIEWS  1/27/2020 12:17 PM     HISTORY: Flu-like symptoms. At risk for infectious disease due to  recent foreign " travel.    COMPARISON: None.      Impression    IMPRESSION: PA and lateral views of the chest. Lungs are clear. Heart  is normal in size. No effusions are evident. No pneumothorax.    GREGORY SOL MD   XR Sinus Complete G/E 3 Views     Status: None    Narrative    SINUS COMPLETE THREE OR MORE VIEWS January 27, 2020 12:17 PM     HISTORY: Upper airway cough syndrome.    COMPARISON: None.       Impression    IMPRESSION: Paranasal sinuses appear relatively clear by plain film  technique. No obvious air-fluid layer in the sinuses.     LIEN MCFADDEN MD   Strep, Rapid Screen     Status: Abnormal   Result Value Ref Range    Specimen Description Throat     Rapid Strep A Screen (A)      POSITIVE: Group A Streptococcal antigen detected by immunoassay.   Influenza A/B antigen     Status: None   Result Value Ref Range    Influenza A/B Agn Specimen Nasal     Influenza A Negative NEG^Negative    Influenza B Negative NEG^Negative           Assessment & Plan     1. Streptococcal sore throat  Positive test, which explains his multiple symptoms.  - Strep, Rapid Screen  - amoxicillin-clavulanate (AUGMENTIN) 875-125 MG tablet; Take 1 tablet by mouth 2 times daily for 7 days  Dispense: 14 tablet; Refill: 1    2. Acute bacterial conjunctivitis of both eyes  - gentamicin (GARAMYCIN) 0.3 % ophthalmic solution; Place 2 drops into both eyes 4 times daily  Dispense: 5 mL; Refill: 0    3. Flu-like symptoms  Negative test. Test justified due to recent overseas trip.  - Influenza A/B antigen  - XR Chest 2 Views    4. At risk for infectious disease due to recent foreign travel  Chest x-rays show no pneumonia and patient is not in respiratory distress.  - XR Chest 2 Views    5. Upper airway cough syndrome  No evidence of sinusitis.  - XR Sinus Complete G/E 3 Views    6. Viral hepatitis B chronic (H)  Liver panel last 7/25/2019 is normal. HBV DNA quantitative level last 10/30/2018 is only 161.       FUTURE APPOINTMENTS:       - Follow-up visit in 2  weeks or as needed.    Return in about 6 months (around 7/27/2020).    Ky Stone MD  [facility]

## 2020-03-03 DIAGNOSIS — E78.5 HYPERLIPIDEMIA LDL GOAL <100: ICD-10-CM

## 2020-03-03 NOTE — TELEPHONE ENCOUNTER
"Requested Prescriptions   Pending Prescriptions Disp Refills     simvastatin (ZOCOR) 20 MG tablet [Pharmacy Med Name: SIMVASTATIN 20MG TABLETS]  Last Written Prescription Date:  03/04/19  Last Fill Quantity: 90,  # refills: 3   Last Office Visit with [facility], [facility] or [facility] prescribing provider:  01/27/2020-Vocal   Future Office Visit:    90 tablet 3     Sig: TAKE 1 TABLET BY MOUTH EVERY NIGHT AT BEDTIME       Statins Protocol Passed - 3/3/2020  4:04 AM        Passed - LDL on file in past 12 months     Recent Labs   Lab Test 03/04/19  0742   LDL 89             Passed - No abnormal creatine kinase in past 12 months     No lab results found.             Passed - Recent (12 mo) or future (30 days) visit within the authorizing provider's specialty     Patient has had an office visit with the authorizing provider or a provider within the authorizing providers department within the previous 12 mos or has a future within next 30 days. See \"Patient Info\" tab in inbasket, or \"Choose Columns\" in Meds & Orders section of the refill encounter.              Passed - Medication is active on med list        Passed - Patient is age 18 or older          "

## 2020-03-05 RX ORDER — SIMVASTATIN 20 MG
20 TABLET ORAL AT BEDTIME
Qty: 90 TABLET | Refills: 0 | Status: SHIPPED | OUTPATIENT
Start: 2020-03-05 | End: 2020-04-30

## 2020-03-05 NOTE — TELEPHONE ENCOUNTER
Prescription approved per [facility] Refill Protocol.  Patient had LDL done with outside orders on 7/25/19, see Laboratory tab for details.    Marilin Buchanan RN  [facility]

## 2020-03-13 ENCOUNTER — OFFICE VISIT (OUTPATIENT)
Dept: URGENT CARE | Facility: URGENT CARE | Age: 60
End: 2020-03-13
Payer: COMMERCIAL

## 2020-03-13 VITALS
TEMPERATURE: 98.2 F | WEIGHT: 150.6 LBS | HEART RATE: 82 BPM | OXYGEN SATURATION: 95 % | DIASTOLIC BLOOD PRESSURE: 88 MMHG | SYSTOLIC BLOOD PRESSURE: 167 MMHG | BODY MASS INDEX: 26.68 KG/M2 | RESPIRATION RATE: 16 BRPM

## 2020-03-13 DIAGNOSIS — H10.11 ALLERGIC CONJUNCTIVITIS, RIGHT: Primary | ICD-10-CM

## 2020-03-13 PROCEDURE — 99213 OFFICE O/P EST LOW 20 MIN: CPT | Performed by: NURSE PRACTITIONER

## 2020-03-13 RX ORDER — CETIRIZINE HYDROCHLORIDE 10 MG/1
10 TABLET ORAL EVERY EVENING
Qty: 14 TABLET | Refills: 0 | Status: SHIPPED | OUTPATIENT
Start: 2020-03-13 | End: 2020-06-04

## 2020-03-13 RX ORDER — OLOPATADINE HYDROCHLORIDE 1 MG/ML
1 SOLUTION/ DROPS OPHTHALMIC 2 TIMES DAILY
Qty: 1 ML | Refills: 0 | Status: SHIPPED | OUTPATIENT
Start: 2020-03-13 | End: 2020-06-04

## 2020-03-13 ASSESSMENT — ENCOUNTER SYMPTOMS
FEVER: 0
EYE REDNESS: 1
VOMITING: 0
DIARRHEA: 0
EYE ITCHING: 1
COUGH: 0
CHILLS: 0
RHINORRHEA: 0
SORE THROAT: 0
SHORTNESS OF BREATH: 0
DIAPHORESIS: 0
NAUSEA: 0

## 2020-03-14 NOTE — PROGRESS NOTES
SUBJECTIVE:   Jose Gaines is a 59 year old male presenting with a chief complaint of   Chief Complaint   Patient presents with     Eye Problem     irritation of right eye start today        He is an established patient of [facility].    Eye Problem    Onset of symptoms was 1 hour(s) ago.   Location: right eye   Course of illness is worsening.    Severity moderate  Current and Associated symptoms: redness, itching  Treatment measures tried include none  Context: none    Review of Systems   Constitutional: Negative for chills, diaphoresis and fever.   HENT: Negative for congestion, ear pain, rhinorrhea and sore throat.    Eyes: Positive for redness and itching.   Respiratory: Negative for cough and shortness of breath.    Gastrointestinal: Negative for diarrhea, nausea and vomiting.   All other systems reviewed and are negative.      Past Medical History:   Diagnosis Date     Allergic rhinitis      ED (erectile dysfunction)      Family History   Problem Relation Age of Onset     Diabetes Father 50     Diabetes Brother         unknown age of onset     Current Outpatient Medications   Medication Sig Dispense Refill     cetirizine (ZYRTEC) 10 MG tablet Take 1 tablet (10 mg) by mouth every evening for 14 days 14 tablet 0     gentamicin (GARAMYCIN) 0.3 % ophthalmic solution Place 2 drops into both eyes 4 times daily 5 mL 0     multivitamin  with lutein (OCUVITE WITH LTEIN) CAPS per capsule Take 1 capsule by mouth daily 30 capsule 11     olopatadine (PATANOL) 0.1 % ophthalmic solution Place 1 drop into the right eye 2 times daily for 7 days 1 mL 0     simvastatin (ZOCOR) 20 MG tablet Take 1 tablet (20 mg) by mouth At Bedtime ++NEED FASTING LABS FOR ADDITIONAL REFILLS++ 90 tablet 0     amoxicillin-clavulanate (AUGMENTIN) 875-125 MG tablet Take 1 tablet by mouth 2 times daily (Patient not taking: Reported on 3/13/2020) 14 tablet 1     aspirin 81 MG EC tablet Take 81 mg by mouth daily       diclofenac (VOLTAREN) 50 MG EC  tablet Take 1 tablet (50 mg) by mouth 3 times daily as needed for moderate pain Take with food. (Patient not taking: Reported on 3/13/2020) 30 tablet 5     hydrochlorothiazide (HYDRODIURIL) 25 MG tablet TAKE ONE TABLET BY MOUTH DAILY (Patient not taking: Reported on 3/13/2020) 90 tablet 1     omeprazole (PRILOSEC) 20 MG DR capsule Take 1 capsule (20 mg) by mouth daily (Patient not taking: Reported on 3/13/2020) 90 capsule 1     triamterene-HCTZ (DYAZIDE) 37.5-25 MG capsule Take 1 capsule by mouth every morning (Patient not taking: Reported on 3/13/2020) 90 capsule 3     valACYclovir (VALTREX) 500 MG tablet Take 1 tablet (500 mg) by mouth daily (Patient not taking: Reported on 3/13/2020) 90 tablet 3     Social History     Tobacco Use     Smoking status: Never Smoker     Smokeless tobacco: Never Used   Substance Use Topics     Alcohol use: Yes       OBJECTIVE  BP (!) 167/88 (BP Location: Left arm, Patient Position: Sitting, Cuff Size: Adult Regular)   Pulse 82   Temp 98.2  F (36.8  C) (Oral)   Resp 16   Wt 68.3 kg (150 lb 9.6 oz)   SpO2 95%   BMI 26.68 kg/m      Physical Exam  Vitals signs and nursing note reviewed.   Constitutional:       General: He is not in acute distress.     Appearance: He is well-developed. He is not diaphoretic.   HENT:      Head: Normocephalic and atraumatic.      Right Ear: Tympanic membrane and external ear normal.      Left Ear: Tympanic membrane and external ear normal.   Eyes:      General: Lids are normal. Lids are everted, no foreign bodies appreciated.         Right eye: Discharge present.      Conjunctiva/sclera:      Right eye: Right conjunctiva is injected.      Pupils: Pupils are equal, round, and reactive to light.   Neck:      Musculoskeletal: Normal range of motion and neck supple.   Pulmonary:      Effort: Pulmonary effort is normal. No respiratory distress.      Breath sounds: Normal breath sounds.   Lymphadenopathy:      Cervical: No cervical adenopathy.   Skin:      General: Skin is warm and dry.   Neurological:      Mental Status: He is alert.      Cranial Nerves: No cranial nerve deficit.         Labs:  No results found for this or any previous visit (from the past 24 hour(s)).      ASSESSMENT:      ICD-10-CM    1. Allergic conjunctivitis, right  H10.11 olopatadine (PATANOL) 0.1 % ophthalmic solution     cetirizine (ZYRTEC) 10 MG tablet        Medical Decision Making:    Differential Diagnosis:  Eye Problem: Bacterial conjunctivitis  Viral conjunctivitis  Allergic conjunctivitis  Foreign body    Serious Comorbid Conditions:  Adult:  None    PLAN:  Patient educational/instructional material provided including reasons for follow-up    The patient indicates understanding of these issues and agrees with the plan.

## 2020-03-14 NOTE — PATIENT INSTRUCTIONS
Patient Education     Conjunctivitis, Allergic    Conjunctivitis is an irritation of a thin membrane in the eye. This membrane is called the conjunctiva. It covers the white of the eye and the inside of the eyelid. The condition is often called pink eye or red eye because the eye looks pink or red. The eye can also be swollen. A thick fluid may leak from the eyelid. The eye may itch and burn, and feel gritty or scratchy.  Allergic conjunctivitis is caused by an allergen. Allergens are substances that cause the body to react with certain symptoms. Allergens that cause eye irritation include things such as house dust or pollen in the air. This can occur seasonally, most often in the spring.  Home care    Eye drops may be prescribed to reduce itching and redness. Use these as directed. Otherwise, over-the-counter decongestant eye drops may be used.    Apply a cool compress (towel soaked in cool water) to the affected eye 3 to 4 times a day to reduce swelling and itching.    It is common to have mucus drainage during the night, causing the eyelids to become crusted by morning. Use a warm, wet cloth to wipe this away. You may also use saline irrigating solution or artificial tears to rinse away mucus in the eye. Don't patch the eye.    You may use acetaminophen or ibuprofen to control pain, unless another medicine was prescribed. (Note: If you have chronic liver or kidney disease, or if you have ever had a stomach ulcer or gastrointestinal bleeding, talk with your healthcare provider before using these medicines.)    Don't wear contact lenses until your eyes have healed and all symptoms are gone.  Follow-up care  Follow up with your healthcare provider, or as advised.  When to seek medical advice  Call your healthcare provider right away if any of these occur:    Increased eyelid swelling    New or worsening drainage from the eye    Increasing redness around the eye    Facial swelling  Date Last Reviewed: 7/1/2017     [id number] [employer]. [address] [place] [address], [place] [address]. All rights reserved. This information is not intended as a substitute for professional medical care. Always follow your healthcare professional's instructions.

## 2020-04-27 ENCOUNTER — VIRTUAL VISIT (OUTPATIENT)
Dept: FAMILY MEDICINE | Facility: CLINIC | Age: 60
End: 2020-04-27
Payer: COMMERCIAL

## 2020-04-27 DIAGNOSIS — E78.5 HYPERLIPIDEMIA LDL GOAL <100: ICD-10-CM

## 2020-04-27 DIAGNOSIS — R19.7 DIARRHEA, UNSPECIFIED TYPE: Primary | ICD-10-CM

## 2020-04-27 DIAGNOSIS — K21.9 GASTROESOPHAGEAL REFLUX DISEASE, ESOPHAGITIS PRESENCE NOT SPECIFIED: ICD-10-CM

## 2020-04-27 PROCEDURE — 99214 OFFICE O/P EST MOD 30 MIN: CPT | Mod: 95 | Performed by: NURSE PRACTITIONER

## 2020-04-27 NOTE — PROGRESS NOTES
"Jose Gaines is a 59 year old male who is being evaluated via a billable telephone visit.      The patient has been notified of following:     \"This telephone visit will be conducted via a call between you and your physician/provider. We have found that certain health care needs can be provided without the need for a physical exam.  This service lets us provide the care you need with a short phone conversation.  If a prescription is necessary we can send it directly to your pharmacy.  If lab work is needed we can place an order for that and you can then stop by our lab to have the test done at a later time.    Telephone visits are billed at different rates depending on your insurance coverage. During this emergency period, for some insurers they may be billed the same as an in-person visit.  Please reach out to your insurance provider with any questions.    If during the course of the call the physician/provider feels a telephone visit is not appropriate, you will not be charged for this service.\"    Patient has given verbal consent for Telephone visit?  Yes    How would you like to obtain your AVS? E-Mail (inform patient AVS not encrypted)    Subjective     Jose Gaines is a 59 year old male who presents to clinic today for the following health issues:    HPI    Accent makes it tough to understand patient at times.     Stomach pain and diarrhea for 2 weeks. Everything he eats it comes right out. Sometime a little. Had blood in stool long time ago. Unsure if he has hx of hemorrhoids. Denies straining with having a BM. Thinks he might be drinking enough water.  Is on HTN medication and diuretics. Breathing okay, no chest pain. No muscle cramping/aching. Has some diarrhea in June 2019, also had colonoscopy last year-he said it was normal. Maybe tried some OTC medication unsure if it helped or not. No vomiting. Breathing okay. No chest pain.         Patient Active Problem List   Diagnosis     Seasonal allergic " rhinitis     CARDIOVASCULAR SCREENING; LDL GOAL LESS THAN 130     Chronic hepatitis B (H)     Essential hypertension     Hyperlipidemia     Internal hemorrhoid     Language barrier     Oral ulcer     Overweight     Pain of meniscus of right knee     Past Surgical History:   Procedure Laterality Date     SURGICAL HISTORY OF -   1/31/05 & 8/05    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       Social History     Tobacco Use     Smoking status: Never Smoker     Smokeless tobacco: Never Used   Substance Use Topics     Alcohol use: Yes     Family History   Problem Relation Age of Onset     Diabetes Father 50     Diabetes Brother         unknown age of onset         Current Outpatient Medications   Medication Sig Dispense Refill     aspirin 81 MG EC tablet Take 81 mg by mouth daily       hydrochlorothiazide (HYDRODIURIL) 25 MG tablet TAKE ONE TABLET BY MOUTH DAILY 90 tablet 1     multivitamin  with lutein (OCUVITE WITH LTEIN) CAPS per capsule Take 1 capsule by mouth daily 30 capsule 11     omeprazole (PRILOSEC) 20 MG DR capsule Take 1 capsule (20 mg) by mouth daily 90 capsule 0     simvastatin (ZOCOR) 20 MG tablet Take 1 tablet (20 mg) by mouth At Bedtime ++NEED FASTING LABS FOR ADDITIONAL REFILLS++ 90 tablet 0     triamterene-HCTZ (DYAZIDE) 37.5-25 MG capsule Take 1 capsule by mouth every morning 90 capsule 3     valACYclovir (VALTREX) 1000 mg tablet TAKE 1 TABLET(1000 MG) BY MOUTH TWICE DAILY FOR 10 DAYS 20 tablet 3     No Known Allergies    Reviewed and updated as needed this visit by Provider  Tobacco  Allergies  Meds  Problems  Med Hx  Surg Hx  Fam Hx         Review of Systems   ROS COMP: Constitutional, cardiovascular, pulmonary, gi-as above and gu systems are negative, except as otherwise noted.       Objective   Reported vitals:  There were no vitals taken for this visit.   healthy, alert and no distress  PSYCH: Alert and oriented times 3; coherent speech, normal   rate and volume, able to articulate logical thoughts, able    to abstract reason, no tangential thoughts, no hallucinations   or delusions  His affect is normal and pleasant  RESP: No cough, no audible wheezing, able to talk in full sentences  Remainder of exam unable to be completed due to telephone visits    Diagnostic Test Results:  Labs reviewed in Epic        Assessment/Plan:  1. Gastroesophageal reflux disease, esophagitis presence not specified  Start again  - omeprazole (PRILOSEC) 20 MG DR capsule; Take 1 capsule (20 mg) by mouth daily  Dispense: 90 capsule; Refill: 0    2. Diarrhea, unspecified type  Get labs in 1 day and test stool for infection. Do not take omeprazole until after collect stool samples. Go to drive [facility] tent at [facility] for BP/HR and labs.  - CBC with platelets; Future  - Fecal colorectal cancer screen (FIT); Future  - H Pylori antigen stool; Future  - Clostridium difficile Toxin B PCR; Future  - Enteric Bacteria and Virus Panel by [facility] Stool; Future  - **Comprehensive metabolic panel FUTURE anytime; Future    3. Hyperlipidemia LDL goal <100  As above  - Lipid panel reflex to direct LDL Fasting; Future    Return in about 1 week (around 5/4/2020), or if symptoms worsen or fail to improve.      Phone call duration:  12 minutes    YESIKA Conklin, NP-C  [facility]

## 2020-04-28 DIAGNOSIS — E78.5 HYPERLIPIDEMIA LDL GOAL <100: ICD-10-CM

## 2020-04-28 DIAGNOSIS — R19.7 DIARRHEA, UNSPECIFIED TYPE: ICD-10-CM

## 2020-04-28 PROCEDURE — 87493 C DIFF AMPLIFIED PROBE: CPT | Performed by: NURSE PRACTITIONER

## 2020-04-28 PROCEDURE — 87506 IADNA-DNA/RNA PROBE TQ 6-11: CPT | Performed by: NURSE PRACTITIONER

## 2020-04-28 PROCEDURE — 87338 HPYLORI STOOL AG IA: CPT | Performed by: NURSE PRACTITIONER

## 2020-04-28 PROCEDURE — 82274 ASSAY TEST FOR BLOOD FECAL: CPT | Performed by: NURSE PRACTITIONER

## 2020-04-29 ENCOUNTER — TELEPHONE (OUTPATIENT)
Dept: FAMILY MEDICINE | Facility: CLINIC | Age: 60
End: 2020-04-29

## 2020-04-29 DIAGNOSIS — E78.5 HYPERLIPIDEMIA LDL GOAL <100: ICD-10-CM

## 2020-04-29 DIAGNOSIS — R19.7 DIARRHEA, UNSPECIFIED TYPE: ICD-10-CM

## 2020-04-29 DIAGNOSIS — E87.6 HYPOKALEMIA: Primary | ICD-10-CM

## 2020-04-29 LAB
ALBUMIN SERPL-MCNC: 3.7 G/DL (ref 3.4–5)
ALP SERPL-CCNC: 51 U/L (ref 40–150)
ALT SERPL W P-5'-P-CCNC: 56 U/L (ref 0–70)
ANION GAP SERPL CALCULATED.3IONS-SCNC: 4 MMOL/L (ref 3–14)
AST SERPL W P-5'-P-CCNC: 28 U/L (ref 0–45)
BILIRUB SERPL-MCNC: 0.7 MG/DL (ref 0.2–1.3)
BUN SERPL-MCNC: 14 MG/DL (ref 7–30)
C COLI+JEJUNI+LARI FUSA STL QL NAA+PROBE: NOT DETECTED
C DIFF TOX B STL QL: NEGATIVE
CALCIUM SERPL-MCNC: 8.6 MG/DL (ref 8.5–10.1)
CHLORIDE SERPL-SCNC: 102 MMOL/L (ref 94–109)
CHOLEST SERPL-MCNC: 202 MG/DL
CO2 SERPL-SCNC: 31 MMOL/L (ref 20–32)
CREAT SERPL-MCNC: 0.77 MG/DL (ref 0.66–1.25)
EC STX1 GENE STL QL NAA+PROBE: NOT DETECTED
EC STX2 GENE STL QL NAA+PROBE: NOT DETECTED
ENTERIC PATHOGEN COMMENT: NORMAL
ERYTHROCYTE [DISTWIDTH] IN BLOOD BY AUTOMATED COUNT: 13.7 % (ref 10–15)
GFR SERPL CREATININE-BSD FRML MDRD: >90 ML/MIN/{1.73_M2}
GLUCOSE SERPL-MCNC: 114 MG/DL (ref 70–99)
HCT VFR BLD AUTO: 44.5 % (ref 40–53)
HDLC SERPL-MCNC: 71 MG/DL
HEMOCCULT STL QL IA: NEGATIVE
HGB BLD-MCNC: 15.3 G/DL (ref 13.3–17.7)
LDLC SERPL CALC-MCNC: 100 MG/DL
MCH RBC QN AUTO: 29.1 PG (ref 26.5–33)
MCHC RBC AUTO-ENTMCNC: 34.4 G/DL (ref 31.5–36.5)
MCV RBC AUTO: 85 FL (ref 78–100)
NONHDLC SERPL-MCNC: 131 MG/DL
NOROV GI+II ORF1-ORF2 JNC STL QL NAA+PR: NOT DETECTED
PLATELET # BLD AUTO: 200 10E9/L (ref 150–450)
POTASSIUM SERPL-SCNC: 3.3 MMOL/L (ref 3.4–5.3)
PROT SERPL-MCNC: 7.8 G/DL (ref 6.8–8.8)
RBC # BLD AUTO: 5.25 10E12/L (ref 4.4–5.9)
RVA NSP5 STL QL NAA+PROBE: NOT DETECTED
SALMONELLA SP RPOD STL QL NAA+PROBE: NOT DETECTED
SHIGELLA SP+EIEC IPAH STL QL NAA+PROBE: NOT DETECTED
SODIUM SERPL-SCNC: 137 MMOL/L (ref 133–144)
SPECIMEN SOURCE: NORMAL
TRIGL SERPL-MCNC: 153 MG/DL
V CHOL+PARA RFBL+TRKH+TNAA STL QL NAA+PR: NOT DETECTED
WBC # BLD AUTO: 5.2 10E9/L (ref 4–11)
Y ENTERO RECN STL QL NAA+PROBE: NOT DETECTED

## 2020-04-29 PROCEDURE — 85027 COMPLETE CBC AUTOMATED: CPT | Performed by: NURSE PRACTITIONER

## 2020-04-29 PROCEDURE — 80053 COMPREHEN METABOLIC PANEL: CPT | Performed by: NURSE PRACTITIONER

## 2020-04-29 PROCEDURE — 36415 COLL VENOUS BLD VENIPUNCTURE: CPT | Performed by: NURSE PRACTITIONER

## 2020-04-29 PROCEDURE — 80061 LIPID PANEL: CPT | Performed by: NURSE PRACTITIONER

## 2020-04-29 RX ORDER — POTASSIUM CHLORIDE 1500 MG/1
20 TABLET, EXTENDED RELEASE ORAL 2 TIMES DAILY
Qty: 4 TABLET | Refills: 0 | Status: SHIPPED | OUTPATIENT
Start: 2020-04-29 | End: 2020-06-04

## 2020-04-29 NOTE — TELEPHONE ENCOUNTER
Please call patient: his cholesterol is mildly elevated, we can monitor for now and recheck in a year. His potassium is a little low, not surprising because of his diarrhea and being on a diuretic. Provider will send extra potassium to take orally, twice a day for 2 days. He should continue to push fluids. His stool samples are pending still and should hopefully be back in 1-2 days.     Send back to provider if further questions.  Thank you,  YESIKA Conklin, NP-C  [facility]

## 2020-04-29 NOTE — TELEPHONE ENCOUNTER
..patient  returned call    Best number to reach caller: Cell number on file:    Telephone Information:   Mobile [phone]       Is it ok to leave a detailed message: YES

## 2020-04-29 NOTE — TELEPHONE ENCOUNTER
This writer attempted to contact Jose on 04/29/20 via [employer]  #[id number]      Reason for call lab results and left message.      If patient calls back:   Registered Nurse called. Follow Triage Call workflow            Katlyn Sylvester RN, BSN, PHN

## 2020-04-29 NOTE — TELEPHONE ENCOUNTER
Patient was given message and he had no further questions or concerns at this time.       Katlyn Sylvester RN, BSN, PHN

## 2020-04-30 ENCOUNTER — TELEPHONE (OUTPATIENT)
Dept: FAMILY MEDICINE | Facility: CLINIC | Age: 60
End: 2020-04-30

## 2020-04-30 DIAGNOSIS — A04.8 H. PYLORI INFECTION: Primary | ICD-10-CM

## 2020-04-30 DIAGNOSIS — E78.5 HYPERLIPIDEMIA LDL GOAL <100: ICD-10-CM

## 2020-04-30 LAB — H PYLORI AG STL QL IA: POSITIVE

## 2020-04-30 RX ORDER — AMOXICILLIN 500 MG/1
1000 CAPSULE ORAL 2 TIMES DAILY
Qty: 56 CAPSULE | Refills: 0 | Status: SHIPPED | OUTPATIENT
Start: 2020-04-30 | End: 2020-06-04

## 2020-04-30 RX ORDER — CLARITHROMYCIN 500 MG
500 TABLET ORAL 2 TIMES DAILY
Qty: 28 TABLET | Refills: 0 | Status: SHIPPED | OUTPATIENT
Start: 2020-04-30 | End: 2020-06-04

## 2020-04-30 RX ORDER — PANTOPRAZOLE SODIUM 20 MG/1
20 TABLET, DELAYED RELEASE ORAL 2 TIMES DAILY
Qty: 28 TABLET | Refills: 0 | Status: SHIPPED | OUTPATIENT
Start: 2020-04-30 | End: 2020-05-26

## 2020-04-30 RX ORDER — SIMVASTATIN 20 MG
20 TABLET ORAL AT BEDTIME
Qty: 90 TABLET | Refills: 0 | COMMUNITY
Start: 2020-04-30 | End: 2020-09-29

## 2020-04-30 NOTE — TELEPHONE ENCOUNTER
Please call patient. The only stool sample that came back abnormal was the H. Pylori infection. This causes abdominal pain and could be part of the reason of your diarrhea. This is treatable although the treatment regimen is for 2 weeks and is 2 different antibiotics and a strong antacid. Please take all medication, do not stop early as this could cause the infection to return. We do not need to do a test of cure, we assume you are healed after this. If you have on-going diarrhea after treatment please do another virtual visit. Also make sure to STOP the simvastatin 1 day prior to starting your antibiotic treatment and hold that medication until 1 day after your treatment is done. There is a high drug interaction between the clarithromycin and the simvastatin.     Route back to provider if questions.  Thank you,  YESIKA Conklin, NP-C  [facility]

## 2020-04-30 NOTE — TELEPHONE ENCOUNTER
Called to discuss provider note with patient.     Also discussed medication therapy in detail with the patient    Patient was able to provide verbal read back on the medication.     Pharmacy that the medication was sent to was verified with the patient and patient also demonstrated an understanding about holding simvastatin 1 day prior and after the therapy has started.     Maria Del Rosario Alcantar RN  [facility]

## 2020-05-04 DIAGNOSIS — E87.6 HYPOKALEMIA: ICD-10-CM

## 2020-05-04 RX ORDER — POTASSIUM CHLORIDE 1500 MG/1
20 TABLET, EXTENDED RELEASE ORAL 2 TIMES DAILY
Qty: 4 TABLET | Refills: 0 | Status: CANCELLED | OUTPATIENT
Start: 2020-05-04

## 2020-05-04 NOTE — TELEPHONE ENCOUNTER
"Requested Prescriptions   Pending Prescriptions Disp Refills     potassium chloride ER (KLOR-CON M) 20 MEQ CR tablet 4 tablet 0     Sig: Take 1 tablet (20 mEq) by mouth 2 times daily       Potassium Supplements Protocol Failed - 5/4/2020 10:13 AM        Failed - Normal serum potassium in past 12 months     Recent Labs   Lab Test 04/29/20  0857   POTASSIUM 3.3*                    Passed - Recent (12 mo) or future (30 days) visit within the authorizing provider's department     Patient has had an office visit with the authorizing provider or a provider within the authorizing providers department within the previous 12 mos or has a future within next 30 days. See \"Patient Info\" tab in inbasket, or \"Choose Columns\" in Meds & Orders section of the refill encounter.              Passed - Medication is active on med list        Passed - Patient is age 18 or older           potassium chloride ER (KLOR-CON M) 20 MEQ CR tablet   Last Written Prescription Date:  4/29/2020  Last Fill Quantity: 4,  # refills: 0   Last office visit: 4/27/2020 with prescribing provider:  Jeni Zambrano   Future Office Visit:      "

## 2020-05-06 NOTE — TELEPHONE ENCOUNTER
Routing refill request to provider for review/approval because:  Labs not current:  Potassium    Payal De Oliveira RN, [facility] Triage

## 2020-05-06 NOTE — TELEPHONE ENCOUNTER
Patient needs repeat potassium lab prior to getting more oral medication. Order placed. Please call patient and let him know.  YESIKA Conklin, NP-C  [facility]

## 2020-05-19 ENCOUNTER — VIRTUAL VISIT (OUTPATIENT)
Dept: FAMILY MEDICINE | Facility: CLINIC | Age: 60
End: 2020-05-19
Payer: COMMERCIAL

## 2020-05-19 ENCOUNTER — TELEPHONE (OUTPATIENT)
Dept: FAMILY MEDICINE | Facility: CLINIC | Age: 60
End: 2020-05-19

## 2020-05-19 ENCOUNTER — NURSE TRIAGE (OUTPATIENT)
Dept: FAMILY MEDICINE | Facility: CLINIC | Age: 60
End: 2020-05-19

## 2020-05-19 DIAGNOSIS — A04.8 HELICOBACTER PYLORI (H. PYLORI) INFECTION: ICD-10-CM

## 2020-05-19 DIAGNOSIS — K52.9 CHRONIC DIARRHEA: Primary | ICD-10-CM

## 2020-05-19 PROCEDURE — 99207 ZZC NON-BILLABLE SERV PER CHARTING: CPT | Mod: TEL | Performed by: PHYSICIAN ASSISTANT

## 2020-05-19 RX ORDER — FLUTICASONE PROPIONATE 50 MCG
SPRAY, SUSPENSION (ML) NASAL
COMMUNITY
Start: 2020-03-24

## 2020-05-19 ASSESSMENT — PAIN SCALES - GENERAL: PAINLEVEL: NO PAIN (0)

## 2020-05-19 NOTE — TELEPHONE ENCOUNTER
Jose returned call    Best number to reach caller: Home number on file [phone] (home)    Is it ok to leave a detailed message: YES     Appointment scheduled with Dr Tse 05/20 demian Gray

## 2020-05-19 NOTE — PATIENT INSTRUCTIONS
Schedule a follow up with one of the gastroenterologists at [facility] ([phone]).    Return urgently if any change in symptoms like fever, blood in stool, increasing pain or other change in symptoms.     I do recommend a face to face visit with one of the providers at [facility] or [facility] as soon as possible. You should have received a message.          At [facility], we strive to deliver an exceptional experience to you, every time we see you. If you receive a survey, please complete it as we do value your feedback.  If you have MyChart, you can expect to receive results automatically within 24 hours of their completion.  Your provider will send a note interpreting your results as well.   If you do not have MyChart, you should receive your results in about a week by mail.    Your care team:                            Family Medicine Internal Medicine   MD Ky Crawley MD Shantel Branch-Fleming, MD Katya Georgiev PA-C Megan Hill, APRN ABELARDO Flowers MD Pediatrics   CURTIS Davis, MD Theresa Jeter APRN CNP   MD Ronda Angelo MD Deborah Mielke, MD Kim Thein, APRN [facility]      Clinic hours: Monday - Thursday 7 am-7 pm; Fridays 7 am-5 pm.   Urgent care: Monday - Friday 11 am-9 pm; Saturday and Sunday 9 am-5 pm.    Clinic: [phone]       [facility]: Monday - Thursday 8 am - 7 pm; Friday 8 am - 6 pm  [facility]: [phone]     Use [email or web address] for 24/7 diagnosis and treatment of dozens of conditions.

## 2020-05-19 NOTE — PROGRESS NOTES
"Jose Gaines is a 59 year old male who is being evaluated via a billable telephone visit.      The patient has been notified of following:     \"This telephone visit will be conducted via a call between you and your physician/provider. We have found that certain health care needs can be provided without the need for a physical exam.  This service lets us provide the care you need with a short phone conversation.  If a prescription is necessary we can send it directly to your pharmacy.  If lab work is needed we can place an order for that and you can then stop by our lab to have the test done at a later time.    Telephone visits are billed at different rates depending on your insurance coverage. During this emergency period, for some insurers they may be billed the same as an in-person visit.  Please reach out to your insurance provider with any questions.    If during the course of the call the physician/provider feels a telephone visit is not appropriate, you will not be charged for this service.\"    Patient has given verbal consent for Telephone visit?  Yes    What phone number would you like to be contacted at? [phone]    How would you like to obtain your AVS? Mail a copy    Subjective     Jose Gaines is a 59 year old male who presents via phone visit today for the following health issues:    HPI  Diarrhea  Onset: 2 weeks     Description:   Consistency of stool: watery and loose  Blood in stool: no   Number of loose stools in past 24 hours: 4    Progression of Symptoms:  worsening    Accompanying Signs & Symptoms:  Fever: no   Nausea or vomiting; YES  Abdominal pain: YES  Episodes of constipation: no   Weight loss: no     History:   Ill contacts: no   Recent use of antibiotics: YES   Recent travels: no          Recent medication-new or changes(Rx or OTC): no     Precipitating factors:   None     Alleviating factors:   None     Therapies Tried and outcome:  Prescriptions as below ; Outcome: not " improved    Diarrhea unchanged from virtual visit 4/27/2020   On amoxicillin and clarithromycin as well as omeprazole and pantoprazole for helicobactor pylori in stool found as a result of that visit   Has not finished antibiotic therapy  Taking pantoprazole daily   After eating will have very loose stool  Has diarrhea after eating.  No improvement.  Stools are still loose.   3-4 stools today.  Soft and liquid stools.  I eat rice, cooked fish, chicken.  I have problems with diarrhea.  No family history of colitis or colon cancer  No weight loss   Dad had diabetes.      Reports that typically he Cannot drink milk. 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       Social History     Tobacco Use     Smoking status: Never Smoker     Smokeless tobacco: Never Used   Substance Use Topics     Alcohol use: Yes     Family History   Problem Relation Age of Onset     Diabetes Father 50     Diabetes Brother         unknown age of onset         Current Outpatient Medications   Medication Sig Dispense Refill     aspirin 81 MG EC tablet Take 81 mg by mouth daily       fluticasone (FLONASE) 50 MCG/ACT nasal spray SHAKE LQ AND U 1 SPR IEN QD       hydrochlorothiazide (HYDRODIURIL) 25 MG tablet TAKE ONE TABLET BY MOUTH DAILY 90 tablet 1     multivitamin  with lutein (OCUVITE WITH LTEIN) CAPS per capsule Take 1 capsule by mouth daily 30 capsule 11     omeprazole (PRILOSEC) 20  MG DR capsule Take 1 capsule (20 mg) by mouth daily 90 capsule 0     simvastatin (ZOCOR) 20 MG tablet Take 1 tablet (20 mg) by mouth At Bedtime ++NEED FASTING LABS FOR ADDITIONAL REFILLS++ 90 tablet 0     triamterene-HCTZ (DYAZIDE) 37.5-25 MG capsule Take 1 capsule by mouth every morning 90 capsule 3     valACYclovir (VALTREX) 1000 mg tablet TAKE 1 TABLET(1000 MG) BY MOUTH TWICE DAILY FOR 10 DAYS 20 tablet 3     BP Readings from Last 3 Encounters:   03/13/20 (!) 167/88   01/27/20 135/80   12/05/19 (!) 162/92    Wt Readings from Last 3 Encounters:   03/13/20 68.3 kg (150 lb 9.6 oz)   01/27/20 67.9 kg (149 lb 12.8 oz)   12/05/19 67.6 kg (149 lb)                    Reviewed and updated as needed this visit by Provider  Tobacco  Allergies  Meds  Problems  Med Hx  Surg Hx  Fam Hx  Soc Hx          Review of Systems   Constitutional, HEENT, cardiovascular, pulmonary, gi and gu systems are negative, except as otherwise noted.       Objective   Reported vitals:  There were no vitals taken for this visit.   healthy, alert and no distress  PSYCH: Alert and oriented times 3; coherent speech, normal   rate and volume, able to articulate logical thoughts, able   to abstract reason, no tangential thoughts, no hallucinations   or delusions  His affect is normal and pleasant  RESP: No cough, no audible wheezing, able to talk in full sentences  Remainder of exam unable to be completed due to telephone visits    Diagnostic Test Results:  none         Assessment/Plan:  1. Chronic diarrhea  Referred to gastroenterology for further evaluation but in thinking more think he should have an in clinic evaluation first- attempted to reach patient over the phone but no answer - will route to team as well- also left a message to call back   - GASTROENTEROLOGY ADULT REF CONSULT ONLY; Future    2. Helicobacter pylori (H. pylori) infection  Initially I had referred to GI given lack of response to antibiotic therapy but feel he should have a  face to face visit with FP or IM first   - GASTROENTEROLOGY ADULT REF CONSULT ONLY; Future    No follow-ups on file.      Phone call duration:  12 minutes  No charge since recommended a face to face visit    Mirtha Golden PA-C

## 2020-05-19 NOTE — TELEPHONE ENCOUNTER
I had a virtual visit with patient over the phone today 5/19/2020 and I initially had referred to GI- I think he should have a face to face visit first to evaluate in person given 2 virtual visits - please assist with scheduling face to visit Friday 5/20/20 for evaluation.

## 2020-05-19 NOTE — TELEPHONE ENCOUNTER
Reason for Call:  Other call back and prescription    Detailed comments: Was prescribed a few medications which he has been taking for a couple weeks. Says it helped a little bit but his symptoms are now back. Wondering if what he should do. Please call to advise.  Thank you     Phone Number Patient can be reached at: Home number on file [phone] (home)    Best Time: Any    Can we leave a detailed message on this number? YES    Call taken on 5/19/2020 at 11:31 AM by Lizbeth Lara

## 2020-05-19 NOTE — TELEPHONE ENCOUNTER
"Patient is requesting to discuss with a Dr today.     Telephone visit scheduled per patient request    Maria Del Rosario Alcantar RN  [facility]    Answer Assessment - Initial Assessment Questions  1. DIARRHEA SEVERITY: \"How bad is the diarrhea?\" \"How many extra stools have you had in the past 24 hours than normal?\"     - NO DIARRHEA (SCALE 0)    - MILD (SCALE 1-3): Few loose or mushy BMs; increase of 1-3 stools over normal daily number of stools; mild increase in ostomy output.    -  MODERATE (SCALE 4-7): Increase of 4-6 stools daily over normal; moderate increase in ostomy output.  * SEVERE (SCALE 8-10; OR 'WORST POSSIBLE'): Increase of 7 or more stools daily over normal; moderate increase in ostomy output; incontinence.      3  2. ONSET: \"When did the diarrhea begin?\"       It is happening everyday after I eat about 4-5 days  3. BM CONSISTENCY: \"How loose or watery is the diarrhea?\"       Very watery  4. VOMITING: \"Are you also vomiting?\" If so, ask: \"How many times in the past 24 hours?\"       No vomiting  5. ABDOMINAL PAIN: \"Are you having any abdominal pain?\" If yes: \"What does it feel like?\" (e.g., crampy, dull, intermittent, constant)       NO  6. ABDOMINAL PAIN SEVERITY: If present, ask: \"How bad is the pain?\"  (e.g., Scale 1-10; mild, moderate, or severe)    - MILD (1-3): doesn't interfere with normal activities, abdomen soft and not tender to touch     - MODERATE (4-7): interferes with normal activities or awakens from sleep, tender to touch     - SEVERE (8-10): excruciating pain, doubled over, unable to do any normal activities        NO pain  7. ORAL INTAKE: If vomiting, \"Have you been able to drink liquids?\" \"How much fluids have you had in the past 24 hours?\"      States enough water throughout the day. I do not feel thirsty  8. HYDRATION: \"Any signs of dehydration?\" (e.g., dry mouth [not just dry lips], too weak to stand, dizziness, new weight loss) \"When did you last " "urinate?\"      No. Last urinated about 2 hrs ago  9. EXPOSURE: \"Have you traveled to a foreign country recently?\" \"Have you been exposed to anyone with diarrhea?\" \"Could you have eaten any food that was spoiled?\"      no  10. ANTIBIOTIC USE: \"Are you taking antibiotics now or have you taken antibiotics in the past 2 months?\"        yes  11. OTHER SYMPTOMS: \"Do you have any other symptoms?\" (e.g., fever, blood in stool)        NO  12. PREGNANCY: \"Is there any chance you are pregnant?\" \"When was your last menstrual period?\"        no    Protocols used: DIARRHEA-A-OH    "

## 2020-05-21 DIAGNOSIS — A04.8 H. PYLORI INFECTION: ICD-10-CM

## 2020-05-21 NOTE — TELEPHONE ENCOUNTER
"Requested Prescriptions   Pending Prescriptions Disp Refills     amoxicillin (AMOXIL) 500 MG capsule 56 capsule 0     Sig: Take 2 capsules (1,000 mg) by mouth 2 times daily       Oral Acne/Rosacea Medications Protocol Failed - 5/21/2020  1:22 PM        Failed - Confirmation of diagnosis is required     Please confirm diagnosis is acne or rosacea.     If NOT acne or rosacea; refer request to provider for further evaluation.    If diagnosis IS acne or rosacea, OK to refill BASED ON PREVIOUS REFILL CLINICAL NOTE RECOMMENDATION.          Passed - Patient is 12 years of age or older        Passed - Recent (12 mo) or future (30 days) visit within the authorizing provider's specialty     Patient has had an office visit with the authorizing provider or a provider within the authorizing providers department within the previous 12 mos or has a future within next 30 days. See \"Patient Info\" tab in inbasket, or \"Choose Columns\" in Meds & Orders section of the refill encounter.              Passed - Medication is active on med list           amoxicillin (AMOXIL) 500 MG capsule  Last Written Prescription Date:  4/30/2020  Last Fill Quantity: 56,  # refills: 0   Last office visit: Visit date not found with prescribing provider:  Jeni Zambrano   Future Office Visit:            "

## 2020-05-21 NOTE — LETTER
Lorie 3, 2020      Jose Gaines  [address]  [place] [address]        Dear Jose,     If you have on-going diarrhea and are worried about infection you do need another appointment. Please call the above number to schedule one if you need it.       Sincerely,        YESIKA Conklin, NP-C  [facility]

## 2020-05-21 NOTE — TELEPHONE ENCOUNTER
Requested Prescriptions   Pending Prescriptions Disp Refills     clarithromycin (BIAXIN) 500 MG tablet 28 tablet 0     Sig: Take 1 tablet (500 mg) by mouth 2 times daily HOLD SIMVASTATIN WHILE TAKING THIS MEDICATION       There is no refill protocol information for this order        clarithromycin (BIAXIN) 500 MG tablet  Last Written Prescription Date:  4/30/2020  Last Fill Quantity: 28,  # refills: 0   Last office visit: Visit date not found with prescribing provider:  Jeni Zambrano   Future Office Visit:

## 2020-05-21 NOTE — TELEPHONE ENCOUNTER
"Requested Prescriptions   Pending Prescriptions Disp Refills     pantoprazole (PROTONIX) 20 MG EC tablet 28 tablet 0     Sig: Take 1 tablet (20 mg) by mouth 2 times daily       PPI Protocol Passed - 5/21/2020  1:18 PM        Passed - Not on Clopidogrel (unless Pantoprazole ordered)        Passed - No diagnosis of osteoporosis on record        Passed - Recent (12 mo) or future (30 days) visit within the authorizing provider's specialty     Patient has had an office visit with the authorizing provider or a provider within the authorizing providers department within the previous 12 mos or has a future within next 30 days. See \"Patient Info\" tab in inbasket, or \"Choose Columns\" in Meds & Orders section of the refill encounter.              Passed - Medication is active on med list        Passed - Patient is age 18 or older           pantoprazole (PROTONIX) 20 MG EC tablet  Last Written Prescription Date:  4/30/2020  Last Fill Quantity: 28,  # refills: 0   Last office visit: Visit date not found with prescribing provider:  Jeni Zambrano [id number]   Future Office Visit:            "

## 2020-05-26 RX ORDER — PANTOPRAZOLE SODIUM 20 MG/1
20 TABLET, DELAYED RELEASE ORAL 2 TIMES DAILY
Qty: 60 TABLET | Refills: 0 | Status: SHIPPED | OUTPATIENT
Start: 2020-05-26 | End: 2020-06-29

## 2020-05-26 NOTE — TELEPHONE ENCOUNTER
Routing refill request to provider for review/approval because:  Indicates short term treatment- sig line  Dariela Denis RN  [facility]

## 2020-05-26 NOTE — TELEPHONE ENCOUNTER
Routing refill request to provider for review/approval because:  Drug not on the [facility] refill protocol   Dariela Denis RN  [facility]

## 2020-05-26 NOTE — TELEPHONE ENCOUNTER
Routing refill request to provider for review/approval because:  Drug not on the [facility] refill protocol   Dariela Denis RN  [facility]

## 2020-05-27 RX ORDER — AMOXICILLIN 500 MG/1
1000 CAPSULE ORAL 2 TIMES DAILY
Qty: 56 CAPSULE | Refills: 0 | OUTPATIENT
Start: 2020-05-27

## 2020-05-27 RX ORDER — CLARITHROMYCIN 500 MG
500 TABLET ORAL 2 TIMES DAILY
Qty: 28 TABLET | Refills: 0 | OUTPATIENT
Start: 2020-05-27

## 2020-05-27 NOTE — TELEPHONE ENCOUNTER
Patient needs in-person visit, please help to schedule if he has on-going diarrhea.   YESIKA Conklin, NP-C  [facility]

## 2020-05-28 NOTE — TELEPHONE ENCOUNTER
This writer attempted to contact pt on 05/28/20      Reason for call schedule Office Visit and left message.      If patient calls back:   Schedule Office Visit appointment within 2 business days with primary care, document that pt called and close encounter         Elyssa Dawn MA

## 2020-05-29 NOTE — TELEPHONE ENCOUNTER
This writer attempted to contact pt via interpretor on 05/29/20      Reason for call schedule in office visit and left message.      If patient calls back:   Schedule Office Visit appointment within 1 week with primary care, document that pt called and close encounter         Radha Rader

## 2020-06-02 NOTE — TELEPHONE ENCOUNTER
LM for pt to call clinic.  3rd attempt, with no response.  However, pt does have tele visit with Ky Stone MD on 6/4/20. Please advise.    VIRGILIO Caal.

## 2020-06-03 NOTE — TELEPHONE ENCOUNTER
Please mail letter then close encounter, it is in basket at [facility].  Thank you,  YESIKA Conklin, NP-C  [facility]

## 2020-06-04 ENCOUNTER — VIRTUAL VISIT (OUTPATIENT)
Dept: FAMILY MEDICINE | Facility: CLINIC | Age: 60
End: 2020-06-04
Payer: COMMERCIAL

## 2020-06-04 DIAGNOSIS — G51.31 CLONIC HEMIFACIAL SPASM OF MUSCLE OF RIGHT SIDE OF FACE: Primary | ICD-10-CM

## 2020-06-04 DIAGNOSIS — M17.11 PRIMARY OSTEOARTHRITIS OF RIGHT KNEE: ICD-10-CM

## 2020-06-04 PROCEDURE — 99214 OFFICE O/P EST MOD 30 MIN: CPT | Mod: 95 | Performed by: INTERNAL MEDICINE

## 2020-06-04 RX ORDER — GABAPENTIN 100 MG/1
100 CAPSULE ORAL 3 TIMES DAILY
Qty: 90 CAPSULE | Refills: 5 | Status: SHIPPED | OUTPATIENT
Start: 2020-06-04 | End: 2020-09-29

## 2020-06-04 NOTE — PROGRESS NOTES
"Jose Gaines is a 59 year old male who is being evaluated via a billable telephone visit.      The patient has been notified of following:     \"This telephone visit will be conducted via a call between you and your physician/provider. We have found that certain health care needs can be provided without the need for a physical exam.  This service lets us provide the care you need with a short phone conversation.  If a prescription is necessary we can send it directly to your pharmacy.  If lab work is needed we can place an order for that and you can then stop by our lab to have the test done at a later time.    Telephone visits are billed at different rates depending on your insurance coverage. During this emergency period, for some insurers they may be billed the same as an in-person visit.  Please reach out to your insurance provider with any questions.    If during the course of the call the physician/provider feels a telephone visit is not appropriate, you will not be charged for this service.\"    Patient has given verbal consent for Telephone visit?  Yes    What phone number would you like to be contacted at? [phone]    How would you like to obtain your AVS? Mail a copy    Subjective     Jose Gaines is a 59 year old male who presents via phone visit today for the following health issues:    HPI  Eye(s) Problem  Onset: 2 months ago    Description:   Location: right  Pain: no   Redness: YES    Accompanying Signs & Symptoms:  Discharge/mattering: no   Swelling: no   Visual changes: no   Fever: no   Nasal Congestion: no   Bothered by bright lights: YES  Patient states that he has difficulty opening his right eye. Cheek below right eye is \"not working well\", like it's vibrating 24/7.  Headaches: (-)   Facial drooping (-)  Numbness (+), not as severe as \"vibration\" sensation.    History: Seen by this provider last   Trauma: no   Foreign body exposure: no     Precipitating factors:   Wearing contacts: no "     Alleviating factors:  Improved by: None  Therapies Tried and outcome: None      Patient Active Problem List   Diagnosis     Seasonal allergic rhinitis     CARDIOVASCULAR SCREENING; LDL GOAL LESS THAN 130     Chronic hepatitis B (H)     Essential hypertension     Hyperlipidemia     Internal hemorrhoid     Language barrier     Oral ulcer     Overweight     Pain of meniscus of right knee     Past Surgical History:   Procedure Laterality Date     SURGICAL HISTORY OF -   1/31/05 & 8/05    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       Social History     Tobacco Use     Smoking status: Never Smoker     Smokeless tobacco: Never Used   Substance Use Topics     Alcohol use: Yes     Family History   Problem Relation Age of Onset     Diabetes Father 50     Diabetes Brother         unknown age of onset     Colon Cancer No family hx of      Colitis No family hx of          Current Outpatient Medications   Medication Sig Dispense Refill     aspirin 81 MG EC tablet Take 81 mg by mouth daily       fluticasone (FLONASE) 50 MCG/ACT nasal spray SHAKE LQ AND U 1 SPR IEN QD       hydrochlorothiazide (HYDRODIURIL) 25 MG tablet TAKE ONE TABLET BY MOUTH DAILY 90 tablet 1     multivitamin  with lutein (OCUVITE WITH LTEIN) CAPS per capsule Take 1 capsule by mouth daily 30 capsule 11     omeprazole (PRILOSEC) 20 MG DR capsule Take 1 capsule (20 mg) by mouth daily 90 capsule 0     pantoprazole (PROTONIX) 20 MG EC tablet Take 1 tablet (20 mg) by mouth 2 times daily 60 tablet 0     simvastatin (ZOCOR) 20 MG tablet Take 1 tablet (20 mg) by mouth At Bedtime ++NEED FASTING LABS FOR ADDITIONAL REFILLS++ 90 tablet 0     triamterene-HCTZ (DYAZIDE) 37.5-25 MG capsule Take 1 capsule by mouth every morning 90 capsule 3     valACYclovir (VALTREX) 1000 mg tablet TAKE 1 TABLET(1000 MG) BY MOUTH TWICE DAILY FOR 10 DAYS 20 tablet 3     No Known Allergies  Recent Labs   Lab Test 04/29/20  0857 03/04/19  0742 02/28/18  1148   * 89 118*   HDL 71 66 93   TRIG 153* 97  74   ALT 56 46  --    CR 0.77 0.76 0.76   GFRESTIMATED >90 >90 >90   GFRESTBLACK >90 >90 >90   POTASSIUM 3.3* 3.3* 3.7      BP Readings from Last 3 Encounters:   03/13/20 (!) 167/88   01/27/20 135/80   12/05/19 (!) 162/92    Wt Readings from Last 3 Encounters:   03/13/20 68.3 kg (150 lb 9.6 oz)   01/27/20 67.9 kg (149 lb 12.8 oz)   12/05/19 67.6 kg (149 lb)                    Reviewed and updated as needed this visit by Provider         Review of Systems   CONSTITUTIONAL: NEGATIVE for fever, chills, change in weight  INTEGUMENTARY/SKIN: NEGATIVE for worrisome rashes, moles or lesions  EYES: NEGATIVE for blurred vision.  ENT/MOUTH: NEGATIVE for ear, mouth and throat problems  RESP: NEGATIVE for significant cough or SOB  CV: NEGATIVE for chest pain, palpitations or peripheral edema  GI: NEGATIVE for nausea, abdominal pain, heartburn, or change in bowel habits  : NEGATIVE for frequency, dysuria, or hematuria  MUSCULOSKELETAL:POSITIVE for worsening left knee pain. Previous MRI of left knee shows complex degenerative tear of the lateral menisci, small-moderate knee effusion and osteoarthritis.  NEURO: NEGATIVE for TIA/CVA.  ENDOCRINE: NEGATIVE for temperature intolerance, skin/hair changes  HEME: NEGATIVE for bleeding problems  PSYCHIATRIC: NEGATIVE for changes in mood or affect       Objective   Reported vitals:  There were no vitals taken for this visit.     Remainder of exam unable to be completed due to telephone visits    Diagnostic Test Results:  Labs reviewed in Epic        Assessment/Plan:  1. 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 Primary osteoarthritis of right knee    - Orthopedic & Spine  Referral; Future  - diclofenac (VOLTAREN) 1 % topical gel; Place 4 g onto the skin 4 times daily   Dispense: 100 g; Refill: 11    Follow-up in 4 weeks.    Phone call duration:  30 minutes  Start: 8:00 AM  End: 8:35 AM    Ky Stone MD

## 2020-06-04 NOTE — PATIENT INSTRUCTIONS
At [facility], we strive to deliver an exceptional experience to you, every time we see you. If you receive a survey, please complete it as we do value your feedback.  If you have MyChart, you can expect to receive results automatically within 24 hours of their completion.  Your provider will send a note interpreting your results as well.   If you do not have MyChart, you should receive your results in about a week by mail.    Your care team:                            Family Medicine Internal Medicine   MD Ky Crawley MD Shantel Branch-Fleming, MD Katya Georgiev PA-C Megan Hill, APRKISHORE Flowers, MD Pediatrics   Conor Marroquin, PAGHANSHYAM Quinteros, MD Theresa Jeter APRN CNP   MD Ronda Angelo MD Deborah Mielke, MD Kim Thein, APRN [facility]      Clinic hours: Monday - Thursday 7 am-7 pm; Fridays 7 am-5 pm.   Urgent care: Monday - Friday 11 am-9 pm; Saturday and Sunday 9 am-5 pm.    Clinic: [phone]       [facility]: Monday - Thursday 8 am - 7 pm; Friday 8 am - 6 pm  [facility]: [phone]     Use [email or web address] for 24/7 diagnosis and treatment of dozens of conditions.

## 2020-06-17 ENCOUNTER — NURSE TRIAGE (OUTPATIENT)
Dept: FAMILY MEDICINE | Facility: CLINIC | Age: 60
End: 2020-06-17

## 2020-06-17 ENCOUNTER — OFFICE VISIT (OUTPATIENT)
Dept: URGENT CARE | Facility: URGENT CARE | Age: 60
End: 2020-06-17
Payer: COMMERCIAL

## 2020-06-17 VITALS
WEIGHT: 149.2 LBS | BODY MASS INDEX: 26.43 KG/M2 | OXYGEN SATURATION: 95 % | RESPIRATION RATE: 22 BRPM | HEART RATE: 86 BPM | DIASTOLIC BLOOD PRESSURE: 92 MMHG | SYSTOLIC BLOOD PRESSURE: 160 MMHG | TEMPERATURE: 98.7 F

## 2020-06-17 DIAGNOSIS — I10 ESSENTIAL HYPERTENSION WITH GOAL BLOOD PRESSURE LESS THAN 140/90: ICD-10-CM

## 2020-06-17 DIAGNOSIS — I10 ESSENTIAL HYPERTENSION: Primary | ICD-10-CM

## 2020-06-17 DIAGNOSIS — I10 HTN, GOAL BELOW 140/80: ICD-10-CM

## 2020-06-17 PROCEDURE — 99213 OFFICE O/P EST LOW 20 MIN: CPT | Performed by: FAMILY MEDICINE

## 2020-06-17 RX ORDER — HYDROCHLOROTHIAZIDE 25 MG/1
25 TABLET ORAL DAILY
Qty: 90 TABLET | Refills: 1 | Status: CANCELLED | OUTPATIENT
Start: 2020-06-17

## 2020-06-17 ASSESSMENT — ENCOUNTER SYMPTOMS
COUGH: 0
VOMITING: 0
PALPITATIONS: 0
WHEEZING: 0
CONSTIPATION: 0
RHINORRHEA: 0
CHILLS: 0
DIARRHEA: 0
BLOOD IN STOOL: 0
DIAPHORESIS: 0
NAUSEA: 0
ABDOMINAL PAIN: 0
BACK PAIN: 0
SORE THROAT: 0
DYSURIA: 0
SHORTNESS OF BREATH: 0
FEVER: 0

## 2020-06-17 NOTE — LETTER
[address] [address]  Phone: [phone]    June 17, 2020        Jose Gaines  [address]  [facility] [address]          To whom it may concern:    RE: Jose Gaines    Patient was seen and treated today at our clinic. He should stay home tomorrow and rest and return to work the following day if feeling well. Follow-up if symptoms persist or worsen.          Sincerely,        Shimon Saavedra MD

## 2020-06-17 NOTE — PROGRESS NOTES
SUBJECTIVE:   Jose Gaines is a 60 year old male presenting with a chief complaint of   Chief Complaint   Patient presents with     Hypertension     170's/80's today. Complains of headache, eye twitching & hand numbness       He is an established patient of [place].    60-year-old male presenting today concerned of over his high blood pressure he is measured a few pressures in the home 170/80.  Pressure in the clinic tonight was 160/92.  He is on hydrochlorothiazide he takes 25 mg daily.  Denies that he has missed any of these doses. He takes his blood pressure medicine daily.    He has occasional eye twitching over the past month.   4/10 occipital headache this morning and yesterday. Improving.   Denies hand numbness currently    Review of Systems   Constitutional: Negative for chills, diaphoresis and fever.   HENT: Negative for congestion, ear pain, rhinorrhea and sore throat.    Respiratory: Negative for cough, shortness of breath and wheezing.    Cardiovascular: Negative for chest pain and palpitations.   Gastrointestinal: Negative for abdominal pain, blood in stool, constipation, diarrhea, nausea and vomiting.   Genitourinary: Negative for discharge, dysuria, penile pain, penile swelling and testicular pain.   Musculoskeletal: Negative for back pain.       Past Medical History:   Diagnosis Date     Allergic rhinitis      ED (erectile dysfunction)      Family History   Problem Relation Age of Onset     Diabetes Father 50     Diabetes Brother         unknown age of onset     Colon Cancer No family hx of      Colitis No family hx of      Current Outpatient Medications   Medication Sig Dispense Refill     aspirin 81 MG EC tablet Take 81 mg by mouth daily       diclofenac (VOLTAREN) 1 % topical gel Place 4 g onto the skin 4 times daily 100 g 11     fluticasone (FLONASE) 50 MCG/ACT nasal spray SHAKE LQ AND U 1 SPR IEN QD       gabapentin (NEURONTIN) 100 MG capsule Take 1 capsule (100 mg) by mouth 3 times daily 90  capsule 5     hydrochlorothiazide (HYDRODIURIL) 25 MG tablet TAKE ONE TABLET BY MOUTH DAILY 90 tablet 1     multivitamin  with lutein (OCUVITE WITH LTEIN) CAPS per capsule Take 1 capsule by mouth daily 30 capsule 11     omeprazole (PRILOSEC) 20 MG DR capsule Take 1 capsule (20 mg) by mouth daily 90 capsule 0     pantoprazole (PROTONIX) 20 MG EC tablet Take 1 tablet (20 mg) by mouth 2 times daily 60 tablet 0     simvastatin (ZOCOR) 20 MG tablet Take 1 tablet (20 mg) by mouth At Bedtime ++NEED FASTING LABS FOR ADDITIONAL REFILLS++ 90 tablet 0     valACYclovir (VALTREX) 1000 mg tablet TAKE 1 TABLET(1000 MG) BY MOUTH TWICE DAILY FOR 10 DAYS 20 tablet 3     triamterene-HCTZ (DYAZIDE) 37.5-25 MG capsule Take 1 capsule by mouth every morning (Patient not taking: Reported on 6/17/2020) 90 capsule 3     Social History     Tobacco Use     Smoking status: Never Smoker     Smokeless tobacco: Never Used   Substance Use Topics     Alcohol use: Yes     Repeat 138/90     OBJECTIVE  BP (!) 160/92   Pulse 86   Temp 98.7  F (37.1  C) (Tympanic)   Resp 22   Wt 67.7 kg (149 lb 3.2 oz)   SpO2 95%   BMI 26.43 kg/m      Physical Exam  HENT:      Head: Normocephalic and atraumatic.      Right Ear: Tympanic membrane and external ear normal.      Left Ear: Tympanic membrane and external ear normal.      Nose: Nose normal.      Mouth/Throat:      Pharynx: No oropharyngeal exudate.   Eyes:      General: Lids are normal. No scleral icterus.        Right eye: No discharge.         Left eye: No discharge.      Extraocular Movements: Extraocular movements intact.      Conjunctiva/sclera: Conjunctivae normal.      Right eye: No chemosis.     Left eye: No chemosis.     Pupils: Pupils are equal, round, and reactive to light.   Neck:      Musculoskeletal: Neck supple.      Thyroid: No thyromegaly.      Trachea: No tracheal deviation.   Cardiovascular:      Rate and Rhythm: Normal rate and regular rhythm.      Heart sounds: Normal heart sounds. No  murmur. No friction rub. No gallop.    Pulmonary:      Effort: Pulmonary effort is normal. No respiratory distress.      Breath sounds: Normal breath sounds. No stridor. No wheezing or rales.   Chest:      Chest wall: No tenderness.   Musculoskeletal:         General: No tenderness or deformity.   Lymphadenopathy:      Cervical: No cervical adenopathy.   Skin:     General: Skin is warm and dry.      Findings: No erythema or rash.   Neurological:      Mental Status: He is alert and oriented to person, place, and time.      Cranial Nerves: No cranial nerve deficit.   Psychiatric:         Judgment: Judgment normal.           ASSESSMENT:    ICD-10-CM    1. Essential hypertension  I10           PLAN:  Blood pressure somewhat improved on second measurement in the clinic.  I think this is within standard variance for daily blood pressure.  Recommend that he continue to monitor 6/8/2010 measurements over the next couple weeks return to see his primary physician regarding ongoing therapy or modifications in his blood pressure regimen.  The patient indicates understanding of these issues and agrees with the plan.  Shimon Saavedra MD

## 2020-06-17 NOTE — TELEPHONE ENCOUNTER
Reason for Call:  Medication or medication refill:    Do you use a [facility]?  Name of the pharmacy and phone number for the current request:  [id number] - [place], [place] - [address] AT [facility] [address]     Name of the medication requested: hydrochlorothiazide (HYDRODIURIL) 25 MG tablet    Can we leave a detailed message on this number? YES    Phone number patient can be reached at: Home number on file [phone] (home)    Best Time: anytime    Call taken on 6/17/2020 at 9:37 AM by Yasir Flor

## 2020-06-17 NOTE — TELEPHONE ENCOUNTER
Reason for call:  Patient reporting a symptom    Symptom or request: Symptoms    Duration (how long have symptoms been present): on going    Have you been treated for this before? Yes    Additional comments: Pt calling for he has concerns regarding his high blood pressure and would like a call back to discuss further    Phone Number patient can be reached at:  Home number on file [phone] (home)    Best Time:  anytime    Can we leave a detailed message on this number:  YES    Call taken on 6/17/2020 at 9:41 AM by Yasir Flor

## 2020-06-18 RX ORDER — HYDROCHLOROTHIAZIDE 25 MG/1
25 TABLET ORAL DAILY
Qty: 90 TABLET | Refills: 0 | Status: SHIPPED | OUTPATIENT
Start: 2020-06-18 | End: 2020-09-02

## 2020-06-18 NOTE — TELEPHONE ENCOUNTER
Routing refill request to provider for review/approval because:  Labs not current:  Potassium  BP fails protocol.    Payal De Oliveira RN, [facility] Triage

## 2020-06-25 DIAGNOSIS — A04.8 H. PYLORI INFECTION: ICD-10-CM

## 2020-06-29 ENCOUNTER — VIRTUAL VISIT (OUTPATIENT)
Dept: FAMILY MEDICINE | Facility: CLINIC | Age: 60
End: 2020-06-29
Payer: COMMERCIAL

## 2020-06-29 DIAGNOSIS — I10 ESSENTIAL HYPERTENSION: Primary | ICD-10-CM

## 2020-06-29 PROCEDURE — 99214 OFFICE O/P EST MOD 30 MIN: CPT | Mod: 95 | Performed by: NURSE PRACTITIONER

## 2020-06-29 RX ORDER — AMLODIPINE BESYLATE 5 MG/1
5 TABLET ORAL DAILY
Qty: 30 TABLET | Refills: 1 | Status: SHIPPED | OUTPATIENT
Start: 2020-06-29 | End: 2020-09-04

## 2020-06-29 RX ORDER — PANTOPRAZOLE SODIUM 20 MG/1
20 TABLET, DELAYED RELEASE ORAL 2 TIMES DAILY
Qty: 60 TABLET | Refills: 3 | Status: SHIPPED | OUTPATIENT
Start: 2020-06-29 | End: 2020-07-27

## 2020-06-29 NOTE — TELEPHONE ENCOUNTER
"Requested Prescriptions   Pending Prescriptions Disp Refills     pantoprazole (PROTONIX) 20 MG EC tablet 60 tablet 0     Sig: Take 1 tablet (20 mg) by mouth 2 times daily       PPI Protocol Passed - 6/25/2020  9:06 AM        Passed - Not on Clopidogrel (unless Pantoprazole ordered)        Passed - No diagnosis of osteoporosis on record        Passed - Recent (12 mo) or future (30 days) visit within the authorizing provider's specialty     Patient has had an office visit with the authorizing provider or a provider within the authorizing providers department within the previous 12 mos or has a future within next 30 days. See \"Patient Info\" tab in inbasket, or \"Choose Columns\" in Meds & Orders section of the refill encounter.              Passed - Medication is active on med list        Passed - Patient is age 18 or older           Prescription approved per [facility] Refill Protocol.      Katlyn Sylvester RN, BSN, PHN    "

## 2020-06-29 NOTE — PATIENT INSTRUCTIONS
At [facility], we strive to deliver an exceptional experience to you, every time we see you. If you receive a survey, please complete it as we do value your feedback.  If you have MyChart, you can expect to receive results automatically within 24 hours of their completion.  Your provider will send a note interpreting your results as well.   If you do not have MyChart, you should receive your results in about a week by mail.    Your care team:                            Family Medicine Internal Medicine   MD Ky Crawley MD Shantel Branch-Fleming, MD Katya Georgiev PA-C Megan Hill, APRKISHORE Flowers, MD Pediatrics   Conor Marroquin, PAGHANSHYAM Quinteros, MD Theresa Jeter APRN CNP   MD Ronda Angelo MD Deborah Mielke, MD Kim Thein, APRN [facility]      Clinic hours: Monday - Thursday 7 am-7 pm; Fridays 7 am-5 pm.   Urgent care: Monday - Friday 11 am-9 pm; Saturday and Sunday 9 am-5 pm.    Clinic: [phone]       [facility]: Monday - Thursday 8 am - 7 pm; Friday 8 am - 6 pm  [facility]: [phone]     Use [email or web address] for 24/7 diagnosis and treatment of dozens of conditions.

## 2020-06-29 NOTE — PROGRESS NOTES
"Jose Gaines is a 60 year old male who is being evaluated via a billable telephone visit.      The patient has been notified of following:     \"This telephone visit will be conducted via a call between you and your physician/provider. We have found that certain health care needs can be provided without the need for a physical exam.  This service lets us provide the care you need with a short phone conversation.  If a prescription is necessary we can send it directly to your pharmacy.  If lab work is needed we can place an order for that and you can then stop by our lab to have the test done at a later time.    Telephone visits are billed at different rates depending on your insurance coverage. During this emergency period, for some insurers they may be billed the same as an in-person visit.  Please reach out to your insurance provider with any questions.    If during the course of the call the physician/provider feels a telephone visit is not appropriate, you will not be charged for this service.\"    Patient has given verbal consent for Telephone visit?  Yes    What phone number would you like to be contacted at? [phone]    How would you like to obtain your AVS? Mail a copy    Subjective     Jose Gaines is a 60 year old male who presents via phone visit today for the following health issues:    HPI  Hypertension Follow-up      Do you check your blood pressure regularly outside of the clinic? Yes     Are you following a low salt diet? No    Are your blood pressures ever more than 140 on the top number (systolic) OR more   than 90 on the bottom number (diastolic), for example 140/90? Yes        How many servings of fruits and vegetables do you eat daily?  2-3    On average, how many sweetened beverages do you drink each day (Examples: soda, juice, sweet tea, etc.  Do NOT count diet or artificially sweetened beverages)?   0    How many days per week do you exercise enough to make your heart beat faster? 3 " or less    How many minutes a day do you exercise enough to make your heart beat faster? 9 or less    How many days per week do you miss taking your medication? 0         No chest pain or shortness of breath  No LE edema  Getting headaches related to high BP  No palpitations or lightheadedness    Patient Active Problem List   Diagnosis     Seasonal allergic rhinitis     CARDIOVASCULAR SCREENING; LDL GOAL LESS THAN 130     Chronic hepatitis B (H)     Essential hypertension     Hyperlipidemia     Internal hemorrhoid     Language barrier     Oral ulcer     Overweight     Pain of meniscus of right knee     Past Surgical History:   Procedure Laterality Date     SURGICAL HISTORY OF -   1/31/05 & 8/05    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       Social History     Tobacco Use     Smoking status: Never Smoker     Smokeless tobacco: Never Used   Substance Use Topics     Alcohol use: Yes     Family History   Problem Relation Age of Onset     Diabetes Father 50     Diabetes Brother         unknown age of onset     Colon Cancer No family hx of      Colitis No family hx of          Current Outpatient Medications   Medication Sig Dispense Refill     amLODIPine (NORVASC) 5 MG tablet Take 1 tablet (5 mg) by mouth daily 30 tablet 1     aspirin 81 MG EC tablet Take 81 mg by mouth daily       diclofenac (VOLTAREN) 1 % topical gel Place 4 g onto the skin 4 times daily 100 g 11     fluticasone (FLONASE) 50 MCG/ACT nasal spray SHAKE LQ AND U 1 SPR IEN QD       gabapentin (NEURONTIN) 100 MG capsule Take 1 capsule (100 mg) by mouth 3 times daily 90 capsule 5     hydrochlorothiazide (HYDRODIURIL) 25 MG tablet Take 1 tablet (25 mg) by mouth daily 90 tablet 0     multivitamin  with lutein (OCUVITE WITH LTEIN) CAPS per capsule Take 1 capsule by mouth daily 30 capsule 11     omeprazole (PRILOSEC) 20 MG DR capsule Take 1 capsule (20 mg) by mouth daily 90 capsule 0     pantoprazole (PROTONIX) 20 MG EC tablet Take 1 tablet (20 mg) by mouth 2 times daily 60  tablet 3     simvastatin (ZOCOR) 20 MG tablet Take 1 tablet (20 mg) by mouth At Bedtime ++NEED FASTING LABS FOR ADDITIONAL REFILLS++ 90 tablet 0     valACYclovir (VALTREX) 1000 mg tablet TAKE 1 TABLET(1000 MG) BY MOUTH TWICE DAILY FOR 10 DAYS 20 tablet 3     No Known Allergies    Reviewed and updated as needed this visit by Provider         Review of Systems   Constitutional, HEENT, cardiovascular, pulmonary, gi and gu systems are negative, except as otherwise noted.       Objective   Reported vitals:  There were no vitals taken for this visit.   healthy, alert and no distress  PSYCH: Alert and oriented times 3; coherent speech, normal   rate and volume, able to articulate logical thoughts, able   to abstract reason, no tangential thoughts, no hallucinations   or delusions  His affect is normal  RESP: No cough, no audible wheezing, able to talk in full sentences  Remainder of exam unable to be completed due to telephone visits    Diagnostic Test Results:  Labs reviewed in Epic        Assessment/Plan:  1. Essential hypertension  Uncontrolled. Adding norvasc to hydrochlorothiazide. Needs potassium recheck (low in April).  - amLODIPine (NORVASC) 5 MG tablet; Take 1 tablet (5 mg) by mouth daily  Dispense: 30 tablet; Refill: 1    Return in about 2 months (around 8/29/2020) for BP Recheck, Lab Work. Then 3 months with primary care provider for med check.    The benefits, risks and potential side effects were discussed in detail. Black box warnings discussed as relevant. All patient questions were answered. The patient was instructed to follow up immediately if any adverse reactions develop.    Return precautions discussed, including when to seek urgent/emergent care.    Patient verbalizes understanding and agrees with plan of care.         Phone call duration:  14 minutes     used for entire visit.    YESIKA Hightower CNP

## 2020-07-27 ENCOUNTER — VIRTUAL VISIT (OUTPATIENT)
Dept: FAMILY MEDICINE | Facility: CLINIC | Age: 60
End: 2020-07-27
Payer: COMMERCIAL

## 2020-07-27 VITALS — SYSTOLIC BLOOD PRESSURE: 140 MMHG | DIASTOLIC BLOOD PRESSURE: 87 MMHG

## 2020-07-27 DIAGNOSIS — R76.8 HSV-2 SEROPOSITIVE: ICD-10-CM

## 2020-07-27 PROCEDURE — 99213 OFFICE O/P EST LOW 20 MIN: CPT | Mod: 95 | Performed by: INTERNAL MEDICINE

## 2020-07-27 RX ORDER — VALACYCLOVIR HYDROCHLORIDE 1 G/1
TABLET, FILM COATED ORAL
Qty: 20 TABLET | Refills: 11 | Status: SHIPPED | OUTPATIENT
Start: 2020-07-27 | End: 2020-09-29 | Stop reason: DRUGHIGH

## 2020-07-27 ASSESSMENT — PAIN SCALES - GENERAL: PAINLEVEL: NO PAIN (0)

## 2020-07-27 NOTE — PROGRESS NOTES
"Jose Gaines is a 60 year old male who is being evaluated via a billable telephone visit.      The patient has been notified of following:     \"This telephone visit will be conducted via a call between you and your physician/provider. We have found that certain health care needs can be provided without the need for a physical exam.  This service lets us provide the care you need with a short phone conversation.  If a prescription is necessary we can send it directly to your pharmacy.  If lab work is needed we can place an order for that and you can then stop by our lab to have the test done at a later time.    Telephone visits are billed at different rates depending on your insurance coverage. During this emergency period, for some insurers they may be billed the same as an in-person visit.  Please reach out to your insurance provider with any questions.    If during the course of the call the physician/provider feels a telephone visit is not appropriate, you will not be charged for this service.\"    Patient has given verbal consent for Telephone visit?  Yes    What phone number would you like to be contacted at? [phone]    How would you like to obtain your AVS? Mail a copy    Subjective     Jose Gaines is a 60 year old male who presents via phone visit today for the following health issues:    HPI    Medication Followup of Valcyclovir    Taking Medication as prescribed: yes    Side Effects:  None    Medication Helping Symptoms:  yes     First attempt at 7:16 AM    Needs refill for Valtrex. It comes back. Some days, BP are high, some days are ok. Checked BP last Thursday, 147/85. Amlodipine is effective.  Denies diarrhea, resolved after treatment for H. Pylori. 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       Social History     Tobacco Use     Smoking status: Never Smoker     Smokeless tobacco: Never Used   Substance Use Topics     Alcohol use: Yes     Family History   Problem Relation Age of Onset     Diabetes Father 50     Diabetes Brother         unknown age of onset     Colon Cancer No family hx of      Colitis No family hx of          No Known Allergies  Recent Labs   Lab Test 04/29/20  0857 03/04/19  0742 02/28/18  1148   * 89 118*   HDL 71 66 93   TRIG 153* 97 74   ALT 56 46  --    CR 0.77 0.76 0.76   GFRESTIMATED >90 >90 >90   GFRESTBLACK >90 >90 >90   POTASSIUM 3.3* 3.3* 3.7      BP Readings from Last 3 Encounters:   07/27/20 (!) 140/87   06/17/20 (!) 160/92   03/13/20 (!) 167/88    Wt Readings from Last 3 Encounters:   06/17/20 67.7 kg (149 lb 3.2 oz)   03/13/20 68.3 kg (150 lb 9.6 oz)   01/27/20 67.9 kg (149 lb 12.8 oz)                    Reviewed and updated as needed this visit by Provider         Review of Systems   CONSTITUTIONAL: NEGATIVE for fever, chills, change in weight  INTEGUMENTARY/SKIN: As above.  EYES: NEGATIVE for vision changes or irritation  ENT/MOUTH: NEGATIVE for ear, mouth and throat problems  RESP: NEGATIVE for significant cough or SOB  CV: NEGATIVE for chest pain, palpitations or peripheral edema  GI: NEGATIVE for nausea, abdominal pain, heartburn, or change in bowel habits  : NEGATIVE for frequency, dysuria, or hematuria  MUSCULOSKELETAL: NEGATIVE for significant arthralgias or myalgia  NEURO: NEGATIVE for weakness, dizziness or paresthesias  ENDOCRINE: NEGATIVE for temperature intolerance, skin/hair changes  HEME: NEGATIVE for bleeding problems  PSYCHIATRIC: NEGATIVE for changes in mood or affect       Objective   Reported  vitals:  BP (!) 140/87      Remainder of exam unable to be completed due to telephone visits    Diagnostic Test Results:  Labs reviewed in Epic        Assessment/Plan:    1. HSV-2 seropositive    - valACYclovir (VALTREX) 1000 mg tablet; TAKE 1 TABLET(1000 MG) BY MOUTH TWICE DAILY FOR 10 DAYS  Dispense: 20 tablet; Refill: 11    Follow-up as needed.    Phone call duration:  8 minutes  Start: 7:20 AM  End: 7:28 AM    Ky Stone MD

## 2020-08-24 DIAGNOSIS — I10 ESSENTIAL HYPERTENSION: ICD-10-CM

## 2020-08-25 RX ORDER — AMLODIPINE BESYLATE 5 MG/1
5 TABLET ORAL DAILY
Qty: 30 TABLET | Refills: 1 | OUTPATIENT
Start: 2020-08-25

## 2020-08-25 NOTE — TELEPHONE ENCOUNTER
Routing refill request to provider for review/approval because:  Labs out of range:  BP    Tahira Gavin RN

## 2020-08-26 NOTE — TELEPHONE ENCOUNTER
Refill denied as patient established care with different provider in [facility].

## 2020-09-29 ENCOUNTER — VIRTUAL VISIT (OUTPATIENT)
Dept: FAMILY MEDICINE | Facility: CLINIC | Age: 60
End: 2020-09-29
Payer: COMMERCIAL

## 2020-09-29 DIAGNOSIS — R76.8 HSV-2 SEROPOSITIVE: ICD-10-CM

## 2020-09-29 DIAGNOSIS — E78.5 HYPERLIPIDEMIA LDL GOAL <100: ICD-10-CM

## 2020-09-29 DIAGNOSIS — I10 HTN, GOAL BELOW 140/80: Primary | ICD-10-CM

## 2020-09-29 DIAGNOSIS — G51.31 CLONIC HEMIFACIAL SPASM, RIGHT: ICD-10-CM

## 2020-09-29 PROCEDURE — 99214 OFFICE O/P EST MOD 30 MIN: CPT | Mod: 95 | Performed by: INTERNAL MEDICINE

## 2020-09-29 RX ORDER — HYDROCHLOROTHIAZIDE 25 MG/1
25 TABLET ORAL DAILY
Qty: 90 TABLET | Refills: 1 | Status: SHIPPED | OUTPATIENT
Start: 2020-09-29 | End: 2021-03-18

## 2020-09-29 RX ORDER — AMLODIPINE BESYLATE 5 MG/1
5 TABLET ORAL DAILY
Qty: 90 TABLET | Refills: 1 | Status: SHIPPED | OUTPATIENT
Start: 2020-09-29 | End: 2021-03-18

## 2020-09-29 RX ORDER — SIMVASTATIN 20 MG
20 TABLET ORAL AT BEDTIME
Qty: 90 TABLET | Refills: 1 | Status: SHIPPED | OUTPATIENT
Start: 2020-09-29 | End: 2021-03-18

## 2020-09-29 RX ORDER — CARBAMAZEPINE 200 MG/1
200 TABLET ORAL 2 TIMES DAILY
Qty: 60 TABLET | Refills: 5 | Status: SHIPPED | OUTPATIENT
Start: 2020-09-29 | End: 2021-03-18

## 2020-09-29 RX ORDER — CARBAMAZEPINE 200 MG/1
200 TABLET ORAL 2 TIMES DAILY
Qty: 60 TABLET | Refills: 5 | Status: SHIPPED | OUTPATIENT
Start: 2020-09-29 | End: 2020-09-29

## 2020-09-29 RX ORDER — VALACYCLOVIR HYDROCHLORIDE 500 MG/1
500 TABLET, FILM COATED ORAL DAILY
Qty: 90 TABLET | Refills: 3 | Status: SHIPPED | OUTPATIENT
Start: 2020-09-29 | End: 2021-03-18

## 2020-09-29 NOTE — PROGRESS NOTES
"oJse Gaines is a 60 year old male who is being evaluated via a billable telephone visit.      The patient has been notified of following:     \"This telephone visit will be conducted via a call between you and your physician/provider. We have found that certain health care needs can be provided without the need for a physical exam.  This service lets us provide the care you need with a short phone conversation.  If a prescription is necessary we can send it directly to your pharmacy.  If lab work is needed we can place an order for that and you can then stop by our lab to have the test done at a later time.    Telephone visits are billed at different rates depending on your insurance coverage. During this emergency period, for some insurers they may be billed the same as an in-person visit.  Please reach out to your insurance provider with any questions.    If during the course of the call the physician/provider feels a telephone visit is not appropriate, you will not be charged for this service.\"    Patient has given verbal consent for Telephone visit?  No    What phone number would you like to be contacted at? [phone]    How would you like to obtain your AVS? Mail a copy    Subjective     Jose Gaines is a 60 year old male who presents via phone visit today for the following health issues:    HPI       Hypertension Follow-up      Outpatient blood pressures monitoring: no    Low Salt Diet: no    Adverse effects: none    Compliance: good    Secondary causes: none    Chronic kidney disease: no    Hyperthyroidism: no    Anxiety: no    Decongestants: no    Substance abuse: none    Diabetes: no    Ischemic heart disease: no    Stroke: no    Hyperlipidemia: yes         Review of Systems   CONSTITUTIONAL: NEGATIVE for fever, chills, change in weight  INTEGUMENTARY/SKIN: NEGATIVE for worrisome rashes, moles or lesions  EYES: NEGATIVE for vision changes or irritation  ENT/MOUTH: NEGATIVE for ear, mouth and throat " problems  RESP: NEGATIVE for significant cough or SOB  CV: NEGATIVE for chest pain, palpitations or peripheral edema  GI: NEGATIVE for nausea, abdominal pain, heartburn, or change in bowel habits  : NEGATIVE for frequency, dysuria, or hematuria  MUSCULOSKELETAL: NEGATIVE for significant arthralgias or myalgia  NEURO: POSITIVE for clonic hemifacial spasm of muscle at the right side of face.  ENDOCRINE: NEGATIVE for temperature intolerance, skin/hair changes  HEME: NEGATIVE for bleeding problems  PSYCHIATRIC: NEGATIVE for changes in mood or affect       Objective    Vitals:  No vitals were obtained today due to virtual visit.    Remainder of exam unable to be completed due to telephone visits    DIAGNOSTICS  Epic reviewed. Labs done last month at an [facility]        Assessment/Plan:  1. HTN, goal below 140/80    - hydrochlorothiazide (HYDRODIURIL) 25 MG tablet; Take 1 tablet (25 mg) by mouth daily  Dispense: 90 tablet; Refill: 1  - amLODIPine (NORVASC) 5 MG tablet; Take 1 tablet (5 mg) by mouth daily  Dispense: 90 tablet; Refill: 1    2. Hyperlipidemia LDL goal <100    - simvastatin (ZOCOR) 20 MG tablet; Take 1 tablet (20 mg) by mouth At Bedtime  Dispense: 90 tablet; Refill: 1    3. Clonic hemifacial spasm, right    - carBAMazepine (TEGRETOL) 200 MG tablet; Take 1 tablet (200 mg) by mouth 2 times daily  Dispense: 60 tablet; Refill: 5    4. HSV-2 seropositive    - valACYclovir (VALTREX) 500 MG tablet; Take 1 tablet (500 mg) by mouth daily  Dispense: 90 tablet; Refill: 3      Phone call duration:  16 minutes  Start: 2:00 PM  End: 2:16 PM    Follow-up in 6 months.    Ky Stone MD  Internal Medicine

## 2020-12-10 NOTE — TELEPHONE ENCOUNTER
"\"I have stomach diarrhea.\" Patient took Imodium and Pepto. In the morning \"stomach makes noise.\" Symptoms starting 1 week ago with 1-2 diarrhea stools per day. Patient denies vomiting. Reporting stomach pain improves after passing diarrhea stool. Afebrile.     Per guidelines advised to be seen with in 2-3 days. Caller verbalized understanding. Denies further questions.    Richa Hernandez RN  [facility]        Reason for Disposition    [1] MILD (e.g., 1-3 or more stools than normal in past 24 hours) diarrhea without known cause AND [2] present >  7 days    Additional Information    Negative: Shock suspected (e.g., cold/pale/clammy skin, too weak to stand, low BP, rapid pulse)    Negative: Difficult to awaken or acting confused  (e.g., disoriented, slurred speech)    Negative: Sounds like a life-threatening emergency to the triager    Negative: Vomiting also present and worse than the diarrhea    Negative: [1] Blood in stool AND [2] without diarrhea    Negative: [1] SEVERE abdominal pain (e.g., excruciating) AND [2] present > 1 hour    Negative: [1] SEVERE abdominal pain AND [2] age > 60    Negative: [1] Blood in the stool AND [2] moderate or large amount of blood    Negative: Black or tarry bowel movements  (Exception: chronic-unchanged  black-grey bowel movements AND is taking iron pills or Pepto-bismol)    Negative: [1] Drinking very little AND [2] dehydration suspected (e.g., no urine > 12 hours, very dry mouth, very lightheaded)    Negative: Patient sounds very sick or weak to the triager    Negative: [1] SEVERE diarrhea (e.g., 7 or more times / day more than normal) AND [2]  age > 60 years    Negative: [1] Constant abdominal pain AND [2] present > 2 hours    Negative: [1] Fever > 103 F (39.4 C) AND [2] not able to get the fever down using Fever Care Advice    Negative: [1] SEVERE diarrhea (e.g., 7 or more times / day more than normal) AND [2] present > 24 hours (1 day)    Negative: [1] MODERATE " diarrhea (e.g., 4-6 times / day more than normal) AND [2] present > 48 hours (2 days)    Negative: [1] MODERATE diarrhea (e.g., 4-6 times / day more than normal) AND [2] age > 70 years    Negative: Fever > 101 F (38.3 C)    Negative: Fever present > 3 days (72 hours)    Negative: Abdominal pain  (Exception: Pain clears with each passage of diarrhea stool)    Negative: [1] Blood in the stool AND [2] small amount of blood   (Exception: only on toilet paper. Reason: diarrhea can cause rectal irritation with blood on wiping)    Negative: [1] Mucus or pus in stool AND [2] present > 2 days AND [3] diarrhea is more than mild    Negative: [1] Recent antibiotic therapy (i.e., within last 2 months) AND [2] > 3 days since antibiotic was stopped    Negative: Weak immune system (e.g., HIV positive, cancer chemo, splenectomy, organ transplant, chronic steroids)    Negative: Tube feedings (e.g., nasogastric, g-tube, j-tube)    Negative: Travel to a foreign country in past month    Protocols used: DIARRHEA-ADULT-       Spine appears normal, range of motion is not limited, no muscle or joint tenderness. bilateral intention tremors of hands

## 2021-02-18 ENCOUNTER — PATIENT OUTREACH (OUTPATIENT)
Dept: FAMILY MEDICINE | Facility: CLINIC | Age: 61
End: 2021-02-18

## 2021-02-18 NOTE — TELEPHONE ENCOUNTER
Panel Management Review   One phone call and send letter if unable to reach them or IPDIAhart message and send letter if not read after 2 weeks (You will get a message to your inbasket)      BP Readings from Last 1 Encounters:   07/27/20 (!) 140/87        Health Maintenance Due   Topic Date Due     ADVANCE CARE PLANNING  1960     HEPATITIS C SCREENING  06/06/1978     ZOSTER IMMUNIZATION (1 of 2) 06/06/2010     PREVENTIVE CARE VISIT  03/04/2020     INFLUENZA VACCINE (1) 09/01/2020     PHQ-2  01/01/2021        Fail List measure: [unfilled]            Patient is due/failing the following:   BP CHECK    Action needed:   Patient needs office visit for blood pressure follow up.    Type of outreach:    BK  to schedule blood pressure follow up.    Questions for provider review:    None                                                                                       Chart routed to Care Team .                                            Lashay Moreland, CMA

## 2021-03-18 ENCOUNTER — OFFICE VISIT (OUTPATIENT)
Dept: FAMILY MEDICINE | Facility: CLINIC | Age: 61
End: 2021-03-18
Payer: COMMERCIAL

## 2021-03-18 VITALS
OXYGEN SATURATION: 95 % | DIASTOLIC BLOOD PRESSURE: 87 MMHG | HEART RATE: 77 BPM | HEIGHT: 63 IN | SYSTOLIC BLOOD PRESSURE: 142 MMHG | BODY MASS INDEX: 26.05 KG/M2 | WEIGHT: 147 LBS

## 2021-03-18 DIAGNOSIS — Z00.00 ROUTINE GENERAL MEDICAL EXAMINATION AT A HEALTH CARE FACILITY: Primary | ICD-10-CM

## 2021-03-18 DIAGNOSIS — J02.9 ACUTE PHARYNGITIS, UNSPECIFIED ETIOLOGY: ICD-10-CM

## 2021-03-18 DIAGNOSIS — Z71.89 ADVICE GIVEN ABOUT COVID-19 VIRUS INFECTION: ICD-10-CM

## 2021-03-18 DIAGNOSIS — I10 HTN, GOAL BELOW 140/80: ICD-10-CM

## 2021-03-18 DIAGNOSIS — R76.8 HSV-2 SEROPOSITIVE: ICD-10-CM

## 2021-03-18 DIAGNOSIS — E78.5 HYPERLIPIDEMIA LDL GOAL <100: ICD-10-CM

## 2021-03-18 LAB
ALBUMIN SERPL-MCNC: 4.1 G/DL (ref 3.4–5)
ALP SERPL-CCNC: 68 U/L (ref 40–150)
ALT SERPL W P-5'-P-CCNC: 155 U/L (ref 0–70)
ANION GAP SERPL CALCULATED.3IONS-SCNC: 4 MMOL/L (ref 3–14)
AST SERPL W P-5'-P-CCNC: 71 U/L (ref 0–45)
BASOPHILS # BLD AUTO: 0 10E9/L (ref 0–0.2)
BASOPHILS NFR BLD AUTO: 0.8 %
BILIRUB SERPL-MCNC: 0.6 MG/DL (ref 0.2–1.3)
BUN SERPL-MCNC: 15 MG/DL (ref 7–30)
CALCIUM SERPL-MCNC: 9.2 MG/DL (ref 8.5–10.1)
CHLORIDE SERPL-SCNC: 97 MMOL/L (ref 94–109)
CHOLEST SERPL-MCNC: 261 MG/DL
CO2 SERPL-SCNC: 33 MMOL/L (ref 20–32)
CREAT SERPL-MCNC: 0.87 MG/DL (ref 0.66–1.25)
CREAT UR-MCNC: 66 MG/DL
DEPRECATED S PYO AG THROAT QL EIA: NEGATIVE
DIFFERENTIAL METHOD BLD: ABNORMAL
EOSINOPHIL # BLD AUTO: 0.2 10E9/L (ref 0–0.7)
EOSINOPHIL NFR BLD AUTO: 4.6 %
ERYTHROCYTE [DISTWIDTH] IN BLOOD BY AUTOMATED COUNT: 13.7 % (ref 10–15)
GFR SERPL CREATININE-BSD FRML MDRD: >90 ML/MIN/{1.73_M2}
GLUCOSE SERPL-MCNC: 101 MG/DL (ref 70–99)
HCT VFR BLD AUTO: 49.8 % (ref 40–53)
HDLC SERPL-MCNC: 74 MG/DL
HGB BLD-MCNC: 16.5 G/DL (ref 13.3–17.7)
LDLC SERPL CALC-MCNC: 154 MG/DL
LYMPHOCYTES # BLD AUTO: 1.5 10E9/L (ref 0.8–5.3)
LYMPHOCYTES NFR BLD AUTO: 30.2 %
MCH RBC QN AUTO: 27.9 PG (ref 26.5–33)
MCHC RBC AUTO-ENTMCNC: 33.1 G/DL (ref 31.5–36.5)
MCV RBC AUTO: 84 FL (ref 78–100)
MICROALBUMIN UR-MCNC: 10 MG/L
MICROALBUMIN/CREAT UR: 15.19 MG/G CR (ref 0–17)
MONOCYTES # BLD AUTO: 0.5 10E9/L (ref 0–1.3)
MONOCYTES NFR BLD AUTO: 9.1 %
NEUTROPHILS # BLD AUTO: 2.8 10E9/L (ref 1.6–8.3)
NEUTROPHILS NFR BLD AUTO: 55.3 %
NONHDLC SERPL-MCNC: 187 MG/DL
PLATELET # BLD AUTO: 241 10E9/L (ref 150–450)
POTASSIUM SERPL-SCNC: 3.2 MMOL/L (ref 3.4–5.3)
PROT SERPL-MCNC: 8.9 G/DL (ref 6.8–8.8)
RBC # BLD AUTO: 5.91 10E12/L (ref 4.4–5.9)
SODIUM SERPL-SCNC: 134 MMOL/L (ref 133–144)
SPECIMEN SOURCE: NORMAL
SPECIMEN SOURCE: NORMAL
STREP GROUP A PCR: NOT DETECTED
TRIGL SERPL-MCNC: 167 MG/DL
WBC # BLD AUTO: 5 10E9/L (ref 4–11)

## 2021-03-18 PROCEDURE — 86769 SARS-COV-2 COVID-19 ANTIBODY: CPT | Performed by: INTERNAL MEDICINE

## 2021-03-18 PROCEDURE — 82043 UR ALBUMIN QUANTITATIVE: CPT | Performed by: INTERNAL MEDICINE

## 2021-03-18 PROCEDURE — 99396 PREV VISIT EST AGE 40-64: CPT | Performed by: INTERNAL MEDICINE

## 2021-03-18 PROCEDURE — 80061 LIPID PANEL: CPT | Performed by: INTERNAL MEDICINE

## 2021-03-18 PROCEDURE — 99N1174 PR STATISTIC STREP A RAPID: Performed by: INTERNAL MEDICINE

## 2021-03-18 PROCEDURE — 36415 COLL VENOUS BLD VENIPUNCTURE: CPT | Performed by: INTERNAL MEDICINE

## 2021-03-18 PROCEDURE — 85025 COMPLETE CBC W/AUTO DIFF WBC: CPT | Performed by: INTERNAL MEDICINE

## 2021-03-18 PROCEDURE — 80053 COMPREHEN METABOLIC PANEL: CPT | Performed by: INTERNAL MEDICINE

## 2021-03-18 PROCEDURE — 99214 OFFICE O/P EST MOD 30 MIN: CPT | Mod: 25 | Performed by: INTERNAL MEDICINE

## 2021-03-18 PROCEDURE — 87651 STREP A DNA AMP PROBE: CPT | Performed by: INTERNAL MEDICINE

## 2021-03-18 RX ORDER — VALACYCLOVIR HYDROCHLORIDE 500 MG/1
500 TABLET, FILM COATED ORAL DAILY
Qty: 90 TABLET | Refills: 3 | Status: SHIPPED | OUTPATIENT
Start: 2021-03-18 | End: 2024-06-07

## 2021-03-18 RX ORDER — HYDROCHLOROTHIAZIDE 25 MG/1
25 TABLET ORAL DAILY
Qty: 90 TABLET | Refills: 1 | Status: SHIPPED | OUTPATIENT
Start: 2021-03-18 | End: 2021-03-24

## 2021-03-18 RX ORDER — SIMVASTATIN 20 MG
20 TABLET ORAL AT BEDTIME
Qty: 90 TABLET | Refills: 3 | Status: SHIPPED | OUTPATIENT
Start: 2021-03-18 | End: 2021-03-24

## 2021-03-18 RX ORDER — CEFDINIR 300 MG/1
300 CAPSULE ORAL 2 TIMES DAILY
Qty: 14 CAPSULE | Refills: 1 | Status: SHIPPED | OUTPATIENT
Start: 2021-03-18 | End: 2021-03-25

## 2021-03-18 RX ORDER — AMLODIPINE BESYLATE 5 MG/1
5 TABLET ORAL DAILY
Qty: 90 TABLET | Refills: 1 | Status: SHIPPED | OUTPATIENT
Start: 2021-03-18 | End: 2021-10-13

## 2021-03-18 RX ORDER — AZITHROMYCIN 250 MG/1
TABLET, FILM COATED ORAL
Qty: 6 TABLET | Refills: 1 | Status: SHIPPED | OUTPATIENT
Start: 2021-03-18 | End: 2021-03-18

## 2021-03-18 ASSESSMENT — PAIN SCALES - GENERAL: PAINLEVEL: NO PAIN (0)

## 2021-03-18 ASSESSMENT — MIFFLIN-ST. JEOR: SCORE: 1371.92

## 2021-03-18 NOTE — PROGRESS NOTES
3  SUBJECTIVE:   CC: Jose Gaines is an 60 year old male who presents for preventive health visit.     Patient has been advised of split billing requirements and indicates understanding: Yes  Healthy Habits:  Do you get at least three servings of calcium containing foods daily (dairy, green leafy vegetables, etc.)? yes  Amount of exercise or daily activities, outside of work: 2 day(s) per week  Problems taking medications regularly No  Medication side effects: No  Have you had an eye exam in the past two years? yes  Do you see a dentist twice per year? yes  Do you have sleep apnea, excessive snoring or daytime drowsiness?no    Today's PHQ-2 Score:   PHQ-2 ( 1999 Pfizer) 3/18/2021 5/19/2020   Q1: Little interest or pleasure in doing things 0 0   Q2: Feeling down, depressed or hopeless 0 0   PHQ-2 Score 0 0       Abuse: Current or Past(Physical, Sexual or Emotional)- NO  Do you feel safe in your environment? YES    Have you ever done Advance Care Planning? (For example, a Health Directive, POLST, or a discussion with a medical provider or your loved ones about your wishes): No, advance care planning information given to patient to review.  Patient plans to discuss their wishes with loved ones or provider.      Social History     Tobacco Use     Smoking status: Never Smoker     Smokeless tobacco: Never Used   Substance Use Topics     Alcohol use: Yes     If you drink alcohol do you typically have >3 drinks per day or >7 drinks per week? No                      Last PSA:   PSA   Date Value Ref Range Status   03/04/2019 0.67 0 - 4 ug/L Final     Comment:     Assay Method:  Chemiluminescence using Siemens Vista analyzer       Reviewed orders with patient. Reviewed health maintenance and updated orders accordingly - Yes  Labs reviewed in EPIC  BP Readings from Last 3 Encounters:   03/18/21 (!) 142/87   07/27/20 (!) 140/87   06/17/20 (!) 160/92    Wt Readings from Last 3 Encounters:   03/18/21 66.7 kg (147 lb)    06/17/20 67.7 kg (149 lb 3.2 oz)   03/13/20 68.3 kg (150 lb 9.6 oz)                  Patient Active Problem List   Diagnosis     Seasonal allergic rhinitis     CARDIOVASCULAR SCREENING; LDL GOAL LESS THAN 130     Chronic hepatitis B (H)     HTN, goal below 140/80     Hyperlipidemia LDL goal <100     Internal hemorrhoid     Language barrier     Oral ulcer     Overweight     Pain of meniscus of right knee     Clonic hemifacial spasm, right     HSV-2 seropositive     Past Surgical History:   Procedure Laterality Date     SURGICAL HISTORY OF -   1/31/05 & 8/05    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       Social History     Tobacco Use     Smoking status: Never Smoker     Smokeless tobacco: Never Used   Substance Use Topics     Alcohol use: Yes     Family History   Problem Relation Age of Onset     Diabetes Father 50     Diabetes Brother         unknown age of onset     Colon Cancer No family hx of      Colitis No family hx of          No Known Allergies  Recent Labs   Lab Test 03/18/21  0958 04/29/20  0857 03/04/19  0742   * 100* 89   HDL 74 71 66   TRIG 167* 153* 97   * 56 46   CR 0.87 0.77 0.76   GFRESTIMATED >90 >90 >90   GFRESTBLACK >90 >90 >90   POTASSIUM 3.2* 3.3* 3.3*        Reviewed and updated as needed this visit by clinical staff  Tobacco  Allergies  Meds  Problems  Med Hx  Surg Hx  Fam Hx  Soc Hx          Reviewed and updated as needed this visit by Provider                    ROS:  CONSTITUTIONAL: NEGATIVE for fever, chills, change in weight  INTEGUMENTARY/SKIN: NEGATIVE for worrisome rashes, moles or lesions  EYES: NEGATIVE for vision changes or irritation  ENT: NEGATIVE for ear, mouth and throat problems  RESP: NEGATIVE for significant cough or SOB  CV: NEGATIVE for chest pain, palpitations or peripheral edema  GI: NEGATIVE for nausea, abdominal pain, heartburn, or change in bowel habits   male: negative for dysuria, hematuria, decreased urinary stream, erectile dysfunction, urethral  "discharge  MUSCULOSKELETAL: NEGATIVE for significant arthralgias or myalgia  NEURO: NEGATIVE for weakness, dizziness or paresthesias  PSYCHIATRIC: NEGATIVE for changes in mood or affect    OBJECTIVE:   BP (!) 148/92 (BP Location: Left arm, Patient Position: Chair, Cuff Size: Adult Regular)   Pulse 77   Ht 1.6 m (5' 3\")   Wt 66.7 kg (147 lb)   SpO2 95%   BMI 26.04 kg/m    EXAM:  GENERAL: healthy, alert and no distress  EYES: Eyes grossly normal to inspection, PERRL and conjunctivae and sclerae normal  HENT: ear canals and TM's normal, nose and mouth without ulcers or lesions  NECK: no adenopathy, no asymmetry, masses, or scars and thyroid normal to palpation  RESP: lungs clear to auscultation - no rales, rhonchi or wheezes  CV: regular rate and rhythm, normal S1 S2, no S3 or S4, no murmur, click or rub, no peripheral edema and peripheral pulses strong  ABDOMEN: soft, nontender, no hepatosplenomegaly, no masses and bowel sounds normal  MS: no gross musculoskeletal defects noted, no edema  SKIN: no suspicious lesions or rashes  NEURO: Normal strength and tone, mentation intact and speech normal  PSYCH: mentation appears normal, affect normal/bright    Diagnostic Test Results:  Results for orders placed or performed in visit on 03/18/21   CBC with platelets and differential     Status: Abnormal   Result Value Ref Range    WBC 5.0 4.0 - 11.0 10e9/L    RBC Count 5.91 (H) 4.4 - 5.9 10e12/L    Hemoglobin 16.5 13.3 - 17.7 g/dL    Hematocrit 49.8 40.0 - 53.0 %    MCV 84 78 - 100 fl    MCH 27.9 26.5 - 33.0 pg    MCHC 33.1 31.5 - 36.5 g/dL    RDW 13.7 10.0 - 15.0 %    Platelet Count 241 150 - 450 10e9/L    % Neutrophils 55.3 %    % Lymphocytes 30.2 %    % Monocytes 9.1 %    % Eosinophils 4.6 %    % Basophils 0.8 %    Absolute Neutrophil 2.8 1.6 - 8.3 10e9/L    Absolute Lymphocytes 1.5 0.8 - 5.3 10e9/L    Absolute Monocytes 0.5 0.0 - 1.3 10e9/L    Absolute Eosinophils 0.2 0.0 - 0.7 10e9/L    Absolute Basophils 0.0 0.0 - 0.2 " 10e9/L    Diff Method Automated Method    Comprehensive metabolic panel (BMP + Alb, Alk Phos, ALT, AST, Total. Bili, TP)     Status: Abnormal   Result Value Ref Range    Sodium 134 133 - 144 mmol/L    Potassium 3.2 (L) 3.4 - 5.3 mmol/L    Chloride 97 94 - 109 mmol/L    Carbon Dioxide 33 (H) 20 - 32 mmol/L    Anion Gap 4 3 - 14 mmol/L    Glucose 101 (H) 70 - 99 mg/dL    Urea Nitrogen 15 7 - 30 mg/dL    Creatinine 0.87 0.66 - 1.25 mg/dL    GFR Estimate >90 >60 mL/min/[1.73_m2]    GFR Estimate If Black >90 >60 mL/min/[1.73_m2]    Calcium 9.2 8.5 - 10.1 mg/dL    Bilirubin Total 0.6 0.2 - 1.3 mg/dL    Albumin 4.1 3.4 - 5.0 g/dL    Protein Total 8.9 (H) 6.8 - 8.8 g/dL    Alkaline Phosphatase 68 40 - 150 U/L     (H) 0 - 70 U/L    AST 71 (H) 0 - 45 U/L   Albumin Random Urine Quantitative with Creat Ratio     Status: None   Result Value Ref Range    Creatinine Urine 66 mg/dL    Albumin Urine mg/L 10 mg/L    Albumin Urine mg/g Cr 15.19 0 - 17 mg/g Cr   Lipid panel reflex to direct LDL Non-fasting     Status: Abnormal   Result Value Ref Range    Cholesterol 261 (H) <200 mg/dL    Triglycerides 167 (H) <150 mg/dL    HDL Cholesterol 74 >39 mg/dL    LDL Cholesterol Calculated 154 (H) <100 mg/dL    Non HDL Cholesterol 187 (H) <130 mg/dL   COVID-19 Virus (Coronavirus) Antibody Screen, IgG     Status: None   Result Value Ref Range    COVID-19 Antibody, IgG Negative NEG^Negative    COVID-19 Antibody, IgG Comment See note    Streptococcus A Rapid Scr w Reflx to PCR     Status: None    Specimen: Throat   Result Value Ref Range    Strep Specimen Description Throat     Streptococcus Group A Rapid Screen Negative NEG^Negative   Group A Streptococcus PCR Throat Swab     Status: None    Specimen: Throat   Result Value Ref Range    Specimen Description Throat     Strep Group A PCR Not Detected NDET^Not Detected       ASSESSMENT/PLAN:   1. Routine general medical examination at a health care facility  - CBC with platelets and  "differential  - Comprehensive metabolic panel (BMP + Alb, Alk Phos, ALT, AST, Total. Bili, TP)  - Group A Streptococcus PCR Throat Swab    2. HTN, goal below 140/80  - amLODIPine (NORVASC) 5 MG tablet; Take 1 tablet (5 mg) by mouth daily  Dispense: 90 tablet; Refill: 1  - Comprehensive metabolic panel (BMP + Alb, Alk Phos, ALT, AST, Total. Bili, TP)  - Albumin Random Urine Quantitative with Creat Ratio  - triamterene-HCTZ (DYAZIDE) 37.5-25 MG capsule; Take 1 capsule by mouth every morning  Dispense: 90 capsule; Refill: 1    3. Hyperlipidemia LDL goal <100  - Comprehensive metabolic panel (BMP + Alb, Alk Phos, ALT, AST, Total. Bili, TP)  - Lipid panel reflex to direct LDL Non-fasting  - atorvastatin (LIPITOR) 10 MG tablet; Take 1 tablet (10 mg) by mouth daily  Dispense: 90 tablet; Refill: 3    4. HSV-2 seropositive  - valACYclovir (VALTREX) 500 MG tablet; Take 1 tablet (500 mg) by mouth daily  Dispense: 90 tablet; Refill: 3    5. Advice given about COVID-19 virus infection  - COVID-19 Virus (Coronavirus) Antibody Screen, IgG    6. Acute pharyngitis, unspecified etiology  - Streptococcus A Rapid Scr w Reflx to PCR  - cefdinir (OMNICEF) 300 MG capsule; Take 1 capsule (300 mg) by mouth 2 times daily for 7 days  Dispense: 14 capsule; Refill: 1    Patient has been advised of split billing requirements and indicates understanding: Yes  COUNSELING:  Special attention given to:        Regular exercise       Healthy diet/nutrition       The 10-year ASCVD risk score (Vanderwagen DC Jr., et al., 2013) is: 11.5%    Values used to calculate the score:      Age: 60 years      Sex: Male      Is Non- : No      Diabetic: No      Tobacco smoker: No      Systolic Blood Pressure: 142 mmHg      Is BP treated: Yes      HDL Cholesterol: 74 mg/dL      Total Cholesterol: 261 mg/dL    Estimated body mass index is 26.04 kg/m  as calculated from the following:    Height as of this encounter: 1.6 m (5' 3\").    Weight as of this " encounter: 66.7 kg (147 lb).    Weight management plan: diet and exercise    He reports that he has never smoked. He has never used smokeless tobacco.      Counseling Resources:  ATP IV Guidelines  Pooled Cohorts Equation Calculator  FRAX Risk Assessment  ICSI Preventive Guidelines  Dietary Guidelines for Americans, 2010  USDA's MyPlate  ASA Prophylaxis  Lung CA Screening    Ky Stone MD  [facility]

## 2021-03-18 NOTE — PATIENT INSTRUCTIONS
At [facility], we strive to deliver an exceptional experience to you, every time we see you. If you receive a survey, please complete it as we do value your feedback.  If you have MyChart, you can expect to receive results automatically within 24 hours of their completion.  Your provider will send a note interpreting your results as well.   If you do not have MyChart, you should receive your results in about a week by mail.    Your care team:                            Family Medicine Internal Medicine   MD Ky Crawley MD Shantel Branch-Fleming, MD Srinivasa Vaka, MD Katya Belousova, PANicoleC  Minda Ashford, APRN CNP    Siddharth Flowers, MD Pediatrics   Conor Marroquin, PAGHANSHYAM Quinteros, CNP MD Theresa Mcgregor APRN CNP   MD Ronda Angelo MD Deborah Mielke, MD Greer Lyons, APRN [facility]      Clinic hours: Monday - Thursday 7 am-6 pm; Fridays 7 am-5 pm.   Urgent care: Monday - Friday 11 am-9 pm; Saturday and Sunday 9 am-5 pm.    Clinic: [phone]       [facility]: Monday - Thursday 8 am - 7 pm; Friday 8 am - 6 pm  [facility]: [phone]     Use [email or web address] for 24/7 diagnosis and treatment of dozens of conditions.    Preventive Health Recommendations  Male Ages 50 - 64    Yearly exam:             See your health care provider every year in order to  o   Review health changes.   o   Discuss preventive care.    o   Review your medicines if your doctor has prescribed any.     Have a cholesterol test every 5 years, or more frequently if you are at risk for high cholesterol/heart disease.     Have a diabetes test (fasting glucose) every three years. If you are at risk for diabetes, you should have this test more often.     Have a colonoscopy at age 50, or have a yearly FIT test (stool test). These exams will check for colon cancer.      Talk with your health care provider  about whether or not a prostate cancer screening test (PSA) is right for you.    You should be tested each year for STDs (sexually transmitted diseases), if you re at risk.     Shots: Get a flu shot each year. Get a tetanus shot every 10 years.     Nutrition:    Eat at least 5 servings of fruits and vegetables daily.     Eat whole-grain bread, whole-wheat pasta and brown rice instead of white grains and rice.     Get adequate Calcium and Vitamin D.     Lifestyle    Exercise for at least 150 minutes a week (30 minutes a day, 5 days a week). This will help you control your weight and prevent disease.     Limit alcohol to one drink per day.     No smoking.     Wear sunscreen to prevent skin cancer.     See your dentist every six months for an exam and cleaning.     See your eye doctor every 1 to 2 years.

## 2021-03-19 LAB
LAB TEST METHOD: NORMAL
SARS-COV-2 AB PNL SERPL IA: NEGATIVE

## 2021-03-23 ENCOUNTER — TELEPHONE (OUTPATIENT)
Dept: FAMILY MEDICINE | Facility: CLINIC | Age: 61
End: 2021-03-23

## 2021-03-23 NOTE — TELEPHONE ENCOUNTER
Pt is calling and states that he wants to talk to a nurse re his test results, he states he doesn't understand the lab values and what they mean, please call to advise.     Rosio Carrillo, [facility]  Staff

## 2021-03-23 NOTE — TELEPHONE ENCOUNTER
Routing to provider to review and advise on lab results from 3/18/21.  Do not note any provider interpretation, at this time.  Patient calling and asking for results and interpretation.  Thank you.    Marilin Buchanan RN  [facility]

## 2021-03-24 RX ORDER — TRIAMTERENE AND HYDROCHLOROTHIAZIDE 37.5; 25 MG/1; MG/1
1 CAPSULE ORAL EVERY MORNING
Qty: 90 CAPSULE | Refills: 1 | Status: SHIPPED | OUTPATIENT
Start: 2021-03-24 | End: 2021-10-13

## 2021-03-24 RX ORDER — ATORVASTATIN CALCIUM 10 MG/1
10 TABLET, FILM COATED ORAL DAILY
Qty: 90 TABLET | Refills: 3 | Status: SHIPPED | OUTPATIENT
Start: 2021-03-24 | End: 2022-06-10

## 2021-03-24 NOTE — TELEPHONE ENCOUNTER
Called patient and informed of the results as written.    Payal De Oliveira RN, [facility]

## 2021-03-24 NOTE — TELEPHONE ENCOUNTER
Negative antibody test to COVID-19 indicate no previous infection.  Normal urine albumin means no early sign of kidney disease despite hypertension. Continue Amlodipine.  Potassium is low due to Hydrochlorothiazide. Stop hydrochlorothiazide in favor of Dyazide. Rx sent.  Kidney function is normal.  Glucose is not within diabetes range.  Cholesterol panel is worse. Stop Simvastatin and start Atorvastatin. Rx sent.  No signs of anemia.

## 2021-04-05 ENCOUNTER — IMMUNIZATION (OUTPATIENT)
Dept: PEDIATRICS | Facility: CLINIC | Age: 61
End: 2021-04-05
Payer: COMMERCIAL

## 2021-04-05 PROCEDURE — 91300 PR COVID VAC PFIZER DIL RECON 30 MCG/0.3 ML IM: CPT

## 2021-04-05 PROCEDURE — 0001A PR COVID VAC PFIZER DIL RECON 30 MCG/0.3 ML IM: CPT

## 2021-04-26 ENCOUNTER — IMMUNIZATION (OUTPATIENT)
Dept: PEDIATRICS | Facility: CLINIC | Age: 61
End: 2021-04-26
Attending: INTERNAL MEDICINE
Payer: COMMERCIAL

## 2021-04-26 PROCEDURE — 0002A PR COVID VAC PFIZER DIL RECON 30 MCG/0.3 ML IM: CPT

## 2021-04-26 PROCEDURE — 91300 PR COVID VAC PFIZER DIL RECON 30 MCG/0.3 ML IM: CPT

## 2021-06-23 ENCOUNTER — OFFICE VISIT (OUTPATIENT)
Dept: ORTHOPEDICS | Facility: CLINIC | Age: 61
End: 2021-06-23
Payer: COMMERCIAL

## 2021-06-23 VITALS
HEIGHT: 63 IN | WEIGHT: 149.9 LBS | DIASTOLIC BLOOD PRESSURE: 82 MMHG | HEART RATE: 86 BPM | BODY MASS INDEX: 26.56 KG/M2 | SYSTOLIC BLOOD PRESSURE: 116 MMHG

## 2021-06-23 DIAGNOSIS — M17.11 PRIMARY OSTEOARTHRITIS OF RIGHT KNEE: Primary | ICD-10-CM

## 2021-06-23 DIAGNOSIS — G89.29 CHRONIC PAIN OF RIGHT KNEE: ICD-10-CM

## 2021-06-23 DIAGNOSIS — M25.561 CHRONIC PAIN OF RIGHT KNEE: ICD-10-CM

## 2021-06-23 PROCEDURE — 20610 DRAIN/INJ JOINT/BURSA W/O US: CPT | Mod: RT | Performed by: ORTHOPAEDIC SURGERY

## 2021-06-23 RX ADMIN — BUPIVACAINE HYDROCHLORIDE 4 ML: 2.5 INJECTION, SOLUTION INFILTRATION; PERINEURAL at 11:17

## 2021-06-23 RX ADMIN — METHYLPREDNISOLONE ACETATE 80 MG: 80 INJECTION, SUSPENSION INTRA-ARTICULAR; INTRALESIONAL; INTRAMUSCULAR; SOFT TISSUE at 11:17

## 2021-06-23 ASSESSMENT — PAIN SCALES - GENERAL: PAINLEVEL: EXTREME PAIN (8)

## 2021-06-23 ASSESSMENT — MIFFLIN-ST. JEOR: SCORE: 1380.07

## 2021-06-23 NOTE — LETTER
"    6/23/2021         RE: Jose Gaines  [address]  [place] [address]        Dear Colleague,    Thank you for referring your patient, Jose Gaines, to the [facility]. Please see a copy of my visit note below.    CHIEF COMPLAINT:   Chief Complaint   Patient presents with     Right Knee - Pain     Last injection: 12/5/19. Injection worked good lasting over a year. He is having pain again, anterior. Pain is worse with going up and down stairs. No recent tx. He would like to try an injection again, denies Xray.   .      HISTORY OF PRESENT ILLNESS    Jose Gaines is a 61 year old male seen for evaluation of ongoing right knee pain with no known injury.   Seen for the sae 12/5/2019 and hand cortisone injection. Worked well over a years. Pain returning, mostly front of the knee. Worse with stairs. He'd like another injection today.     Locates pain in front and \"inside\" the knee. Aggravated with weight bearing, walking but worse with stairs, sit to stand. Not swelling like last visit. Ok at rest, occasional night pain.  He works as a  on her feet all day. Treatment with ibuprofen, pain medications, knee sleeve.    History of pretibial bursal excision near the tibial tubercle ~2005 which became infected with resultant I+D, iv antibiotics and PICC line.    Present symptoms: pain anteriorly and diffuse, pain shooting, dull/achy , moderate pain, severe pain, mild swelling.    Pain severity: 8/10  Frequency of symptoms: are constant  Exacerbating Factors: weight bearing, stairs, [id number]  Relieving Factors: rest, sitting  Night Pain: Yes  Pain while at rest: No   Numbness or tingling: No   Patient has tried:     NSAIDS: Yes      Physical Therapy: No      Activity modification: No      Bracing: sleeve      Injections: No      Ice: No      Assistive device:  No    Orthopaedic PMH: right knee pretibial/bursal excision with postop infection, I+D, 2005 ([facility]).    Other PMH:  has a past medical history of Allergic rhinitis and ED (erectile dysfunction).  Patient Active Problem List   Diagnosis     Seasonal allergic rhinitis     CARDIOVASCULAR SCREENING; LDL GOAL LESS THAN 130     Chronic hepatitis B (H)     HTN, goal below 140/80     Hyperlipidemia LDL goal <100     Internal hemorrhoid     Language barrier     Oral ulcer     Overweight     Pain of meniscus of right knee     Clonic hemifacial spasm, right     HSV-2 seropositive       Surgical Hx:  has a past surgical history that includes surgical history of -  (1/31/05 & 8/05).    Medications:   Current Outpatient Medications:      amLODIPine (NORVASC) 5 MG tablet, Take 1 tablet (5 mg) by mouth daily, Disp: 90 tablet, Rfl: 1     aspirin 81 MG EC tablet, Take 81 mg by mouth daily, Disp: , Rfl:      atorvastatin (LIPITOR) 10 MG tablet, Take 1 tablet (10 mg) by mouth daily, Disp: 90 tablet, Rfl: 3     diclofenac (VOLTAREN) 1 % topical gel, Place 4 g onto the skin 4 times daily, Disp: 100 g, Rfl: 11     fluticasone (FLONASE) 50 MCG/ACT nasal spray, SHAKE LQ AND U 1 SPR IEN QD, Disp: , Rfl:      multivitamin  with lutein (OCUVITE WITH LTEIN) CAPS per capsule, Take 1 capsule by mouth daily, Disp: 30 capsule, Rfl: 11     olopatadine (PATANOL) 0.1 % ophthalmic solution, Place 1 drop into the right eye 2 times daily, Disp: 1 mL, Rfl: 11     triamterene-HCTZ (DYAZIDE) 37.5-25 MG capsule, Take 1 capsule by mouth every morning, Disp: 90 capsule, Rfl: 1     valACYclovir (VALTREX) 500 MG tablet, Take 1 tablet (500 mg) by mouth daily, Disp: 90 tablet, Rfl: 3    Allergies: No Known Allergies    Social Hx:  reports that he has never smoked. He has never used smokeless tobacco. He reports current alcohol use. He reports that he does not use drugs.    Family Hx: family history includes Diabetes in his brother; Diabetes (age of onset: 50) in his father.    REVIEW OF SYSTEMS: 10 point ROS neg other than the symptoms noted  "above in the HPI and PMH. Notables include  CONSTITUTIONAL:NEGATIVE for fever, chills, change in weight  INTEGUMENTARY/SKIN: NEGATIVE for worrisome rashes, moles or lesions  MUSCULOSKELETAL:See HPI above  NEURO: NEGATIVE for weakness, dizziness or paresthesias    PHYSICAL EXAM:  /82   Pulse 86   Ht 1.6 m (5' 3\")   Wt 68 kg (149 lb 14.4 oz)   BMI 26.55 kg/m     GENERAL APPEARANCE: healthy, alert, no distress  SKIN: no suspicious lesions or rashes  NEURO: Normal strength and tone, mentation intact and speech normal  PSYCH:  mentation appears normal and affect normal, not anxious  RESPIRATORY: No increased work of breathing.    BILATERAL LOWER EXTREMITIES:  Gait: normal  Alignment: valgus right, slight varus left.  No gross deformities or masses.  No Quad atrophy, strength normal.  Intact sensation deep peroneal nerve, superficial peroneal nerve, med/lat tibial nerve, sural nerve, saphenous nerve  Intact EHL, EDL, TA, FHL, GS, quadriceps hamstrings and hip flexors  Toes warm and well perfused, brisk capillary refill. Palpable 2+ dp pulses.  Bilateral calf soft and nttp or squeeze.  Edema: trace    LEFT KNEE EXAM:    Skin: intact, no ecchymosis or erythema  ROM: full extension to 130 flexion  Tight hamstrings on straight leg raise.  Effusion: none  Tender: NTTP med/lat joint line, anterior or posterior knee  McMurrays: negative    MCL: stable, and non-painful at both 0 and 30 degrees knee flexion  Varus stress: stable, and non-painful at both 0 and 30 degrees knee flexion  Lachmans: neg, firm endpoint  Posterior Drawer stable  Patellofemoral joint:                Apprehension: negative              Crepitations: mild    RIGHT KNEE EXAM:    Skin: intact, no ecchymosis or erythema  ROM: full extension to 115 flexion, anterior discomfort.  Tight hamstrings on straight leg raise.  Effusion: trace  Tender: medial, lateral joint line, pes  McMurrays: negative    MCL: stable, and non-painful at both 0 and 30 degrees " "knee flexion  Varus stress: stable, and non-painful at both 0 and 30 degrees knee flexion  Lachmans: neg, firm endpoint  Posterior Drawer stable  Patellofemoral joint:                Apprehension: negative              Crepitations: mild   Grind: positive.    X-RAY: no new images today.  3 views right knee from 11/14/2019-- no obvious fractures or dislocations. moderate tricompartmental degenerative arthrosis, greatest in the lateral and patellofemoral compartments, where there is moderate joint space narrowing and marginal osteophytes. Moderate knee joint effusion.     MRI:  MRI right knee from 11/25/2019     1. Small to moderate size right knee joint effusion.  2. Osteoarthrosis of the right knee, most prominent in the lateral and  patellofemoral compartments with focal areas of high-grade cartilage  loss, as described above. Patellar subchondral cystic changes adjacent to area  of cartilage loss  3. Complex degenerative tearing of the anterior horn and body of the  lateral meniscus with meniscal fragments in the lateral femoral  recess.  4. The ACL, PCL, medial and lateral supporting structures, and medial  meniscus are intact.       ASSESSMENT/PLAN: Jose Gaines is a 61 year old male with chronic right knee pain, primary osteoarthritis     * reviewed imaging studies with patient, showing arthritis. Arthritis is wearing of the cartilage due to longstanding \"wear and tear\" or can follow an injury to the joint.    Discussed typical symptoms of arthritic pain is pain aggravated with weight bearing activities, stiffness, relieved by sitting or rest. Swelling may be associated. It is common to have some grinding and popping in the knee with arthritis.    Discussed various treatments for degenerative arthrosis of the knee, including nonoperative and operative approaches. Nonoperative approaches, which are exhausted prior to operative treatment, include doing nothing and living with the pain as patient has been " doing, activity modification (avoid aggravating activities), physical therapy and strengthening exercises, weight loss, anti-inflammatory medications, bracing, ambulatory aids (cane, walker) and injections. Once these have been tried and are deemed unsuccessful with a good effort, and the patient is an appropriate candidate, the next treatment would be knee arthroplasty or replacement. Depending on the location of the arthritis, knee replacement can be partial (one side of the knee affected by arthritis) or a total knee arthroplasty (all 3 compartments). The risks, perceived benefits and perioperative rehabilitation expectations of knee arthroplasty were discussed in detail. Also discussed that approximately 10% of patients that undergo knee arthroplasty are not happy following surgery and may have worse pain or no improvement in pain, contrary to their preoperative expectations.    At this time, nonoperative treatment will be pursued.    Non-surgical treatment for knee arthritis includes:    * rest, sitting  * Activity modification - avoid impact activities or activities that aggravate symptoms.  * NSAIDS (non-steroidal anti-inflammatory medications; e.g. Aleve, advil, motrin, ibuprofen) - regular use for inflammation ( twice daily or three times daily), with food, as long as no contra-indications Please discuss with primary care doctor if needed  * ice, 15-20 minutes at a time several times a day or as needed.  * Strengthening of quadriceps muscles  * Physical Therapy for strengthening, stretching and range of motion exercises of legs  * Tylenol as needed for pain, consider Tylenol arthritis or similar  * Weight loss: Weight loss:  Body mass index is 26.55 kg/m .. weight loss benefits, not only for the current pain symptoms, but also overall health. Recommend a good diet plan that works for the patient, with the assistance of a dietician or primary care doctor as needed. Also, a good, low-impact exercise program  "for at least 20 minutes per day, 3 times per week, such as exercise bike, elliptical , or pool.  * Exercise: low impact such as stationary bike, elliptical, pool.  * Injections: cortisone versus viscosupplementation (hyaluronic acid, \"rooster comb\", \"gel shots\"); risks and perceived benefits discussed today. Patient elects  to proceed.  * Bracing: bracing the knee may offer some relief of symptoms when worn and provide some stability.  * over the counter supplements such as glucosamine and chondroitin sulfate may help with joint pain.  * topical ointments may help as well    * return to clinic as needed.      [place] to follow up with Primary Care provider regarding elevated blood pressure.      Chato Nevarez M.D., M.S.  Dept. of Orthopaedic Surgery  [facility]    Large Joint Injection/Arthocentesis: R knee joint    Date/Time: 6/23/2021 11:17 AM  Performed by: Chato Nevarez MD  Authorized by: Chato Nevarez MD     Indications:  Pain  Needle Size:  22 G  Guidance: landmark guided    Approach:  Anteromedial  Location:  Knee      Medications:  80 mg methylPREDNISolone 80 MG/ML; 4 mL bupivacaine 0.25 %  Outcome:  Tolerated well, no immediate complications  Procedure discussed: discussed risks, benefits, and alternatives    Consent Given by:  Patient  Timeout: timeout called immediately prior to procedure    Prep: patient was prepped and draped in usual sterile fashion              Again, thank you for allowing me to participate in the care of your patient.        Sincerely,        Chato Nevarez MD    "

## 2021-06-23 NOTE — PROGRESS NOTES
"CHIEF COMPLAINT:   Chief Complaint   Patient presents with     Right Knee - Pain     Last injection: 12/5/19. Injection worked good lasting over a year. He is having pain again, anterior. Pain is worse with going up and down stairs. No recent tx. He would like to try an injection again, denies Xray.   .      HISTORY OF PRESENT ILLNESS    Jose Gaines is a 61 year old male seen for evaluation of ongoing right knee pain with no known injury.   Seen for the [facility] 12/5/2019 and hand cortisone injection. Worked well over a years. Pain returning, mostly front of the knee. Worse with stairs. He'd like another injection today.     Locates pain in front and \"inside\" the knee. Aggravated with weight bearing, walking but worse with stairs, sit to stand. Not swelling like last visit. Ok at rest, occasional night pain.  He works as a  on her feet all day. Treatment with ibuprofen, pain medications, knee sleeve.    History of pretibial bursal excision near the tibial tubercle ~2005 which became infected with resultant I+D, iv antibiotics and PICC line.    Present symptoms: pain anteriorly and diffuse, pain shooting, dull/achy , moderate pain, severe pain, mild swelling.    Pain severity: 8/10  Frequency of symptoms: are constant  Exacerbating Factors: weight bearing, stairs, [id number]  Relieving Factors: rest, sitting  Night Pain: Yes  Pain while at rest: No   Numbness or tingling: No   Patient has tried:     NSAIDS: Yes      Physical Therapy: No      Activity modification: No      Bracing: sleeve      Injections: No      Ice: No      Assistive device:  No    Orthopaedic PMH: right knee pretibial/bursal excision with postop infection, I+D, 2005 (?[facility]).    Other PMH:  has a past medical history of Allergic rhinitis and ED (erectile dysfunction).  Patient Active Problem List   Diagnosis     Seasonal allergic rhinitis     CARDIOVASCULAR SCREENING; LDL GOAL LESS THAN 130     Chronic " hepatitis B (H)     HTN, goal below 140/80     Hyperlipidemia LDL goal <100     Internal hemorrhoid     Language barrier     Oral ulcer     Overweight     Pain of meniscus of right knee     Clonic hemifacial spasm, right     HSV-2 seropositive       Surgical Hx:  has a past surgical history that includes surgical history of -  (1/31/05 & 8/05).    Medications:   Current Outpatient Medications:      amLODIPine (NORVASC) 5 MG tablet, Take 1 tablet (5 mg) by mouth daily, Disp: 90 tablet, Rfl: 1     aspirin 81 MG EC tablet, Take 81 mg by mouth daily, Disp: , Rfl:      atorvastatin (LIPITOR) 10 MG tablet, Take 1 tablet (10 mg) by mouth daily, Disp: 90 tablet, Rfl: 3     diclofenac (VOLTAREN) 1 % topical gel, Place 4 g onto the skin 4 times daily, Disp: 100 g, Rfl: 11     fluticasone (FLONASE) 50 MCG/ACT nasal spray, SHAKE LQ AND U 1 SPR IEN QD, Disp: , Rfl:      multivitamin  with lutein (OCUVITE WITH LTEIN) CAPS per capsule, Take 1 capsule by mouth daily, Disp: 30 capsule, Rfl: 11     olopatadine (PATANOL) 0.1 % ophthalmic solution, Place 1 drop into the right eye 2 times daily, Disp: 1 mL, Rfl: 11     triamterene-HCTZ (DYAZIDE) 37.5-25 MG capsule, Take 1 capsule by mouth every morning, Disp: 90 capsule, Rfl: 1     valACYclovir (VALTREX) 500 MG tablet, Take 1 tablet (500 mg) by mouth daily, Disp: 90 tablet, Rfl: 3    Allergies: No Known Allergies    Social Hx:  reports that he has never smoked. He has never used smokeless tobacco. He reports current alcohol use. He reports that he does not use drugs.    Family Hx: family history includes Diabetes in his brother; Diabetes (age of onset: 50) in his father.    REVIEW OF SYSTEMS: 10 point ROS neg other than the symptoms noted above in the HPI and PMH. Notables include  CONSTITUTIONAL:NEGATIVE for fever, chills, change in weight  INTEGUMENTARY/SKIN: NEGATIVE for worrisome rashes, moles or lesions  MUSCULOSKELETAL:See HPI above  NEURO: NEGATIVE for weakness, dizziness or  "paresthesias    PHYSICAL EXAM:  /82   Pulse 86   Ht 1.6 m (5' 3\")   Wt 68 kg (149 lb 14.4 oz)   BMI 26.55 kg/m     GENERAL APPEARANCE: healthy, alert, no distress  SKIN: no suspicious lesions or rashes  NEURO: Normal strength and tone, mentation intact and speech normal  PSYCH:  mentation appears normal and affect normal, not anxious  RESPIRATORY: No increased work of breathing.    BILATERAL LOWER EXTREMITIES:  Gait: normal  Alignment: valgus right, slight varus left.  No gross deformities or masses.  No Quad atrophy, strength normal.  Intact sensation deep peroneal nerve, superficial peroneal nerve, med/lat tibial nerve, sural nerve, saphenous nerve  Intact EHL, EDL, TA, FHL, GS, quadriceps hamstrings and hip flexors  Toes warm and well perfused, brisk capillary refill. Palpable 2+ dp pulses.  Bilateral calf soft and nttp or squeeze.  Edema: trace    LEFT KNEE EXAM:    Skin: intact, no ecchymosis or erythema  ROM: full extension to 130 flexion  Tight hamstrings on straight leg raise.  Effusion: none  Tender: NTTP med/lat joint line, anterior or posterior knee  McMurrays: negative    MCL: stable, and non-painful at both 0 and 30 degrees knee flexion  Varus stress: stable, and non-painful at both 0 and 30 degrees knee flexion  Lachmans: neg, firm endpoint  Posterior Drawer stable  Patellofemoral joint:                Apprehension: negative              Crepitations: mild    RIGHT KNEE EXAM:    Skin: intact, no ecchymosis or erythema  ROM: full extension to 115 flexion, anterior discomfort.  Tight hamstrings on straight leg raise.  Effusion: trace  Tender: medial, lateral joint line, pes  McMurrays: negative    MCL: stable, and non-painful at both 0 and 30 degrees knee flexion  Varus stress: stable, and non-painful at both 0 and 30 degrees knee flexion  Lachmans: neg, firm endpoint  Posterior Drawer stable  Patellofemoral joint:                Apprehension: negative              Crepitations: mild   Grind: " "positive.    X-RAY: no new images today.  3 views right knee from 11/14/2019-- no obvious fractures or dislocations. moderate tricompartmental degenerative arthrosis, greatest in the lateral and patellofemoral compartments, where there is moderate joint space narrowing and marginal osteophytes. Moderate knee joint effusion.     MRI:  MRI right knee from 11/25/2019     1. Small to moderate size right knee joint effusion.  2. Osteoarthrosis of the right knee, most prominent in the lateral and  patellofemoral compartments with focal areas of high-grade cartilage  loss, as described above. Patellar subchondral cystic changes adjacent to area  of cartilage loss  3. Complex degenerative tearing of the anterior horn and body of the  lateral meniscus with meniscal fragments in the lateral femoral  recess.  4. The ACL, PCL, medial and lateral supporting structures, and medial  meniscus are intact.       ASSESSMENT/PLAN: Jose Gaines is a 61 year old male with chronic right knee pain, primary osteoarthritis     * reviewed imaging studies with patient, showing arthritis. Arthritis is wearing of the cartilage due to longstanding \"wear and tear\" or can follow an injury to the joint.    Discussed typical symptoms of arthritic pain is pain aggravated with weight bearing activities, stiffness, relieved by sitting or rest. Swelling may be associated. It is common to have some grinding and popping in the knee with arthritis.    Discussed various treatments for degenerative arthrosis of the knee, including nonoperative and operative approaches. Nonoperative approaches, which are exhausted prior to operative treatment, include doing nothing and living with the pain as patient has been doing, activity modification (avoid aggravating activities), physical therapy and strengthening exercises, weight loss, anti-inflammatory medications, bracing, ambulatory aids (cane, walker) and injections. Once these have been tried and are deemed " "unsuccessful with a good effort, and the patient is an appropriate candidate, the next treatment would be knee arthroplasty or replacement. Depending on the location of the arthritis, knee replacement can be partial (one side of the knee affected by arthritis) or a total knee arthroplasty (all 3 compartments). The risks, perceived benefits and perioperative rehabilitation expectations of knee arthroplasty were discussed in detail. Also discussed that approximately 10% of patients that undergo knee arthroplasty are not happy following surgery and may have worse pain or no improvement in pain, contrary to their preoperative expectations.    At this time, nonoperative treatment will be pursued.    Non-surgical treatment for knee arthritis includes:    * rest, sitting  * Activity modification - avoid impact activities or activities that aggravate symptoms.  * NSAIDS (non-steroidal anti-inflammatory medications; e.g. Aleve, advil, motrin, ibuprofen) - regular use for inflammation ( twice daily or three times daily), with food, as long as no contra-indications Please discuss with primary care doctor if needed  * ice, 15-20 minutes at a time several times a day or as needed.  * Strengthening of quadriceps muscles  * Physical Therapy for strengthening, stretching and range of motion exercises of legs  * Tylenol as needed for pain, consider Tylenol arthritis or similar  * Weight loss: Weight loss:  Body mass index is 26.55 kg/m .. weight loss benefits, not only for the current pain symptoms, but also overall health. Recommend a good diet plan that works for the patient, with the assistance of a dietician or primary care doctor as needed. Also, a good, low-impact exercise program for at least 20 minutes per day, 3 times per week, such as exercise bike, elliptical , or pool.  * Exercise: low impact such as stationary bike, elliptical, pool.  * Injections: cortisone versus viscosupplementation (hyaluronic acid, \"rooster " "comb\", \"gel shots\"); risks and perceived benefits discussed today. Patient elects  to proceed.  * Bracing: bracing the knee may offer some relief of symptoms when worn and provide some stability.  * over the counter supplements such as glucosamine and chondroitin sulfate may help with joint pain.  * topical ointments may help as well    * return to clinic as needed.      [place] to follow up with Primary Care provider regarding elevated blood pressure.      Chato Nevarez M.D., M.S.  Dept. of Orthopaedic Surgery  [facility]    Large Joint Injection/Arthocentesis: R knee joint    Date/Time: 6/23/2021 11:17 AM  Performed by: Chato Nevarez MD  Authorized by: Chato Nevarez MD     Indications:  Pain  Needle Size:  22 G  Guidance: landmark guided    Approach:  Anteromedial  Location:  Knee      Medications:  80 mg methylPREDNISolone 80 MG/ML; 4 mL bupivacaine 0.25 %  Outcome:  Tolerated well, no immediate complications  Procedure discussed: discussed risks, benefits, and alternatives    Consent Given by:  Patient  Timeout: timeout called immediately prior to procedure    Prep: patient was prepped and draped in usual sterile fashion          "

## 2021-06-24 RX ORDER — BUPIVACAINE HYDROCHLORIDE 2.5 MG/ML
4 INJECTION, SOLUTION INFILTRATION; PERINEURAL
Status: DISCONTINUED | OUTPATIENT
Start: 2021-06-23 | End: 2023-01-12

## 2021-06-24 RX ORDER — METHYLPREDNISOLONE ACETATE 80 MG/ML
80 INJECTION, SUSPENSION INTRA-ARTICULAR; INTRALESIONAL; INTRAMUSCULAR; SOFT TISSUE
Status: DISCONTINUED | OUTPATIENT
Start: 2021-06-23 | End: 2023-01-12

## 2021-07-22 ENCOUNTER — OFFICE VISIT (OUTPATIENT)
Dept: URGENT CARE | Facility: URGENT CARE | Age: 61
End: 2021-07-22
Payer: COMMERCIAL

## 2021-07-22 VITALS
TEMPERATURE: 98.2 F | RESPIRATION RATE: 16 BRPM | WEIGHT: 149.4 LBS | DIASTOLIC BLOOD PRESSURE: 88 MMHG | OXYGEN SATURATION: 95 % | SYSTOLIC BLOOD PRESSURE: 144 MMHG | BODY MASS INDEX: 26.47 KG/M2 | HEART RATE: 84 BPM

## 2021-07-22 DIAGNOSIS — I10 HTN, GOAL BELOW 140/80: ICD-10-CM

## 2021-07-22 DIAGNOSIS — N52.9 ERECTILE DYSFUNCTION, UNSPECIFIED ERECTILE DYSFUNCTION TYPE: ICD-10-CM

## 2021-07-22 DIAGNOSIS — M79.2 TRIGGER POINT WITH NEURALGIA: Primary | ICD-10-CM

## 2021-07-22 PROCEDURE — 99214 OFFICE O/P EST MOD 30 MIN: CPT | Performed by: NURSE PRACTITIONER

## 2021-07-22 RX ORDER — NAPROXEN 500 MG/1
500 TABLET ORAL 2 TIMES DAILY WITH MEALS
Qty: 20 TABLET | Refills: 0 | Status: SHIPPED | OUTPATIENT
Start: 2021-07-22 | End: 2024-06-07

## 2021-07-22 ASSESSMENT — PAIN SCALES - GENERAL: PAINLEVEL: EXTREME PAIN (9)

## 2021-07-22 NOTE — PROGRESS NOTES
Assessment & Plan     Trigger point with neuralgia    - naproxen (NAPROSYN) 500 MG tablet  Dispense: 20 tablet; Refill: 0    HTN, goal below 140/80    Erectile dysfunction, unspecified erectile dysfunction type       Discussed trigger point in right upper back likely causing discomfort in right arm. Prescription sent to pharmacy for Naproxen 500 mg every 12 hours with food for next 7-10 days, do not take with other NSAIDs. Recommended gentle massage. Recommended he follow-up with his PCP regarding erectile dysfunction.     Patient is hypertensive today but asymptomatic.  No headache, blurring of vision, chest pain, shortness of breath, dizziness, weakness. Second BP reading was also above goal.  Patient instructed to follow up with PCP within the next week for BP recheck.  Instructed to go to emergency department if develops chest pain, shortness of breath, dizziness, vision changes, weakness.     Follow-up with PCP if symptoms persist for 7 days, and sooner if symptoms worsen or new symptoms develop.     Discussed red flag symptoms which warrant immediate visit in emergency room    All questions were answered and patient verbalized understanding. AVS reviewed with patient.     Afsaneh Leyva, DNP, APRN, CNP 7/22/2021 3:06 PM  [facility] KRISTAL Garcia is a 61 year old male who presents to clinic today for the following health issues:  Chief Complaint   Patient presents with     Arm Pain     His right arm has been painful and numb- pain has been there for about 3-4 weeks. Patient uses his right arm a lot for work.     Patient presents for evaluation of right arm pain for the past month. He has intermittent numbness in his right arm. Pain and numbness is worsen when he lays down. He says the pain is originating in his left upper back because when his wife puts her elbow on the spot the pain gets a lot worse. He has not tried anything for symptoms since he has a  history of HTN and elevated cholesterol. No pain currently, but can be severe. He was supposed to see his PCP today for these symptoms but missed his appointment. No history of heart attack or stroke. He does have a history of HTN and elevated cholesterol. No history of kidney disease, last creatinine 0.87 on 3/18/21.     Also, he states he has been not enjoying making love and having difficulty with erection. Denies fever, chills, dysuria, hematuria, urgency, frequency.       Problem list, Medication list, Allergies, and Medical history reviewed in EPIC.    ROS:  Review of systems negative except for noted above        Objective    BP (!) 144/88   Pulse 84   Temp 98.2  F (36.8  C) (Tympanic)   Resp 16   Wt 67.8 kg (149 lb 6.4 oz)   SpO2 95%   BMI 26.47 kg/m    Physical Exam  Constitutional:       General: He is not in acute distress.     Appearance: He is not toxic-appearing or diaphoretic.   HENT:      Head: Normocephalic and atraumatic.      Mouth/Throat:      Comments: Tongue midline  Eyes:      General:         Right eye: No discharge.         Left eye: No discharge.      Extraocular Movements: Extraocular movements intact.      Pupils: Pupils are equal, round, and reactive to light.   Cardiovascular:      Rate and Rhythm: Normal rate and regular rhythm.      Heart sounds: Normal heart sounds.   Pulmonary:      Effort: Pulmonary effort is normal. No respiratory distress.      Breath sounds: Normal breath sounds. No wheezing, rhonchi or rales.   Musculoskeletal:      Right shoulder: Normal.      Right upper arm: Normal.      Right elbow: Normal.      Cervical back: Normal range of motion.      Comments: No spinal tenderness. Tenderness with palpation right upper back making arm pain worse   Lymphadenopathy:      Cervical: No cervical adenopathy.   Skin:     General: Skin is warm and dry.      Capillary Refill: Capillary refill takes less than 2 seconds.      Findings: No bruising or erythema.    Neurological:      General: No focal deficit present.      Mental Status: He is alert and oriented to person, place, and time.      Cranial Nerves: No cranial nerve deficit.      Sensory: No sensory deficit.      Motor: No weakness.      Coordination: Coordination normal.      Gait: Gait normal.      Comments: Bilateral strengths upper extremities 5/5

## 2021-07-22 NOTE — PATIENT INSTRUCTIONS
Patient Education     Myofascial Pain Syndrome  Your pain is caused by a state of chronic muscle tension. This condition is called by various names: myofascial pain, fibrositis, and trigger point pain. This can also be due to mechanical stress, such as working at a computer terminal for long periods or work that requires repetitive motions of the arms or hands. It can also be caused by emotional stress, such as problems on the job or in your personal life. Sometimes there is no obvious cause. The pain can happen in the area of the muscle spasm or at a site distant to it. For example, spasm of a neck muscle can cause headache. Spasm of the muscle near the shoulder blade can cause pain shooting down the arm.  Home care    Try to identify the factors that may be causing your problem and change them:  ? If you feel that emotional stress is a cause of your pain, learn methods to better deal with the stress in your life. These may include regular exercise, muscle relaxation techniques, meditation, or simply taking time out for yourself. Talk with your healthcare provider or go to a local bookstore and review the many books and tapes about reducing stress.  ? If you feel that physical stress is a cause for your pain, try to change any poor work habits.    You may use over-the-counter pain medicine to control pain, unless another medicine was prescribed. If you have chronic liver or kidney disease or ever had a stomach ulcer or gastrointestinal bleeding, talk with your healthcare provider before using these medicines.    Apply an ice pack over the injured area for 15 to 20 minutes every 3 to 6 hours. You should do this for the first 24 to 48 hours. You can make an ice pack by filling a plastic bag that seals at the top with ice cubes and then wrapping it with a thin towel. Be careful not to injure your skin with the ice treatments. Ice should never be applied directly to skin. Continue the use of ice packs for relief of  pain and swelling as needed. After 48 hours, apply heat (warm shower or warm bath) for 15 to 20 minutes several times a day, or alternate ice and heat.    Massage the trigger point and stretch out the muscle. Trigger point massage can be done by applying heat to the area to warm and prepare the muscle. Then have someone apply steady thumb pressure directly on the knot in the muscle for 30 seconds. Release the pressure, then massage the surrounding muscle. Repeat the process, applying more pressure to the trigger point each time. Do this up to the limit of pain. With each treatment, the trigger point should become less tender and the pain should decrease. You can apply local pressure to trigger points in the back by lying on the floor with a tennis ball under the trigger point.  Follow-up care  Follow up with your healthcare provider, or as advised. You may need physical therapy if you don t respond to home treatment alone.  Call 911  Call 911 if you have:    A trigger point in the chest muscles, pain that becomes more severe, lasts longer, or spreads into your shoulder, arm, or jaw    Chest pain or discomfort    Trouble breathing with or without chest discomfort     Sweating, lightheadedness, nausea, or vomiting along with chest discomfort    Sudden weakness or numbness in the arm, leg, or face, especially if this happens on one side of the body  When to seek medical advice  Call your healthcare provider right away if any of these occur:    A week passes and you have not improved    If your pain worsens, regardless of its location  Michel last reviewed this educational content on 5/1/2018 2000-2021 [employer]. All rights reserved. This information is not intended as a substitute for professional medical care. Always follow your healthcare professional's instructions.

## 2021-09-19 ENCOUNTER — HEALTH MAINTENANCE LETTER (OUTPATIENT)
Age: 61
End: 2021-09-19

## 2022-05-01 ENCOUNTER — HEALTH MAINTENANCE LETTER (OUTPATIENT)
Age: 62
End: 2022-05-01

## 2022-06-09 DIAGNOSIS — E78.5 HYPERLIPIDEMIA LDL GOAL <100: ICD-10-CM

## 2022-06-09 NOTE — TELEPHONE ENCOUNTER
"Requested Prescriptions   Pending Prescriptions Disp Refills    atorvastatin (LIPITOR) 10 MG tablet [Pharmacy Med Name: ATORVASTATIN 10MG TABLETS] 90 tablet 3     Sig: TAKE 1 TABLET(10 MG) BY MOUTH DAILY        Statins Protocol Failed - 6/9/2022  5:44 AM        Failed - LDL on file in past 12 months     Recent Labs   Lab Test 03/18/21  0958   *               Failed - Recent (12 mo) or future (30 days) visit within the authorizing provider's specialty     Patient has had an office visit with the authorizing provider or a provider within the authorizing providers department within the previous 12 mos or has a future within next 30 days. See \"Patient Info\" tab in inbasket, or \"Choose Columns\" in Meds & Orders section of the refill encounter.              Passed - No abnormal creatine kinase in past 12 months     No lab results found.             Passed - Medication is active on med list        Passed - Patient is age 18 or older              "

## 2022-06-10 RX ORDER — ATORVASTATIN CALCIUM 10 MG/1
TABLET, FILM COATED ORAL
Qty: 60 TABLET | Refills: 0 | Status: SHIPPED | OUTPATIENT
Start: 2022-06-10 | End: 2022-08-25

## 2022-08-22 DIAGNOSIS — E78.5 HYPERLIPIDEMIA LDL GOAL <100: ICD-10-CM

## 2022-08-22 RX ORDER — ATORVASTATIN CALCIUM 10 MG/1
TABLET, FILM COATED ORAL
Qty: 60 TABLET | Refills: 0 | OUTPATIENT
Start: 2022-08-22

## 2022-08-22 NOTE — LETTER
September 1, 2022          Jose Gaines  [address]  [place] [address]            Dear Jose Gaines,      At [facility] we care about your health and are committed to providing quality patient care. Regular appointments are a vital part of the care and management of your health and can help prevent many of the complications that can occur.      It has come to our attention that you are due for Medication check before further refills.  Please call [facility] at [phone] soon to schedule your follow up appointment.    If you have transferred care to another clinic please call to inform us so that we do not continue to send you reminder letters.      Sincerely,      [facility] Care Team

## 2022-08-25 RX ORDER — ATORVASTATIN CALCIUM 10 MG/1
10 TABLET, FILM COATED ORAL DAILY
Qty: 60 TABLET | Refills: 0 | Status: SHIPPED | OUTPATIENT
Start: 2022-08-25 | End: 2024-06-07

## 2022-11-21 ENCOUNTER — HEALTH MAINTENANCE LETTER (OUTPATIENT)
Age: 62
End: 2022-11-21

## 2023-01-05 ENCOUNTER — DOCUMENTATION ONLY (OUTPATIENT)
Dept: LAB | Facility: CLINIC | Age: 63
End: 2023-01-05
Payer: COMMERCIAL

## 2023-01-05 NOTE — PROGRESS NOTES
Jose Gaines has an upcoming lab appointment:    Future Appointments   Date Time Provider Department Center   1/11/2023  7:15 AM [facility]   1/12/2023  4:40 PM Ky Stone MD [facility]     Patient is scheduled for the following lab(s): none      There is no order available. Please review and place either future orders or HMPO (Review of Health Maintenance Protocol Orders), as appropriate.    Health Maintenance Due   Topic     ANNUAL REVIEW OF HM ORDERS      HEPATITIS C SCREENING      Simi Rothman

## 2023-01-11 ENCOUNTER — LAB (OUTPATIENT)
Dept: LAB | Facility: CLINIC | Age: 63
End: 2023-01-11
Payer: COMMERCIAL

## 2023-01-11 LAB
HOLD SPECIMEN: NORMAL

## 2023-01-11 PROCEDURE — 36415 COLL VENOUS BLD VENIPUNCTURE: CPT

## 2023-01-11 PROCEDURE — 80053 COMPREHEN METABOLIC PANEL: CPT | Performed by: INTERNAL MEDICINE

## 2023-01-11 PROCEDURE — 87517 HEPATITIS B DNA QUANT: CPT | Performed by: INTERNAL MEDICINE

## 2023-01-11 PROCEDURE — 80061 LIPID PANEL: CPT | Performed by: INTERNAL MEDICINE

## 2023-01-11 PROCEDURE — 83036 HEMOGLOBIN GLYCOSYLATED A1C: CPT | Performed by: INTERNAL MEDICINE

## 2023-01-12 ENCOUNTER — OFFICE VISIT (OUTPATIENT)
Dept: FAMILY MEDICINE | Facility: CLINIC | Age: 63
End: 2023-01-12
Payer: COMMERCIAL

## 2023-01-12 VITALS
WEIGHT: 154 LBS | BODY MASS INDEX: 26.29 KG/M2 | HEART RATE: 70 BPM | SYSTOLIC BLOOD PRESSURE: 135 MMHG | TEMPERATURE: 98.6 F | OXYGEN SATURATION: 96 % | HEIGHT: 64 IN | DIASTOLIC BLOOD PRESSURE: 81 MMHG

## 2023-01-12 DIAGNOSIS — I10 HTN, GOAL BELOW 140/90: ICD-10-CM

## 2023-01-12 DIAGNOSIS — E78.5 HYPERLIPIDEMIA LDL GOAL <100: ICD-10-CM

## 2023-01-12 DIAGNOSIS — Z00.00 ROUTINE GENERAL MEDICAL EXAMINATION AT A HEALTH CARE FACILITY: Primary | ICD-10-CM

## 2023-01-12 DIAGNOSIS — E11.9 WELL CONTROLLED TYPE 2 DIABETES MELLITUS (H): ICD-10-CM

## 2023-01-12 DIAGNOSIS — B18.1 VIRAL HEPATITIS B CHRONIC (H): ICD-10-CM

## 2023-01-12 LAB — HBA1C MFR BLD: 6.2 % (ref 0–5.6)

## 2023-01-12 PROCEDURE — 99396 PREV VISIT EST AGE 40-64: CPT | Performed by: INTERNAL MEDICINE

## 2023-01-12 RX ORDER — HYDROCHLOROTHIAZIDE 25 MG/1
25 TABLET ORAL DAILY
Qty: 90 TABLET | Refills: 3 | Status: SHIPPED | OUTPATIENT
Start: 2023-01-12 | End: 2024-05-10

## 2023-01-12 RX ORDER — METFORMIN HCL 500 MG
500 TABLET, EXTENDED RELEASE 24 HR ORAL
Qty: 90 TABLET | Refills: 3 | Status: SHIPPED | OUTPATIENT
Start: 2023-01-12 | End: 2024-06-07

## 2023-01-12 RX ORDER — SIMVASTATIN 20 MG
20 TABLET ORAL AT BEDTIME
Qty: 90 TABLET | Refills: 1 | Status: SHIPPED | OUTPATIENT
Start: 2023-01-12 | End: 2024-06-07

## 2023-01-12 RX ORDER — AMLODIPINE BESYLATE 5 MG/1
5 TABLET ORAL DAILY
Qty: 90 TABLET | Refills: 3 | Status: SHIPPED | OUTPATIENT
Start: 2023-01-12 | End: 2024-01-28

## 2023-01-12 ASSESSMENT — ENCOUNTER SYMPTOMS
HEARTBURN: 0
NAUSEA: 0
PARESTHESIAS: 0
NERVOUS/ANXIOUS: 0
WEAKNESS: 0
HEMATURIA: 0
HEADACHES: 0
ARTHRALGIAS: 1
FREQUENCY: 0
JOINT SWELLING: 0
CONSTIPATION: 0
CHILLS: 0
FEVER: 0
EYE PAIN: 0
COUGH: 0
DIZZINESS: 0
ABDOMINAL PAIN: 0
SORE THROAT: 0
DYSURIA: 0
PALPITATIONS: 0
HEMATOCHEZIA: 0
DIARRHEA: 0
SHORTNESS OF BREATH: 0

## 2023-01-12 ASSESSMENT — PAIN SCALES - GENERAL: PAINLEVEL: NO PAIN (0)

## 2023-01-12 NOTE — PROGRESS NOTES
SUBJECTIVE:   CC: Jose is an 62 year old who presents for preventative health visit.     Patient has been advised of split billing requirements and indicates understanding: Yes  Healthy Habits:     Getting at least 3 servings of Calcium per day:  Yes    Bi-annual eye exam:  Yes    Dental care twice a year:  Yes    Sleep apnea or symptoms of sleep apnea:  None    Diet:  Vegetarian/vegan and Other    Frequency of exercise:  2-3 days/week    Duration of exercise:  15-30 minutes    Taking medications regularly:  Yes    Barriers to taking medications:  None    PHQ-2 Total Score: 0    Additional concerns today:  No      Today's PHQ-2 Score:   PHQ-2 ( 1999 [employer]) 1/12/2023   Q1: Little interest or pleasure in doing things 0   Q2: Feeling down, depressed or hopeless 0   PHQ-2 Score 0   PHQ-2 Total Score (12-17 Years)- Positive if 3 or more points; Administer PHQ-A if positive -   Q1: Little interest or pleasure in doing things Not at all   Q2: Feeling down, depressed or hopeless Not at all   PHQ-2 Score 0           Social History     Tobacco Use     Smoking status: Never     Smokeless tobacco: Never   Substance Use Topics     Alcohol use: Yes         Alcohol Use 1/12/2023   Prescreen: >3 drinks/day or >7 drinks/week? No     Last PSA:   PSA   Date Value Ref Range Status   03/04/2019 0.67 0 - 4 ug/L Final     Comment:     Assay Method:  Chemiluminescence using Siemens Vista analyzer       Reviewed orders with patient. Reviewed health maintenance and updated orders accordingly - Yes  Labs reviewed in EPIC  BP Readings from Last 3 Encounters:   01/12/23 135/81   07/22/21 (!) 144/88   06/23/21 116/82    Wt Readings from Last 3 Encounters:   01/12/23 69.9 kg (154 lb)   07/22/21 67.8 kg (149 lb 6.4 oz)   06/23/21 68 kg (149 lb 14.4 oz)                  Patient Active Problem List   Diagnosis     Seasonal allergic rhinitis     CARDIOVASCULAR SCREENING; LDL GOAL LESS THAN 130     Chronic hepatitis B (H)     HTN, goal below  140/80     Hyperlipidemia LDL goal <100     Internal hemorrhoid     Language barrier     Oral ulcer     Overweight     Pain of meniscus of right knee     Clonic hemifacial spasm, right     HSV-2 seropositive     Past Surgical History:   Procedure Laterality Date     SURGICAL HISTORY OF -   1/31/05 & 8/05    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2* 3.3*        Reviewed and updated as needed this visit by clinical staff   Tobacco  Allergies  Meds              Reviewed and updated as needed this visit by Provider                     Review of Systems   Constitutional: Negative for chills and fever.   HENT: Negative for congestion, ear pain, hearing loss and sore throat.    Eyes: Negative for pain and visual disturbance.   Respiratory: Negative for cough and shortness of breath.    Cardiovascular: Negative for chest pain, palpitations and peripheral edema.   Gastrointestinal: Negative for abdominal pain, constipation, diarrhea, heartburn, hematochezia and nausea.   Genitourinary: Negative for dysuria, frequency, genital sores, hematuria, impotence, penile discharge and urgency.   Musculoskeletal: Positive for arthralgias. Negative for joint swelling.   Skin: Negative for rash.   Neurological: Negative for dizziness, weakness, headaches and paresthesias.   Psychiatric/Behavioral: Negative for mood changes. The patient is not  "nervous/anxious.          OBJECTIVE:   /81 (BP Location: Left arm, Patient Position: Chair, Cuff Size: Adult Regular)   Pulse 70   Temp 98.6  F (37  C) (Tympanic)   Ht 1.613 m (5' 3.5\")   Wt 69.9 kg (154 lb)   SpO2 96%   BMI 26.85 kg/m      Physical Exam  GENERAL: healthy, alert and no distress  EYES: Eyes grossly normal to inspection, PERRL and conjunctivae and sclerae normal  HENT: ear canals and TM's normal, nose and mouth without ulcers or lesions  NECK: no adenopathy, no asymmetry, masses, or scars and thyroid normal to palpation  RESP: lungs clear to auscultation - no rales, rhonchi or wheezes  CV: regular rate and rhythm, normal S1 S2, no S3 or S4, no murmur, click or rub, no peripheral edema and peripheral pulses strong  ABDOMEN: soft, nontender, no hepatosplenomegaly, no masses and bowel sounds normal  MS: no gross musculoskeletal defects noted, no edema  SKIN: no suspicious lesions or rashes  NEURO: Normal strength and tone, mentation intact and speech normal  PSYCH: mentation appears normal, affect normal/bright    Diagnostic Test Results:  Results for orders placed or performed in visit on 01/12/23   Hemoglobin A1c     Status: Abnormal   Result Value Ref Range    Hemoglobin A1C 6.2 (H) 0.0 - 5.6 %   Comprehensive metabolic panel     Status: Abnormal   Result Value Ref Range    Sodium 138 133 - 144 mmol/L    Potassium 3.4 3.4 - 5.3 mmol/L    Chloride 104 94 - 109 mmol/L    Carbon Dioxide (CO2) 25 20 - 32 mmol/L    Anion Gap 9 3 - 14 mmol/L    Urea Nitrogen 14 7 - 30 mg/dL    Creatinine 0.81 0.66 - 1.25 mg/dL    Calcium 9.1 8.5 - 10.1 mg/dL    Glucose 103 (H) 70 - 99 mg/dL    Alkaline Phosphatase 74 40 - 150 U/L    AST 32 0 - 45 U/L    ALT 55 0 - 70 U/L    Protein Total 8.1 6.8 - 8.8 g/dL    Albumin 3.8 3.4 - 5.0 g/dL    Bilirubin Total 0.9 0.2 - 1.3 mg/dL    GFR Estimate >90 >60 mL/min/1.73m2   Lipid panel reflex to direct LDL Non-fasting     Status: Abnormal   Result Value Ref Range    " Cholesterol 232 (H) <200 mg/dL    Triglycerides 133 <150 mg/dL    Direct Measure HDL 86 >=40 mg/dL    LDL Cholesterol Calculated 119 (H) <=100 mg/dL    Non HDL Cholesterol 146 (H) <130 mg/dL    Patient Fasting > 8hrs? No     Narrative    Cholesterol  Desirable:  <200 mg/dL    Triglycerides  Normal:  Less than 150 mg/dL  Borderline High:  150-199 mg/dL  High:  200-499 mg/dL  Very High:  Greater than or equal to 500 mg/dL    Direct Measure HDL  Female:  Greater than or equal to 50 mg/dL   Male:  Greater than or equal to 40 mg/dL    LDL Cholesterol  Desirable:  <100mg/dL  Above Desirable:  100-129 mg/dL   Borderline High:  130-159 mg/dL   High:  160-189 mg/dL   Very High:  >= 190 mg/dL    Non HDL Cholesterol  Desirable:  130 mg/dL  Above Desirable:  130-159 mg/dL  Borderline High:  160-189 mg/dL  High:  190-219 mg/dL  Very High:  Greater than or equal to 220 mg/dL   Hep B Virus DNA Quant Real Time PCR     Status: Abnormal   Result Value Ref Range    Hepatitis B DNA IU/mL, Instrument 26,647 (H) <1 IU/mL    Hepatitis B log 4.4     Narrative    The AJITH AmpliPrep/AJITH TaqMan HBV test is a FDA-approved in vitro nucleic acid amplification test for the quantitation of HBV DNA in human plasma (EDTA plasma) or serum using the AJITH AmpliPrep instrument for automated viral nucleic acid extraction and the AJITH TaqMan for the automated real-time PCR amplification and detection of viral nucleic acid target. Titer results are reported in International Units/mL (IU/mL) using the 1st WHO International standard for HBV for nucleic acid amplification assays.       ASSESSMENT/PLAN:     Routine general medical examination at a health care facility  - Comprehensive metabolic panel    Well controlled type 2 diabetes mellitus (H)  - Hemoglobin A1c  - metFORMIN (GLUCOPHAGE XR) 500 MG 24 hr tablet; Take 1 tablet (500 mg) by mouth daily (with dinner)    Hyperlipidemia LDL goal <100  - Lipid panel reflex to direct LDL Non-fasting  -  "simvastatin (ZOCOR) 20 MG tablet; Take 1 tablet (20 mg) by mouth At Bedtime    HTN, goal below 140/90  - amLODIPine (NORVASC) 5 MG tablet; Take 1 tablet (5 mg) by mouth daily  - hydrochlorothiazide (HYDRODIURIL) 25 MG tablet; Take 1 tablet (25 mg) by mouth daily    Viral hepatitis B chronic (H)  - Hep B Virus DNA Quant Real Time PCR      Patient has been advised of split billing requirements and indicates understanding: Yes      COUNSELING:   Special attention given to:        Regular exercise       Healthy diet/nutrition       The 10-year ASCVD risk score (Aleksey RAMIREZ, et al., 2019) is: 18.8%    Values used to calculate the score:      Age: 62 years      Sex: Male      Is Non- : No      Diabetic: Yes      Tobacco smoker: No      Systolic Blood Pressure: 135 mmHg      Is BP treated: Yes      HDL Cholesterol: 86 mg/dL      Total Cholesterol: 232 mg/dL      BMI:   Estimated body mass index is 26.85 kg/m  as calculated from the following:    Height as of this encounter: 1.613 m (5' 3.5\").    Weight as of this encounter: 69.9 kg (154 lb).   Weight management plan: diet and exercise.      He reports that he has never smoked. He has never used smokeless tobacco.        Ky Stone MD  [facility]  "

## 2023-01-13 LAB
ALBUMIN SERPL-MCNC: 3.8 G/DL (ref 3.4–5)
ALP SERPL-CCNC: 74 U/L (ref 40–150)
ALT SERPL W P-5'-P-CCNC: 55 U/L (ref 0–70)
ANION GAP SERPL CALCULATED.3IONS-SCNC: 9 MMOL/L (ref 3–14)
AST SERPL W P-5'-P-CCNC: 32 U/L (ref 0–45)
BILIRUB SERPL-MCNC: 0.9 MG/DL (ref 0.2–1.3)
BUN SERPL-MCNC: 14 MG/DL (ref 7–30)
CALCIUM SERPL-MCNC: 9.1 MG/DL (ref 8.5–10.1)
CHLORIDE BLD-SCNC: 104 MMOL/L (ref 94–109)
CHOLEST SERPL-MCNC: 232 MG/DL
CO2 SERPL-SCNC: 25 MMOL/L (ref 20–32)
CREAT SERPL-MCNC: 0.81 MG/DL (ref 0.66–1.25)
FASTING STATUS PATIENT QL REPORTED: NO
GFR SERPL CREATININE-BSD FRML MDRD: >90 ML/MIN/1.73M2
GLUCOSE BLD-MCNC: 103 MG/DL (ref 70–99)
HDLC SERPL-MCNC: 86 MG/DL
LDLC SERPL CALC-MCNC: 119 MG/DL
NONHDLC SERPL-MCNC: 146 MG/DL
POTASSIUM BLD-SCNC: 3.4 MMOL/L (ref 3.4–5.3)
PROT SERPL-MCNC: 8.1 G/DL (ref 6.8–8.8)
SODIUM SERPL-SCNC: 138 MMOL/L (ref 133–144)
TRIGL SERPL-MCNC: 133 MG/DL

## 2023-01-16 LAB
HBV DNA SERPL NAA+PROBE-ACNC: ABNORMAL IU/ML
HBV DNA SERPL NAA+PROBE-LOG IU: 4.4 {LOG_IU}/ML

## 2023-03-14 ENCOUNTER — OFFICE VISIT (OUTPATIENT)
Dept: URGENT CARE | Facility: URGENT CARE | Age: 63
End: 2023-03-14
Payer: COMMERCIAL

## 2023-03-14 VITALS
DIASTOLIC BLOOD PRESSURE: 82 MMHG | SYSTOLIC BLOOD PRESSURE: 137 MMHG | TEMPERATURE: 99.3 F | BODY MASS INDEX: 26.15 KG/M2 | HEART RATE: 89 BPM | WEIGHT: 150 LBS | OXYGEN SATURATION: 97 %

## 2023-03-14 DIAGNOSIS — R09.81 STUFFY NOSE: Primary | ICD-10-CM

## 2023-03-14 DIAGNOSIS — R50.9 FEVER, UNSPECIFIED: ICD-10-CM

## 2023-03-14 PROCEDURE — 99213 OFFICE O/P EST LOW 20 MIN: CPT

## 2023-03-14 RX ORDER — GUAIFENESIN 600 MG/1
1200 TABLET, EXTENDED RELEASE ORAL 2 TIMES DAILY
Qty: 30 TABLET | Refills: 0 | Status: SHIPPED | OUTPATIENT
Start: 2023-03-14 | End: 2024-06-07

## 2023-03-14 RX ORDER — ACETAMINOPHEN 500 MG
500-1000 TABLET ORAL EVERY 6 HOURS PRN
Qty: 30 TABLET | Refills: 0 | Status: SHIPPED | OUTPATIENT
Start: 2023-03-14

## 2023-03-14 NOTE — PATIENT INSTRUCTIONS
Take the Tylenol and Mucinex as prescribed.  Get plenty of rest and drink fluids.  Maximum dose of Tylenol is 4000mg in a 24 hour period of time.

## 2023-03-14 NOTE — LETTER
March 14, 2023      Jose Gaines  [address]  [place] [facility] [address]        To Whom It May Concern:    Jose Gaines  was seen on March 14th.  Please excuse him from work until March 18th due to illness.        Sincerely,        YESIKA Burris CNP

## 2023-03-14 NOTE — PROGRESS NOTES
ASSESSMENT:  (R09.81) Stuffy nose  (primary encounter diagnosis)  Plan: guaiFENesin (MUCINEX) 600 MG 12 hr tablet    (R50.9) Fever, unspecified  Plan: acetaminophen (TYLENOL) 500 MG tablet    PLAN:  Informed the patient to take the Tylenol Mucinex as prescribed.  We also discussed the need for him to get plenty of rest and drink fluids.  Informed him that the maximum dose Tylenol is 4000 mg in a 24-hour period of time.  Work note provided.  Discussed the need to return to clinic with any new or worsening symptoms.  Patient acknowledged his understanding of the above plan.    The use of [employer] services may have been used to construct the content in this note; any grammatical or spelling errors are non-intentional. Please contact the author of this note directly if you are in need of any clarification.      Douglas Rashid, YESIKA CNP      SUBJECTIVE:   Jose Gaines is a 62 year old male presenting with a chief complaint of fever, runny nose, stuffy nose, headache, body aches, fatigue and decreased appetite.  Onset of symptoms was 1 week ago.  Course of illness is same.    Patient denies: cough - non-productive, cough - productive, sore throat, vomiting and diarrhea  Treatment measures tried include NyQuil and Theraflu.    At home COVID test negative.      ROS:  Negative except noted above.    OBJECTIVE:  /82 (BP Location: Left arm, Patient Position: Sitting, Cuff Size: Adult Regular)   Pulse 89   Temp 99.3  F (37.4  C) (Oral)   Wt 68 kg (150 lb)   SpO2 97%   BMI 26.15 kg/m    GENERAL APPEARANCE: healthy, alert and no distress  EYES: EOMI,  PERRL, conjunctiva clear  HENT: ear canals and TM's normal.  Nose and mouth without ulcers, erythema or lesions  NECK: supple, nontender, no lymphadenopathy  RESP: lungs clear to auscultation - no rales, rhonchi or wheezes  CV: regular rates and rhythm, normal S1 S2, no murmur noted  SKIN: no suspicious lesions or rashes

## 2023-04-16 ENCOUNTER — HEALTH MAINTENANCE LETTER (OUTPATIENT)
Age: 63
End: 2023-04-16

## 2023-05-21 ENCOUNTER — TELEPHONE (OUTPATIENT)
Dept: FAMILY MEDICINE | Facility: CLINIC | Age: 63
End: 2023-05-21

## 2023-05-22 NOTE — TELEPHONE ENCOUNTER
Reason for Call:  Appointment Request    Patient requesting this type of appt:  Office visit    Requested provider: Ky Stone    Reason patient unable to be scheduled: Not within requested timeframe    When does patient want to be seen/preferred time: 3-7 days    Comments: Jose is hoping to be seen this week by Dr Stone he is having some GI and stomach problems and would like to be seen this week if possible.  Monday-Thursday.  Please call him back.      Could we send this information to you in [employer] or would you prefer to receive a phone call?:   Patient would prefer a phone call   Okay to leave a detailed message?: Yes at Cell number on file:    Telephone Information:   Mobile [phone]       Call taken on 5/21/2023 at 8:18 PM by Simi Perez

## 2023-09-16 ENCOUNTER — HEALTH MAINTENANCE LETTER (OUTPATIENT)
Age: 63
End: 2023-09-16

## 2024-01-26 DIAGNOSIS — I10 HTN, GOAL BELOW 140/90: ICD-10-CM

## 2024-01-28 RX ORDER — AMLODIPINE BESYLATE 5 MG/1
5 TABLET ORAL DAILY
Qty: 30 TABLET | Refills: 0 | Status: SHIPPED | OUTPATIENT
Start: 2024-01-28 | End: 2024-05-10

## 2024-01-29 DIAGNOSIS — I10 HTN, GOAL BELOW 140/90: ICD-10-CM

## 2024-01-29 RX ORDER — AMLODIPINE BESYLATE 5 MG/1
5 TABLET ORAL DAILY
Qty: 90 TABLET | OUTPATIENT
Start: 2024-01-29

## 2024-02-03 ENCOUNTER — HEALTH MAINTENANCE LETTER (OUTPATIENT)
Age: 64
End: 2024-02-03

## 2024-04-03 ENCOUNTER — OFFICE VISIT (OUTPATIENT)
Dept: URGENT CARE | Facility: URGENT CARE | Age: 64
End: 2024-04-03
Payer: COMMERCIAL

## 2024-04-03 ENCOUNTER — TELEPHONE (OUTPATIENT)
Dept: URGENT CARE | Facility: URGENT CARE | Age: 64
End: 2024-04-03

## 2024-04-03 VITALS
WEIGHT: 151.2 LBS | HEART RATE: 88 BPM | TEMPERATURE: 98.9 F | OXYGEN SATURATION: 96 % | DIASTOLIC BLOOD PRESSURE: 84 MMHG | BODY MASS INDEX: 26.36 KG/M2 | SYSTOLIC BLOOD PRESSURE: 146 MMHG | RESPIRATION RATE: 16 BRPM

## 2024-04-03 DIAGNOSIS — R50.9 FEVER, UNSPECIFIED FEVER CAUSE: ICD-10-CM

## 2024-04-03 DIAGNOSIS — J02.0 STREP THROAT: Primary | ICD-10-CM

## 2024-04-03 LAB
DEPRECATED S PYO AG THROAT QL EIA: POSITIVE
FLUAV AG SPEC QL IA: NEGATIVE
FLUBV AG SPEC QL IA: NEGATIVE

## 2024-04-03 PROCEDURE — 87804 INFLUENZA ASSAY W/OPTIC: CPT | Performed by: PHYSICIAN ASSISTANT

## 2024-04-03 PROCEDURE — 87880 STREP A ASSAY W/OPTIC: CPT | Performed by: PHYSICIAN ASSISTANT

## 2024-04-03 PROCEDURE — 99214 OFFICE O/P EST MOD 30 MIN: CPT | Performed by: PHYSICIAN ASSISTANT

## 2024-04-03 RX ORDER — PENICILLIN V POTASSIUM 500 MG/1
500 TABLET, FILM COATED ORAL 2 TIMES DAILY
Qty: 20 TABLET | Refills: 0 | Status: SHIPPED | OUTPATIENT
Start: 2024-04-03 | End: 2024-04-13

## 2024-04-03 ASSESSMENT — ENCOUNTER SYMPTOMS
SINUS PAIN: 0
PALPITATIONS: 0
ALLERGIC/IMMUNOLOGIC NEGATIVE: 1
MUSCULOSKELETAL NEGATIVE: 1
HEADACHES: 0
DYSURIA: 0
FEVER: 1
CARDIOVASCULAR NEGATIVE: 1
HEMATURIA: 0
COUGH: 1
SINUS PRESSURE: 0
VOMITING: 0
SORE THROAT: 1
ABDOMINAL PAIN: 0
SHORTNESS OF BREATH: 0
MYALGIAS: 0
GASTROINTESTINAL NEGATIVE: 1
DIARRHEA: 0
NEUROLOGICAL NEGATIVE: 1
CHILLS: 0
WHEEZING: 0
NAUSEA: 0
FREQUENCY: 0
CHEST TIGHTNESS: 0

## 2024-04-03 NOTE — TELEPHONE ENCOUNTER
Pt calling because he was in [facility] today and the provider prescribed  penicillin V (VEETID) 500 MG tablet.  Pt is currently at pharmacy and they do not have the prescription.    Per chart review the medication was sent to pharmacy but there is no Receipt confirmed by pharmacy.     Pt is waiting at pharmacy and requesting for med to be sent as soon as possible.        As I was typing up the note pharmacy confirmation was received at 4:38pm.  Called back pt and relayed that it was received.        Caroline Peterson, ABRILN, RN  [facility]

## 2024-04-03 NOTE — PROGRESS NOTES
Chief Complaint:     Chief Complaint   Patient presents with    Urgent Care     Sx started about 4 days ago     Fever    Cough    Pharyngitis       Results for orders placed or performed in visit on 04/03/24   Streptococcus A Rapid Screen w/Reflex to PCR - Clinic Collect     Status: Abnormal    Specimen: Throat; Swab   Result Value Ref Range    Group A Strep antigen Positive (A) Negative   Influenza A & B Antigen - Clinic Collect     Status: Normal    Specimen: Nose; Swab   Result Value Ref Range    Influenza A antigen Negative Negative    Influenza B antigen Negative Negative    Narrative    Test results must be correlated with clinical data. If necessary, results should be confirmed by a molecular assay or viral culture.       Medical Decision Making:    Vital signs reviewed by Marcin aGrcia PA-C  BP (!) 146/84 (BP Location: Left arm, Patient Position: Sitting, Cuff Size: Adult Regular)   Pulse 88   Temp 98.9  F (37.2  C) (Tympanic)   Resp 16   Wt 68.6 kg (151 lb 3.2 oz)   SpO2 96%   BMI 26.36 kg/m      Differential Diagnosis:  URI Adult/Peds:  Bronchitis-viral, Influenza, Pneumonia, Strep pharyngitis, Tonsilitis, Viral pharyngitis, Viral syndrome, and Viral upper respiratory illness        ASSESSMENT    1. Strep throat    2. Fever, unspecified fever cause        PLAN    Patient is in no acute distress.    Temp is 98.9 in clinic today, lung sounds were clear, and O2 sats at 96% on RA.    RST was positive.  Rx for Penicillin sent in.    Influenza was negative.  Rest, Push fluids, vaporizer, elevation of head of bed.  Ibuprofen and or Tylenol for any fever or body aches.  Over the counter cough suppressant- PRN- as discussed.   If symptoms worsen, recheck immediately otherwise follow up with your PCP in 1 week if symptoms are not improving.  Worrisome symptoms discussed with instructions to go to the ED.  Patient verbalized understanding and agreed with this plan.    32 minutes was spent in the care of this  patient including chart review, HPI, ROS, PE, review of plan, and placing of orders.      Labs:    Results for orders placed or performed in visit on 04/03/24   Streptococcus A Rapid Screen w/Reflex to PCR - Clinic Collect     Status: Abnormal    Specimen: Throat; Swab   Result Value Ref Range    Group A Strep antigen Positive (A) Negative   Influenza A & B Antigen - Clinic Collect     Status: Normal    Specimen: Nose; Swab   Result Value Ref Range    Influenza A antigen Negative Negative    Influenza B antigen Negative Negative    Narrative    Test results must be correlated with clinical data. If necessary, results should be confirmed by a molecular assay or viral culture.        Vital signs reviewed by Marcin Garcia PA-C  BP (!) 146/84 (BP Location: Left arm, Patient Position: Sitting, Cuff Size: Adult Regular)   Pulse 88   Temp 98.9  F (37.2  C) (Tympanic)   Resp 16   Wt 68.6 kg (151 lb 3.2 oz)   SpO2 96%   BMI 26.36 kg/m      Current Meds      Current Outpatient Medications:     penicillin V (VEETID) 500 MG tablet, Take 1 tablet (500 mg) by mouth 2 times daily for 10 days, Disp: 20 tablet, Rfl: 0    acetaminophen (TYLENOL) 500 MG tablet, Take 1-2 tablets (500-1,000 mg) by mouth every 6 hours as needed for mild pain or fever, Disp: 30 tablet, Rfl: 0    amLODIPine (NORVASC) 5 MG tablet, TAKE 1 TABLET(5 MG) BY MOUTH DAILY, Disp: 30 tablet, Rfl: 0    aspirin 81 MG EC tablet, Take 81 mg by mouth daily, Disp: , Rfl:     atorvastatin (LIPITOR) 10 MG tablet, Take 1 tablet (10 mg) by mouth daily, Disp: 60 tablet, Rfl: 0    diclofenac (VOLTAREN) 1 % topical gel, Place 4 g onto the skin 4 times daily, Disp: 100 g, Rfl: 11    fluticasone (FLONASE) 50 MCG/ACT nasal spray, SHAKE LQ AND U 1 SPR IEN QD, Disp: , Rfl:     guaiFENesin (MUCINEX) 600 MG 12 hr tablet, Take 2 tablets (1,200 mg) by mouth 2 times daily, Disp: 30 tablet, Rfl: 0    hydrochlorothiazide (HYDRODIURIL) 25 MG tablet, Take 1 tablet (25 mg) by mouth  daily, Disp: 90 tablet, Rfl: 3    metFORMIN (GLUCOPHAGE XR) 500 MG 24 hr tablet, Take 1 tablet (500 mg) by mouth daily (with dinner), Disp: 90 tablet, Rfl: 3    multivitamin  with lutein (OCUVITE WITH LTEIN) CAPS per capsule, Take 1 capsule by mouth daily, Disp: 30 capsule, Rfl: 11    naproxen (NAPROSYN) 500 MG tablet, Take 1 tablet (500 mg) by mouth 2 times daily (with meals), Disp: 20 tablet, Rfl: 0    olopatadine (PATANOL) 0.1 % ophthalmic solution, Place 1 drop into the right eye 2 times daily, Disp: 1 mL, Rfl: 11    simvastatin (ZOCOR) 20 MG tablet, Take 1 tablet (20 mg) by mouth At Bedtime, Disp: 90 tablet, Rfl: 1    triamterene-HCTZ (DYAZIDE) 37.5-25 MG capsule, TAKE 1 CAPSULE BY MOUTH EVERY MORNING, Disp: 30 capsule, Rfl: 0    valACYclovir (VALTREX) 500 MG tablet, Take 1 tablet (500 mg) by mouth daily, Disp: 90 tablet, Rfl: 3      Respiratory History    occasional episodes of bronchitis      SUBJECTIVE    HPI: Jose Gaines is an 63 year old male who presents with chest congestion, cough nonproductive, occasional, fever, and sore throat.  Symptoms began 4  days ago and has unchanged.  There is no shortness of breath, wheezing, and chest pain.  Patient is eating and drinking well.  No fever, nausea, vomiting, or diarrhea.    Patient denies any recent travel or exposure to known COVID positive tested individual.      ROS:     Review of Systems   Constitutional:  Positive for fever. Negative for chills.   HENT:  Positive for congestion and sore throat. Negative for ear discharge, ear pain, sinus pressure and sinus pain.    Respiratory:  Positive for cough. Negative for chest tightness, shortness of breath and wheezing.    Cardiovascular: Negative.  Negative for chest pain and palpitations.   Gastrointestinal: Negative.  Negative for abdominal pain, diarrhea, nausea and vomiting.   Genitourinary:  Negative for dysuria, frequency, hematuria and urgency.   Musculoskeletal: Negative.  Negative for myalgias.    Skin:  Negative for rash.   Allergic/Immunologic: Negative.  Negative for immunocompromised state.   Neurological: Negative.  Negative for headaches.         Family History   Family History   Problem Relation Age of Onset    Diabetes Father 50    Diabetes Brother         unknown age of onset    Colon Cancer No family hx of     Colitis No family hx of         Problem history  Patient Active Problem List   Diagnosis    Seasonal allergic rhinitis    CARDIOVASCULAR SCREENING; LDL GOAL LESS THAN 130    Chronic hepatitis B (H)    HTN, goal below 140/80    Hyperlipidemia LDL goal <100    Internal hemorrhoid    Language barrier    Oral ulcer    Overweight    Pain of meniscus of right knee    Clonic hemifacial spasm, right    HSV-2 seropositive        Allergies  No Known Allergies     Social History  Social History     Socioeconomic History    Marital status:      Spouse name: Not on file    Number of children: Not on file    Years of education: Not on file    Highest education level: Not on file   Occupational History    Not on file   Tobacco Use    Smoking status: Never    Smokeless tobacco: Never   Substance and Sexual Activity    Alcohol use: Yes    Drug use: No    Sexual activity: Yes     Partners: Female   Other Topics Concern    Parent/sibling w/ CABG, MI or angioplasty before 65F 55M? Not Asked   Social History Narrative    Not on file     Social Determinants of Health     Financial Resource Strain: Not on file   Food Insecurity: Not on file   Transportation Needs: Not on file   Physical Activity: Not on file   Stress: Not on file   Social Connections: Not on file   Interpersonal Safety: Not on file   Housing Stability: Not on file        OBJECTIVE     Vital signs reviewed by Marcin Garcia PA-C  BP (!) 146/84 (BP Location: Left arm, Patient Position: Sitting, Cuff Size: Adult Regular)   Pulse 88   Temp 98.9  F (37.2  C) (Tympanic)   Resp 16   Wt 68.6 kg (151 lb 3.2 oz)   SpO2 96%   BMI 26.36 kg/m        Physical Exam  Vitals reviewed.   Constitutional:       General: He is not in acute distress.     Appearance: He is well-developed. He is not ill-appearing, toxic-appearing or diaphoretic.   HENT:      Head: Normocephalic and atraumatic.      Right Ear: Hearing, tympanic membrane, ear canal and external ear normal. No drainage, swelling or tenderness. Tympanic membrane is not perforated, erythematous, retracted or bulging.      Left Ear: Hearing, tympanic membrane, ear canal and external ear normal. No drainage, swelling or tenderness. Tympanic membrane is not perforated, erythematous, retracted or bulging.      Nose: Congestion and rhinorrhea present. No nasal tenderness or mucosal edema.      Right Turbinates: Not enlarged or swollen.      Left Turbinates: Not enlarged or swollen.      Right Sinus: No maxillary sinus tenderness or frontal sinus tenderness.      Left Sinus: No maxillary sinus tenderness or frontal sinus tenderness.      Mouth/Throat:      Pharynx: Posterior oropharyngeal erythema present. No pharyngeal swelling, oropharyngeal exudate or uvula swelling.      Tonsils: No tonsillar exudate. 0 on the right. 0 on the left.   Eyes:      General: Lids are normal.         Right eye: No discharge.         Left eye: No discharge.      Conjunctiva/sclera: Conjunctivae normal.      Right eye: Right conjunctiva is not injected. No exudate.     Left eye: Left conjunctiva is not injected. No exudate.     Pupils: Pupils are equal, round, and reactive to light.   Cardiovascular:      Rate and Rhythm: Normal rate and regular rhythm.      Heart sounds: Normal heart sounds. No murmur heard.     No friction rub. No gallop.   Pulmonary:      Effort: Pulmonary effort is normal. No accessory muscle usage, respiratory distress or retractions.      Breath sounds: Normal breath sounds and air entry. No stridor, decreased air movement or transmitted upper airway sounds. No decreased breath sounds, wheezing, rhonchi or  rales.   Chest:      Chest wall: No tenderness.   Abdominal:      General: Bowel sounds are normal. There is no distension.      Palpations: Abdomen is soft. Abdomen is not rigid. There is no mass.      Tenderness: There is no abdominal tenderness. There is no guarding or rebound.   Musculoskeletal:         General: Normal range of motion.      Cervical back: Normal range of motion and neck supple.   Lymphadenopathy:      Head:      Right side of head: No submental, submandibular, tonsillar, preauricular or posterior auricular adenopathy.      Left side of head: No submental, submandibular, tonsillar, preauricular or posterior auricular adenopathy.      Cervical:      Right cervical: No superficial or posterior cervical adenopathy.     Left cervical: No superficial or posterior cervical adenopathy.   Skin:     General: Skin is warm.      Capillary Refill: Capillary refill takes less than 2 seconds.   Neurological:      Mental Status: He is alert and oriented to person, place, and time.      Cranial Nerves: No cranial nerve deficit.      Sensory: No sensory deficit.      Motor: No abnormal muscle tone.      Coordination: Coordination normal.      Deep Tendon Reflexes: Reflexes normal.   Psychiatric:         Behavior: Behavior normal. Behavior is cooperative.         Thought Content: Thought content normal.         Judgment: Judgment normal.           Marcin Garcia PA-C  4/3/2024, 3:06 PM

## 2024-04-13 ENCOUNTER — HEALTH MAINTENANCE LETTER (OUTPATIENT)
Age: 64
End: 2024-04-13

## 2024-04-22 ENCOUNTER — TELEPHONE (OUTPATIENT)
Dept: FAMILY MEDICINE | Facility: CLINIC | Age: 64
End: 2024-04-22
Payer: COMMERCIAL

## 2024-04-22 NOTE — TELEPHONE ENCOUNTER
Called and spoke to pt.   States that he is still having a slight sore throat.  States that he wanted to get another refill of the medication to fully treat the infection.  No SOB, wheezing, trouble swallowing.    Relayed to pt that he needs to follow up with a provider if he is still having symptoms.    Pt understood and assisted in scheduling a visit.    TRACY Gloria, RN  [facility]

## 2024-04-22 NOTE — TELEPHONE ENCOUNTER
New Medication Request    Contacts         Type Contact Phone/Fax    04/22/2024 03:12 PM CDT Phone (Incoming) Jose Gaines (Self) [phone] (M)            What medication are you requesting?: penicillin V (VEETID) 500 MG tablet     Reason for medication request: same symptoms.    Have you taken this medication before?: Yes:    Controlled Substance Agreement on file:   CSA -- Patient Level:    CSA: None found at the patient level.         Patient offered an appointment? No    Preferred Pharmacy:  [id number] - [place] - [address] [facility] [address]  [place] [facility] [address]  Phone: [phone] Fax: [phone]      Could we send this information to you in [employer] or would you prefer to receive a phone call?:   Patient would prefer a phone call   Okay to leave a detailed message?: Yes at Cell number on file:    Telephone Information:   Mobile [phone]

## 2024-04-23 ENCOUNTER — VIRTUAL VISIT (OUTPATIENT)
Dept: FAMILY MEDICINE | Facility: CLINIC | Age: 64
End: 2024-04-23
Payer: COMMERCIAL

## 2024-04-23 DIAGNOSIS — J02.9 SORE THROAT: Primary | ICD-10-CM

## 2024-04-23 PROCEDURE — 99442 PR PHYSICIAN TELEPHONE EVALUATION 11-20 MIN: CPT | Performed by: FAMILY MEDICINE

## 2024-04-23 NOTE — PROGRESS NOTES
Jose is a 63 year old who is being evaluated via a billable telephone visit.    What phone number would you like to be contacted at? [phone]   How would you like to obtain your AVS? Rafahart  Originating Location (pt. Location): Home    Distant Location (provider location):  On-site    Assessment & Plan     Sore throat  -Jose presented as telephone visit.  He has been having sore throat and intermittent fever for the past 3 days.  -History of positive strep 2 weeks ago, took penicillin for 10 days.    PLAN:  -Since telephone visit, examination could not be done.  -Recommended strep testing.  If symptoms are still bothersome after a week schedule clinic visit.  If any concerns for infection/tonsillitis or peritonsillar abscess -notified warning signs to follow-up in the ER.    - Streptococcus A Rapid Screen w/Reflex to PCR - Clinic Collect      25 minutes spent by me on the date of the encounter doing chart review, patient visit, and documentation             Subjective   Jose is a 63 year old, presenting for the following health issues:  Pharyngitis        4/23/2024     4:03 PM   Additional Questions   Roomed by Alexander GARCIA     HPI     Acute Illness  Acute illness concerns: Sore Throat  Onset/Duration: 3 weeks   Symptoms:  Fever: YES  Chills/Sweats: YES  Headache (location?): No  Sinus Pressure: No  Conjunctivitis:  No  Ear Pain: no  Rhinorrhea: YES  Congestion: No  Sore Throat: YES  Cough: YES  Wheeze: No  Decreased Appetite: No  Nausea: No  Vomiting: No  Diarrhea: No  Dysuria/Freq.: No  Dysuria or Hematuria: No  Fatigue/Achiness: YES  Sick/Strep Exposure: No  Therapies tried and outcome: Antibiotics from urgent care        Review of Systems  Constitutional, HEENT, cardiovascular, pulmonary, gi and gu systems are negative, except as otherwise noted.      Objective           Vitals:  No vitals were obtained today due to virtual visit.    Physical Exam   General: Alert and no distress //Respiratory: No  audible wheeze, cough, or shortness of breath // Psychiatric:  Appropriate affect, tone, and pace of words            Phone call duration: 20 minutes  Signed Electronically by: James Wong MD

## 2024-05-09 DIAGNOSIS — I10 HTN, GOAL BELOW 140/90: ICD-10-CM

## 2024-05-10 DIAGNOSIS — I10 HTN, GOAL BELOW 140/90: ICD-10-CM

## 2024-05-10 RX ORDER — AMLODIPINE BESYLATE 5 MG/1
5 TABLET ORAL DAILY
Qty: 30 TABLET | Refills: 0 | Status: SHIPPED | OUTPATIENT
Start: 2024-05-10 | End: 2024-06-04

## 2024-05-10 RX ORDER — HYDROCHLOROTHIAZIDE 25 MG/1
25 TABLET ORAL DAILY
Qty: 30 TABLET | Refills: 0 | Status: SHIPPED | OUTPATIENT
Start: 2024-05-10 | End: 2024-06-07

## 2024-05-10 RX ORDER — HYDROCHLOROTHIAZIDE 25 MG/1
25 TABLET ORAL DAILY
Qty: 90 TABLET | OUTPATIENT
Start: 2024-05-10

## 2024-06-03 DIAGNOSIS — I10 HTN, GOAL BELOW 140/90: ICD-10-CM

## 2024-06-04 RX ORDER — AMLODIPINE BESYLATE 5 MG/1
5 TABLET ORAL DAILY
Qty: 30 TABLET | Refills: 0 | Status: SHIPPED | OUTPATIENT
Start: 2024-06-04 | End: 2024-06-07

## 2024-06-07 ENCOUNTER — OFFICE VISIT (OUTPATIENT)
Dept: FAMILY MEDICINE | Facility: CLINIC | Age: 64
End: 2024-06-07
Payer: COMMERCIAL

## 2024-06-07 VITALS
DIASTOLIC BLOOD PRESSURE: 88 MMHG | WEIGHT: 151 LBS | RESPIRATION RATE: 16 BRPM | TEMPERATURE: 97.6 F | HEIGHT: 64 IN | HEART RATE: 72 BPM | OXYGEN SATURATION: 96 % | BODY MASS INDEX: 25.78 KG/M2 | SYSTOLIC BLOOD PRESSURE: 133 MMHG

## 2024-06-07 DIAGNOSIS — Z13.89 SCREENING FOR DIABETIC PERIPHERAL NEUROPATHY: ICD-10-CM

## 2024-06-07 DIAGNOSIS — B18.1 CHRONIC HEPATITIS B (H): ICD-10-CM

## 2024-06-07 DIAGNOSIS — I10 HTN, GOAL BELOW 140/90: ICD-10-CM

## 2024-06-07 DIAGNOSIS — E11.9 TYPE 2 DIABETES MELLITUS WITHOUT COMPLICATION, WITHOUT LONG-TERM CURRENT USE OF INSULIN (H): ICD-10-CM

## 2024-06-07 DIAGNOSIS — Z11.59 NEED FOR HEPATITIS C SCREENING TEST: ICD-10-CM

## 2024-06-07 DIAGNOSIS — Z00.00 ENCOUNTER FOR ANNUAL PHYSICAL EXAM: Primary | ICD-10-CM

## 2024-06-07 DIAGNOSIS — E78.5 HYPERLIPIDEMIA LDL GOAL <100: ICD-10-CM

## 2024-06-07 LAB — HBA1C MFR BLD: 6.1 % (ref 0–5.6)

## 2024-06-07 PROCEDURE — 86803 HEPATITIS C AB TEST: CPT | Performed by: INTERNAL MEDICINE

## 2024-06-07 PROCEDURE — 80053 COMPREHEN METABOLIC PANEL: CPT | Performed by: INTERNAL MEDICINE

## 2024-06-07 PROCEDURE — 82248 BILIRUBIN DIRECT: CPT | Performed by: INTERNAL MEDICINE

## 2024-06-07 PROCEDURE — 82570 ASSAY OF URINE CREATININE: CPT | Performed by: INTERNAL MEDICINE

## 2024-06-07 PROCEDURE — 82043 UR ALBUMIN QUANTITATIVE: CPT | Performed by: INTERNAL MEDICINE

## 2024-06-07 PROCEDURE — 83036 HEMOGLOBIN GLYCOSYLATED A1C: CPT | Performed by: INTERNAL MEDICINE

## 2024-06-07 PROCEDURE — 36415 COLL VENOUS BLD VENIPUNCTURE: CPT | Performed by: INTERNAL MEDICINE

## 2024-06-07 PROCEDURE — 99396 PREV VISIT EST AGE 40-64: CPT | Performed by: INTERNAL MEDICINE

## 2024-06-07 PROCEDURE — 80061 LIPID PANEL: CPT | Performed by: INTERNAL MEDICINE

## 2024-06-07 RX ORDER — ATORVASTATIN CALCIUM 10 MG/1
10 TABLET, FILM COATED ORAL DAILY
Qty: 90 TABLET | Refills: 3 | Status: SHIPPED | OUTPATIENT
Start: 2024-06-07

## 2024-06-07 RX ORDER — METFORMIN HCL 500 MG
500 TABLET, EXTENDED RELEASE 24 HR ORAL
Qty: 90 TABLET | Refills: 1 | Status: SHIPPED | OUTPATIENT
Start: 2024-06-07

## 2024-06-07 RX ORDER — BENAZEPRIL/HYDROCHLOROTHIAZIDE 20 MG-25MG
1 TABLET ORAL DAILY
Qty: 90 TABLET | Refills: 1 | Status: SHIPPED | OUTPATIENT
Start: 2024-06-07

## 2024-06-07 SDOH — HEALTH STABILITY: PHYSICAL HEALTH: ON AVERAGE, HOW MANY MINUTES DO YOU ENGAGE IN EXERCISE AT THIS LEVEL?: 10 MIN

## 2024-06-07 SDOH — HEALTH STABILITY: PHYSICAL HEALTH: ON AVERAGE, HOW MANY DAYS PER WEEK DO YOU ENGAGE IN MODERATE TO STRENUOUS EXERCISE (LIKE A BRISK WALK)?: 2 DAYS

## 2024-06-07 ASSESSMENT — SOCIAL DETERMINANTS OF HEALTH (SDOH): HOW OFTEN DO YOU GET TOGETHER WITH FRIENDS OR RELATIVES?: ONCE A WEEK

## 2024-06-07 ASSESSMENT — PAIN SCALES - GENERAL: PAINLEVEL: MODERATE PAIN (4)

## 2024-06-07 NOTE — PATIENT INSTRUCTIONS
At [facility], we strive to deliver an exceptional experience to you, every time we see you. If you receive a survey, please complete it as we do value your feedback.  If you have MyChart, you can expect to receive results automatically within 24 hours of their completion.  Your provider will send a note interpreting your results as well.   If you do not have MyChart, you should receive your results in about a week by mail.    Your care team:                            Family Medicine Internal Medicine   MD Ky Crawley, MD Carito Bianchi, MD Juliet Colbert, PA-C    Siddharth Flowers, MD Pediatrics   Conor Marroquin, PA-C  Velma Tse, MD Theresa Alexandra APRYESIKA Mitchell CNP, CNP, MD James Wong, MD Marcelle Nettles, CNP  Same-Day (No follow up visit)   Papi Woodard, ABELARDO Solano, PA-C    Shawna Burgess PA-C     Clinic hours: Monday - Thursday 7 am-6 pm; Fridays 7 am-5 pm.   Urgent care: Monday - Friday 10 am- 8 pm; Saturday and Sunday 9 am-5 pm.    Clinic: [phone]       [facility]: Monday - Thursday 8 am - 7 pm; Friday 8 am - 6 pm  [facility]: [phone]

## 2024-06-07 NOTE — PROGRESS NOTES
Preventive Care Visit  [facility]  Ky Stone MD, Internal Medicine  Jun 7, 2024  {Provider  Link to SmartSet :512217}    {PROVIDER CHARTING PREFERENCE:325888}    Yolanda Garcia is a 64 year old, presenting for the following:  No chief complaint on file.        6/7/2024    11:22 AM   Additional Questions   Roomed by shira   Accompanied by self        Health Care Directive  Patient does not have a Health Care Directive or Living Will: {ADVANCE_DIRECTIVE_STATUS:251885}    HPI  ***  {MA/LPN/RN Pre-Provider Visit Orders- hCG/UA/Strep (Optional):738636}  {SUPERLIST (Optional):720122}  {additonal problems for provider to add (Optional):074976}      6/7/2024   General Health   How would you rate your overall physical health? Good   Feel stress (tense, anxious, or unable to sleep) Only a little   (!) STRESS CONCERN      6/7/2024   Nutrition   Three or more servings of calcium each day? (!) NO   Diet: Breakfast skipped   How many servings of fruit and vegetables per day? (!) 0-1   How many sweetened beverages each day? 0-1         6/7/2024   Exercise   Days per week of moderate/strenous exercise 2 days   Average minutes spent exercising at this level 10 min   (!) EXERCISE CONCERN      6/7/2024   Social Factors   Frequency of gathering with friends or relatives Once a week   Worry food won't last until get money to buy more No   Food not last or not have enough money for food? No   Do you have housing?  Yes   Are you worried about losing your housing? No   Lack of transportation? No   Unable to get utilities (heat,electricity)? No         6/7/2024   Fall Risk   Fallen 2 or more times in the past year? No   Trouble with walking or balance? No          6/7/2024   Dental   Dentist two times every year? Yes         6/7/2024   TB Screening   Were you born outside of the [place]? Yes         Today's PHQ-2 Score:       6/7/2024    11:21 AM   PHQ-2 ( 1999 [employer])   Q1: Little interest or  "pleasure in doing things 0   Q2: Feeling down, depressed or hopeless 0   PHQ-2 Score 0   Q1: Little interest or pleasure in doing things Not at all   Q2: Feeling down, depressed or hopeless Not at all   PHQ-2 Score 0           6/7/2024   Substance Use   Alcohol more than 3/day or more than 7/wk No   Do you use any other substances recreationally? No     Social History     Tobacco Use    Smoking status: Never    Smokeless tobacco: Never   Substance Use Topics    Alcohol use: Yes    Drug use: No     {Provider  If there are gaps in the social history shown above, please follow the link to update and then refresh the note Link to Social and Substance History :270697}      6/7/2024   STI Screening   New sexual partner(s) since last STI/HIV test? No   Last PSA:   PSA   Date Value Ref Range Status   03/04/2019 0.67 0 - 4 ug/L Final     Comment:     Assay Method:  Chemiluminescence using Siemens Vista analyzer     ASCVD Risk   The 10-year ASCVD risk score (Aleksey RAMIREZ, et al., 2019) is: 25%    Values used to calculate the score:      Age: 64 years      Sex: Male      Is Non- : No      Diabetic: Yes      Tobacco smoker: No      Systolic Blood Pressure: 146 mmHg      Is BP treated: Yes      HDL Cholesterol: 86 mg/dL      Total Cholesterol: 232 mg/dL    {Link to Fracture Risk Assessment Tool (Optional):473957}    {Provider  Use the storyboard to review patient history, after sections have been marked as reviewed, refresh note to capture documentation:340968}   Reviewed and updated as needed this visit by Provider                    {HISTORY OPTIONS (Optional):859765}    {ROS Picklists (Optional):151774}     Objective    Exam  There were no vitals taken for this visit.   Estimated body mass index is 26.36 kg/m  as calculated from the following:    Height as of 1/12/23: 1.613 m (5' 3.5\").    Weight as of 4/3/24: 68.6 kg (151 lb 3.2 oz).    Physical Exam  {Exam Choices " (Optional):670632}        Signed Electronically by: Ky Stone MD  {Email feedback regarding this note to [email or web address]   :331311}

## 2024-06-08 LAB
ALBUMIN SERPL BCG-MCNC: 4.4 G/DL (ref 3.5–5.2)
ALP SERPL-CCNC: 59 U/L (ref 40–150)
ALT SERPL W P-5'-P-CCNC: 43 U/L (ref 0–70)
ANION GAP SERPL CALCULATED.3IONS-SCNC: 10 MMOL/L (ref 7–15)
AST SERPL W P-5'-P-CCNC: 36 U/L (ref 0–45)
BILIRUB DIRECT SERPL-MCNC: <0.2 MG/DL (ref 0–0.3)
BILIRUB SERPL-MCNC: 0.7 MG/DL
BUN SERPL-MCNC: 12.3 MG/DL (ref 8–23)
CALCIUM SERPL-MCNC: 8.9 MG/DL (ref 8.8–10.2)
CHLORIDE SERPL-SCNC: 101 MMOL/L (ref 98–107)
CHOLEST SERPL-MCNC: 215 MG/DL
CREAT SERPL-MCNC: 0.8 MG/DL (ref 0.67–1.17)
CREAT UR-MCNC: 61.9 MG/DL
DEPRECATED HCO3 PLAS-SCNC: 26 MMOL/L (ref 22–29)
EGFRCR SERPLBLD CKD-EPI 2021: >90 ML/MIN/1.73M2
FASTING STATUS PATIENT QL REPORTED: YES
FASTING STATUS PATIENT QL REPORTED: YES
GLUCOSE SERPL-MCNC: 96 MG/DL (ref 70–99)
HCV AB SERPL QL IA: NONREACTIVE
HDLC SERPL-MCNC: 81 MG/DL
LDLC SERPL CALC-MCNC: 118 MG/DL
MICROALBUMIN UR-MCNC: <12 MG/L
MICROALBUMIN/CREAT UR: NORMAL MG/G{CREAT}
NONHDLC SERPL-MCNC: 134 MG/DL
POTASSIUM SERPL-SCNC: 3.7 MMOL/L (ref 3.4–5.3)
PROT SERPL-MCNC: 7.7 G/DL (ref 6.4–8.3)
SODIUM SERPL-SCNC: 137 MMOL/L (ref 135–145)
TRIGL SERPL-MCNC: 78 MG/DL

## 2024-06-16 NOTE — PROGRESS NOTES
SUBJECTIVE:   Jose is a 64 year old male who comes in today for an annual physical exam.    Health Care Directive  Patient does not have a Health Care Directive or Living Will: Not discussed during today's visit.    Today's PHQ-2 Score:       6/7/2024    11:21 AM   PHQ-2 ( 1999 [employer])   Q1: Little interest or pleasure in doing things 0   Q2: Feeling down, depressed or hopeless 0   PHQ-2 Score 0   Q1: Little interest or pleasure in doing things Not at all   Q2: Feeling down, depressed or hopeless Not at all   PHQ-2 Score 0         6/7/2024   General Health   How would you rate your overall physical health? Good   Feel stress (tense, anxious, or unable to sleep) Only a little   (!) STRESS CONCERN      6/7/2024   Nutrition   Three or more servings of calcium each day? (!) NO   Diet: Breakfast skipped   How many servings of fruit and vegetables per day? (!) 0-1   How many sweetened beverages each day? 0-1         6/7/2024   Exercise   Days per week of moderate/strenous exercise 2 days   Average minutes spent exercising at this level 10 min   (!) EXERCISE CONCERN      6/7/2024   Social Factors   Frequency of gathering with friends or relatives Once a week   Worry food won't last until get money to buy more No   Food not last or not have enough money for food? No   Do you have housing?  Yes   Are you worried about losing your housing? No   Lack of transportation? No   Unable to get utilities (heat,electricity)? No         6/7/2024   Fall Risk   Fallen 2 or more times in the past year? No   Trouble with walking or balance? No          6/7/2024   Dental   Dentist two times every year? Yes         6/7/2024   TB Screening   Were you born outside of the [place]? Yes           6/7/2024   Substance Use   Alcohol more than 3/day or more than 7/wk No   Do you use any other substances recreationally? No     Social History     Tobacco Use    Smoking status: Never    Smokeless tobacco: Never   Substance Use Topics    Alcohol use:  Yes    Drug use: No         6/7/2024   STI Screening   New sexual partner(s) since last STI/HIV test? No   Last PSA:   PSA   Date Value Ref Range Status   03/04/2019 0.67 0 - 4 ug/L Final     Comment:     Assay Method:  Chemiluminescence using Siemens Vista analyzer     ASCVD Risk   The 10-year ASCVD risk score (Aleksey RAMIREZ, et al., 2019) is: 25%    Values used to calculate the score:      Age: 64 years      Sex: Male      Is Non- : No      Diabetic: Yes      Tobacco smoker: No      Systolic Blood Pressure: 146 mmHg      Is BP treated: Yes      HDL Cholesterol: 86 mg/dL      Total Cholesterol: 232 mg/dL                    Labs reviewed in EPIC  BP Readings from Last 3 Encounters:   06/07/24 133/88   04/03/24 (!) 146/84   03/14/23 137/82    Wt Readings from Last 3 Encounters:   06/07/24 68.5 kg (151 lb)   04/03/24 68.6 kg (151 lb 3.2 oz)   03/14/23 68 kg (150 lb)                  Patient Active Problem List   Diagnosis    Seasonal allergic rhinitis    CARDIOVASCULAR SCREENING; LDL GOAL LESS THAN 130    Chronic hepatitis B (H)    HTN, goal below 140/90    Hyperlipidemia LDL goal <100    Internal hemorrhoid    Language barrier    Oral ulcer    Overweight    Pain of meniscus of right knee    Clonic hemifacial spasm, right    HSV-2 seropositive    Type 2 diabetes mellitus without complication, without long-term current use of insulin (H)     Past Surgical History:   Procedure Laterality Date    SURGICAL HISTORY OF -   1/31/05 & 8/05    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 Reviewed health maintenance and updated orders accordingly - Yes      Reviewed and updated as needed this visit by clinical staff    Allergies  Meds  Problems             Reviewed and updated as needed this visit by Provider    Allergies  Meds  Problems               Review of Systems  CONSTITUTIONAL: NEGATIVE for fever, chills, change in weight  INTEGUMENTARY/SKIN: NEGATIVE for worrisome rashes, moles or lesions  EYES: NEGATIVE for vision changes or irritation  ENT/MOUTH: NEGATIVE for ear, mouth and throat problems  RESP: NEGATIVE for significant cough or SOB  CV: NEGATIVE for chest pain, palpitations or peripheral edema  GI: NEGATIVE for nausea, abdominal pain, heartburn, or change in bowel habits  GI: POSITIVE for chronic Hepatitis B.  : NEGATIVE for frequency, dysuria, or hematuria  MUSCULOSKELETAL: NEGATIVE for significant arthralgias or myalgia  NEURO: NEGATIVE for weakness, dizziness or paresthesias  ENDOCRINE: NEGATIVE for temperature intolerance, skin/hair changes  HEME: NEGATIVE for bleeding problems  PSYCHIATRIC: NEGATIVE for changes in mood or affect    OBJECTIVE:   /88   Pulse 72   Temp 97.6  F (36.4  C) (Tympanic)   Resp 16   Ht 1.619 m (5' 3.75\")   Wt 68.5 kg (151 lb)   SpO2 96%   BMI 26.12 kg/m     Estimated body mass index is 26.12 kg/m  as calculated from the following:    Height as of this encounter: 1.619 m (5' 3.75\").    Weight as of this encounter: 68.5 kg (151 lb).  Physical Exam  GENERAL: alert and no distress  EYES: Eyes grossly normal to inspection and conjunctivae and sclerae normal  HENT: normal cephalic/atraumatic and oral mucous membranes moist  RESP: lungs clear to auscultation - no rales, rhonchi or wheezes  CV: regular rates and rhythm, peripheral pulses strong, and no peripheral edema  ABDOMEN: soft, nontender, no hepatosplenomegaly, no masses and bowel sounds normal  MS: no gross musculoskeletal defects noted, no " edema  NEURO: Normal strength and tone, mentation intact and speech normal  PSYCH: mentation appears normal, affect normal/bright  FOOT. Dorsalis pedis 2+ bilaterally.    Diagnostic Test Results:  Results for orders placed or performed in visit on 06/07/24   Albumin Random Urine Quantitative with Creat Ratio     Status: None   Result Value Ref Range    Creatinine Urine mg/dL 61.9 mg/dL    Albumin Urine mg/L <12.0 mg/L    Albumin Urine mg/g Cr     HEMOGLOBIN A1C     Status: Abnormal   Result Value Ref Range    Hemoglobin A1C 6.1 (H) 0.0 - 5.6 %   BASIC METABOLIC PANEL     Status: None   Result Value Ref Range    Sodium 137 135 - 145 mmol/L    Potassium 3.7 3.4 - 5.3 mmol/L    Chloride 101 98 - 107 mmol/L    Carbon Dioxide (CO2) 26 22 - 29 mmol/L    Anion Gap 10 7 - 15 mmol/L    Urea Nitrogen 12.3 8.0 - 23.0 mg/dL    Creatinine 0.80 0.67 - 1.17 mg/dL    GFR Estimate >90 >60 mL/min/1.73m2    Calcium 8.9 8.8 - 10.2 mg/dL    Glucose 96 70 - 99 mg/dL    Patient Fasting > 8hrs? Yes    Lipid panel reflex to direct LDL Fasting     Status: Abnormal   Result Value Ref Range    Cholesterol 215 (H) <200 mg/dL    Triglycerides 78 <150 mg/dL    Direct Measure HDL 81 >=40 mg/dL    LDL Cholesterol Calculated 118 (H) <=100 mg/dL    Non HDL Cholesterol 134 (H) <130 mg/dL    Patient Fasting > 8hrs? Yes     Narrative    Cholesterol  Desirable:  <200 mg/dL    Triglycerides  Normal:  Less than 150 mg/dL  Borderline High:  150-199 mg/dL  High:  200-499 mg/dL  Very High:  Greater than or equal to 500 mg/dL    Direct Measure HDL  Female:  Greater than or equal to 50 mg/dL   Male:  Greater than or equal to 40 mg/dL    LDL Cholesterol  Desirable:  <100mg/dL  Above Desirable:  100-129 mg/dL   Borderline High:  130-159 mg/dL   High:  160-189 mg/dL   Very High:  >= 190 mg/dL    Non HDL Cholesterol  Desirable:  130 mg/dL  Above Desirable:  130-159 mg/dL  Borderline High:  160-189 mg/dL  High:  190-219 mg/dL  Very High:  Greater than or equal to  220 mg/dL   Hepatic function panel     Status: Normal   Result Value Ref Range    Protein Total 7.7 6.4 - 8.3 g/dL    Albumin 4.4 3.5 - 5.2 g/dL    Bilirubin Total 0.7 <=1.2 mg/dL    Alkaline Phosphatase 59 40 - 150 U/L    AST 36 0 - 45 U/L    ALT 43 0 - 70 U/L    Bilirubin Direct <0.20 0.00 - 0.30 mg/dL   Hepatitis C Screen Reflex to HCV RNA Quant and Genotype     Status: Normal   Result Value Ref Range    Hepatitis C Antibody Nonreactive Nonreactive       ASSESSMENT/PLAN:     Encounter for annual physical exam  - BASIC METABOLIC PANEL  - Lipid panel reflex to direct LDL Fasting  - Hepatic function panel  - REVIEW OF HEALTH MAINTENANCE PROTOCOL ORDERS    Type 2 diabetes mellitus without complication, without long-term current use of insulin (H)  - Albumin Random Urine Quantitative with Creat Ratio  - HEMOGLOBIN A1C  - BASIC METABOLIC PANEL  - metFORMIN (GLUCOPHAGE XR) 500 MG 24 hr tablet; Take 1 tablet (500 mg) by mouth daily (with dinner)  - REVIEW OF HEALTH MAINTENANCE PROTOCOL ORDERS    HTN, goal below 140/90  - Albumin Random Urine Quantitative with Creat Ratio  - BASIC METABOLIC PANEL  - benazepril-hydrochlorothiazide (LOTENSIN HCT) 20-25 MG tablet; Take 1 tablet by mouth daily  - REVIEW OF HEALTH MAINTENANCE PROTOCOL ORDERS    Hyperlipidemia LDL goal <100  - Lipid panel reflex to direct LDL Fasting  - Hepatic function panel  - atorvastatin (LIPITOR) 10 MG tablet; Take 1 tablet (10 mg) by mouth daily  - REVIEW OF HEALTH MAINTENANCE PROTOCOL ORDERS    Chronic hepatitis B (H)  - Hepatic function panel  - REVIEW OF HEALTH MAINTENANCE PROTOCOL ORDERS    Screening for diabetic peripheral neuropathy  - FOOT EXAM    Need for hepatitis C screening test  - Hepatitis C Screen Reflex to HCV RNA Quant and Genotype     Patient has been advised of split billing requirements and indicates understanding: Yes      Counseling  Special attention given to:        Regular exercise       Healthy diet/nutrition       Consider Hep C  "screening for all patients one time for ages 18-79 years       The 10-year ASCVD risk score (Aleksey RAMIREZ, et al., 2019) is: 21.2%    Values used to calculate the score:      Age: 64 years      Sex: Male      Is Non- : No      Diabetic: Yes      Tobacco smoker: No      Systolic Blood Pressure: 133 mmHg      Is BP treated: Yes      HDL Cholesterol: 81 mg/dL      Total Cholesterol: 215 mg/dL      BMI  Estimated body mass index is 26.12 kg/m  as calculated from the following:    Height as of this encounter: 1.619 m (5' 3.75\").    Weight as of this encounter: 68.5 kg (151 lb).   Weight management plan: diet and exercise.      He reports that he has never smoked. He has never used smokeless tobacco.            Signed Electronically by: Ky Stone MD  "

## 2024-07-02 ENCOUNTER — TELEPHONE (OUTPATIENT)
Dept: FAMILY MEDICINE | Facility: CLINIC | Age: 64
End: 2024-07-02
Payer: COMMERCIAL

## 2024-07-02 NOTE — TELEPHONE ENCOUNTER
Patient Returning Call    Reason for call:  Pt stopped in wanting to discuss lab results from Annual visit in June     Information relayed to patient:  Someone from [facility] team would call back to discuss    Patient has additional questions:  No      Could we send this information to you in [facility] or would you prefer to receive a phone call?:   Patient would prefer a phone call   Okay to leave a detailed message?: Yes at Cell number on file:    Telephone Information:   Mobile [phone]

## 2024-07-02 NOTE — LETTER
July 2, 2024    Jose Gaines  [address]  [place] [address]      Dear ,    We are writing to inform you of your test results.    Test for Hepatitis C is negative. Liver function is normal.   Cholesterol panel is abnormal, but almost similar to your previous test. Take Atorvastatin at the same dose.   Basic  metabolic panel shows normal glucose level and normal electrolytes (consisting of potassium, chloride, sodium and calcium) and normal kidney panel.   Normal urine albumin test. It means no early sign of kidney disease despite your hypertension and diabetes.   Diabetes is well-controlled. Continue current medications.     For any questions, you may call my office at [phone].    Sincerely,         Ky Stone MD  Internal Medicine

## 2024-07-03 NOTE — TELEPHONE ENCOUNTER
I was not able to reach the patient via phone.    Will patient tomorrow morning.    Result note letter placed in TC basket for mailing.

## 2024-08-21 ENCOUNTER — OFFICE VISIT (OUTPATIENT)
Dept: URGENT CARE | Facility: URGENT CARE | Age: 64
End: 2024-08-21
Payer: COMMERCIAL

## 2024-08-21 VITALS
OXYGEN SATURATION: 95 % | BODY MASS INDEX: 26.21 KG/M2 | HEART RATE: 69 BPM | RESPIRATION RATE: 16 BRPM | TEMPERATURE: 98.1 F | DIASTOLIC BLOOD PRESSURE: 88 MMHG | WEIGHT: 151.5 LBS | SYSTOLIC BLOOD PRESSURE: 152 MMHG

## 2024-08-21 DIAGNOSIS — H10.13 ALLERGIC CONJUNCTIVITIS, BILATERAL: Primary | ICD-10-CM

## 2024-08-21 DIAGNOSIS — R05.1 ACUTE COUGH: ICD-10-CM

## 2024-08-21 DIAGNOSIS — J30.2 SEASONAL ALLERGIC RHINITIS, UNSPECIFIED TRIGGER: ICD-10-CM

## 2024-08-21 DIAGNOSIS — J01.90 ACUTE SINUSITIS WITH SYMPTOMS > 10 DAYS: ICD-10-CM

## 2024-08-21 PROCEDURE — 99213 OFFICE O/P EST LOW 20 MIN: CPT | Performed by: STUDENT IN AN ORGANIZED HEALTH CARE EDUCATION/TRAINING PROGRAM

## 2024-08-21 RX ORDER — BENZONATATE 100 MG/1
100 CAPSULE ORAL 3 TIMES DAILY PRN
Qty: 30 CAPSULE | Refills: 0 | Status: SHIPPED | OUTPATIENT
Start: 2024-08-21

## 2024-08-21 RX ORDER — OLOPATADINE HYDROCHLORIDE 1 MG/ML
1 SOLUTION/ DROPS OPHTHALMIC 2 TIMES DAILY
Qty: 5 ML | Refills: 0 | Status: SHIPPED | OUTPATIENT
Start: 2024-08-21

## 2024-08-21 RX ORDER — CETIRIZINE HYDROCHLORIDE 10 MG/1
10 TABLET ORAL DAILY
Qty: 30 TABLET | Refills: 0 | Status: SHIPPED | OUTPATIENT
Start: 2024-08-21

## 2024-08-21 NOTE — PROGRESS NOTES
ASSESSMENT & PLAN:   Diagnoses and all orders for this visit:  Allergic conjunctivitis, bilateral  -     olopatadine (PATANOL) 0.1 % ophthalmic solution; Place 1 drop into both eyes 2 times daily.  Seasonal allergic rhinitis, unspecified trigger  -     cetirizine (ZYRTEC) 10 MG tablet; Take 1 tablet (10 mg) by mouth daily.  Acute sinusitis with symptoms > 10 days  -     amoxicillin-clavulanate (AUGMENTIN) 875-125 MG tablet; Take 1 tablet by mouth 2 times daily for 10 days.  Acute cough  -     benzonatate (TESSALON) 100 MG capsule; Take 1 capsule (100 mg) by mouth 3 times daily as needed for cough.    URI symptoms x 2 weeks. Exam is benign, vitals stable. With duration of symptoms and double worsening, will treat for sinusitis with Augmentin x 10 days. Will treat allergies with Zyrtec, continue Flonase, Patanol eyedrops. Tessalon as needed for cough.    At the end of the encounter, I discussed results, diagnosis, medications. Discussed red flags for immediate return to clinic/ER, as well as indications for follow up if no improvement. Patient and/or caregiver understood and agreed to plan. Patient was stable for discharge.    There are no Patient Instructions on file for this visit.    No follow-ups on file.    ------------------------------------------------------------------------  SUBJECTIVE  History was obtained from patient.    Patient presents with:  Urgent Care  URI: Think have an infection d/t sx for couple weeks now with a runny nose, itching eyes, a lot of coughing    HPI  Jose Gaines is a(n) 64 year old male presenting to urgent care for URI symptoms x 2 weeks. Reports congestion, sinus pressure, cough due to drainage and itchy throat. Developed subjective fever recently. No shortness of breath. Eyes feel itchy and watery. Using Flonase with some improvement. No history of asthma. Has used benadryl in the past, none recently.    Review of Systems    Current Outpatient Medications   Medication Sig  Dispense Refill    acetaminophen (TYLENOL) 500 MG tablet Take 1-2 tablets (500-1,000 mg) by mouth every 6 hours as needed for mild pain or fever 30 tablet 0    amoxicillin-clavulanate (AUGMENTIN) 875-125 MG tablet Take 1 tablet by mouth 2 times daily for 10 days. 20 tablet 0    atorvastatin (LIPITOR) 10 MG tablet Take 1 tablet (10 mg) by mouth daily 90 tablet 3    benazepril-hydrochlorothiazide (LOTENSIN HCT) 20-25 MG tablet Take 1 tablet by mouth daily 90 tablet 1    benzonatate (TESSALON) 100 MG capsule Take 1 capsule (100 mg) by mouth 3 times daily as needed for cough. 30 capsule 0    cetirizine (ZYRTEC) 10 MG tablet Take 1 tablet (10 mg) by mouth daily. 30 tablet 0    diclofenac (VOLTAREN) 1 % topical gel Place 4 g onto the skin 4 times daily 100 g 11    fluticasone (FLONASE) 50 MCG/ACT nasal spray SHAKE LQ AND U 1 SPR IEN QD      metFORMIN (GLUCOPHAGE XR) 500 MG 24 hr tablet Take 1 tablet (500 mg) by mouth daily (with dinner) 90 tablet 1    multivitamin  with lutein (OCUVITE WITH LTEIN) CAPS per capsule Take 1 capsule by mouth daily 30 capsule 11    olopatadine (PATANOL) 0.1 % ophthalmic solution Place 1 drop into both eyes 2 times daily. 5 mL 0    olopatadine (PATANOL) 0.1 % ophthalmic solution Place 1 drop into the right eye 2 times daily 1 mL 11    aspirin 81 MG EC tablet Take 81 mg by mouth daily (Patient not taking: Reported on 6/7/2024)       Problem List:  2024-06: Type 2 diabetes mellitus without complication, without long-  term current use of insulin (H)  2020-09: Clonic hemifacial spasm, right  2020-09: HSV-2 seropositive  2019-11: Pain of meniscus of right knee  2018-02: Overweight  2017-03: Language barrier  2015-09: Chronic hepatitis B (H)  2015-07: Oral ulcer  2015-03: HTN, goal below 140/90  2011-02: Internal hemorrhoid  2010-10: CARDIOVASCULAR SCREENING; LDL GOAL LESS THAN 130  2010-08: Seasonal allergic rhinitis  2008-10: Hyperlipidemia LDL goal <100    Allergies   Allergen Reactions     Contrast Dye      PN: LW CM1: >>> NO CONTRAST ADVERSE REACTION <<<     Reaction :         OBJECTIVE  Vitals:    08/21/24 1642   BP: (!) 152/88   BP Location: Left arm   Patient Position: Sitting   Cuff Size: Adult Regular   Pulse: 69   Resp: 16   Temp: 98.1  F (36.7  C)   TempSrc: Tympanic   SpO2: 95%   Weight: 68.7 kg (151 lb 8 oz)     Physical Exam   GENERAL: healthy, alert, no acute distress.   PSYCH: mentation appears normal. Normal affect  HEAD: normocephalic, atraumatic.  EYE: PERRL. EOMs intact. No scleral injection bilaterally.   EAR: external ear normal. Bilateral ear canals normal and nonpainful. Bilateral TM intact, pearly, translucent without bulging.  NOSE: external nose atraumatic without lesions.  OROPHARYNX: moist mucous membranes. Posterior oropharynx without erythema or exudate. Uvula midline. Patent airway.  LUNGS: no increased work of breathing. Clear lung sounds bilaterally. No wheezing, rhonchi, or rales.   CV: regular rate and rhythm. No clicks, murmurs, or rubs.    No results found for any visits on 08/21/24.

## 2024-09-05 ENCOUNTER — TELEPHONE (OUTPATIENT)
Dept: FAMILY MEDICINE | Facility: CLINIC | Age: 64
End: 2024-09-05
Payer: COMMERCIAL

## 2024-09-05 NOTE — TELEPHONE ENCOUNTER
Patient Quality Outreach    Patient is due for the following:   Diabetes -  Eye Exam  Hypertension -  BP check      Topic Date Due    Hepatitis B Vaccine (2 of 3 - Risk 3-dose series) 11/07/2006    Zoster (Shingles) Vaccine (2 of 2) 07/10/2023    Flu Vaccine (1) 09/01/2024    COVID-19 Vaccine (6 - 2023-24 season) 09/01/2024       Next Steps:   Schedule a lab only visit for immunization update and bp check     Type of outreach:    Sent [employer] message.      Questions for provider review:    None           Daja Noble MA

## 2024-11-09 ENCOUNTER — HEALTH MAINTENANCE LETTER (OUTPATIENT)
Age: 64
End: 2024-11-09

## 2024-11-15 NOTE — PROGRESS NOTES
"ASSESSMENT & PLAN    Jose was seen today for pain.    Diagnoses and all orders for this visit:    Primary osteoarthritis of right knee  -     Large Joint Injection/Arthocentesis      Exacerbation of chronic problem.  Injection done today.  Suggested XR for update, pt declined.  See below.  Questions answered. Discussed signs and symptoms that may indicate more serious issues; the patient was instructed to seek appropriate care if noted. Jose indicates understanding of these issues and agrees with the plan.        See Patient Instructions  Patient Instructions   Right knee arthritis reviewed.  Discussed nature of degenerative arthrosis (\"arthritis\") of the knee.  Symptom treatment generally includes over-the-counter medications, ice or heat, topical treatments, and rest if needed.  May use compression or bracing for comfort. Ok to use the sleeve that you have.  There are potential benefits of rehabilitation, to maintain or improve function at the knee. If interested in physical therapy referral, contact clinic.  There are benefits of exercise and remaining active. Also, weight loss (if applicable) can reduce pressure at the knee.  Discussed injection therapy. This typically includes steroid (cortisone), vs viscosupplement (\"lubricating shot\"). Right knee steroid injection done today.  Additionally, future consideration of referral to orthopedic surgery for further evaluation and discussion of additional treatment options. This is particularly the case when other treatment options have been exhausted.    X-rays deferred today, but would obtain WB x-ray if pain not improved with injection or recurs in the future, with last x-ray 5 years ago.  Right knee steroid injection done today, monitor up to 2 weeks for maximal benefit.  Letter for work today.    If you have any further questions for your physician or physician s care team you can contact them thru [employer] or by calling [phone].        Injection " Discharge Instructions    You may shower, however avoid swimming, tub baths or hot tubs for 24 hours following your procedure  You may have a mild to moderate increase in pain for several days following the injection.  It may take up to 14 days for the steroid medication to start working although you may feel the effect as early as a few days after the procedure.  You may use ice packs for 10-15 minutes, 3 to 4 times a day at the injection site for comfort  You may use anti-inflammatory medications (such as Ibuprofen or Aleve or Advil) if you are able to take safely, or acetaminophen (Tylenol) for pain control if necessary  If you were fasting, you may resume your normal diet and medications after the procedure  If you have diabetes, check your blood sugar more frequently than usual as your blood sugar may be higher than normal for 10-14 days following a steroid injection. Contact your doctor who manages your diabetes if your blood sugar is higher than usual    If you experience any of the following, call the clinic @ [phone]  - Fever over 100 degrees F  - Swelling, bleeding, redness, drainage, warmth at the injection site  - New or worsening pain      Jorge L Cuevas [facility]    -----  Chief Complaint   Patient presents with    Right Knee - Pain       SUBJECTIVE  Jose Gaines is a/an 64 year old male who is seen as a self referral for evaluation of right knee.   No  was used in todays visit, patient declined .    The patient is seen by themselves.    Onset: Many years(s) ago. Reports insidious onset without acute precipitating event.  Location of Pain: right knee, anterior knee, diffuse anterior pain  Worsened by: Going up and down stairs  Better with:  Treatments tried: Compression sleeve  Associated symptoms: no distal numbness or tingling; denies swelling or warmth    Orthopedic/Surgical history: YES - Notes a cyst removal and  "infection, 19+ years ago  Previously seen with Dr. Nevarez where right knee steroid injections were performed. Notes that he got a good 3 years of relief with this injection.  Social History/Occupation: Running     **  Above information per rooming staff.  Additional history:  Pain off/on. No clear swelling.  Pain deep.        REVIEW OF SYSTEMS:  Review of Systems    OBJECTIVE:  Ht 1.626 m (5' 4\")   Wt 68.5 kg (151 lb)   BMI 25.92 kg/m           Right Knee exam    Inspection:   trace effusion   no ecchymosis    ROM:      Full active and passive ROM with flexion and extension    Patellar Motion:      Crepitus noted in the patellofemoral joint    Tender: none focal    Special Tests:      neg (-) anterior drawer       neg (-) posterior drawer       neg (-) varus        neg (-) valgus         RADIOLOGY:  Final results and radiologist's interpretation, available in the [facility] health record.  Images were reviewed with the patient in the office today.  My personal interpretation of the performed imaging: right knee arthrosis.      MR right knee without contrast 11/26/2019 7:31 AM     Techniques: Multiplanar multisequence imaging of the right knee was  obtained without administration of intra-articular or intravenous  contrast using routing protocol with additional T2* mapping coronal  sequences.     History: 59-year-old male with right knee pain for 6 to 7 months.     Comparison: Right knee radiograph 11/14/2019     Findings:     MENISCI:  Medial meniscus: Intact.  Lateral meniscus: Complex degenerative tearing of the anterior horn  and body of the lateral meniscus with likely meniscal fragments in the  lateral/femoral recess.     LIGAMENTS  Cruciate ligaments: Intact.  Medial supporting structures: Intact.  Lateral supporting structures: Intact.     EXTENSOR MECHANISM  Marked thickening of the patellar tendon with edema medially to  distally with a tiny bone fragment, suggestive of tendinosis with  likely " prior Osgood-Schlatter disease.     FLUID  Small to moderate joint effusion. No substantial Baker's cyst.     OSSEOUS and ARTICULAR STRUCTURES  Bones: No fracture, contusion, or osseous lesion is seen.     Patellofemoral compartment: On the median ridge of the patella, there  is moderate cartilage loss with focal areas of moderate to high grade  cartilage loss. Patellar subchondral cystic changes adjacent to area  of cartilage loss.     Medial compartment: No full-thickness cartilage loss.     Lateral compartment: There is diffuse high-grade thinning of cartilage  with focal areas of high-grade cartilage loss.     ANCILLARY FINDINGS  None.                                                                      Impression:  1. Small to moderate size right knee joint effusion.  2. Osteoarthrosis of the right knee, most prominent in the lateral and  patellofemoral compartments with focal areas of high-grade cartilage  loss, as described above.  3. Complex degenerative tearing of the anterior horn and body of the  lateral meniscus with meniscal fragments in the lateral femoral  recess.  4. The ACL, PCL, medial and lateral supporting structures, and medial  meniscus are intact.     I have personally reviewed the examination and initial interpretation  and I agree with the findings.     JARRED MILES MD        Examination:  XR KNEE RT 3 VW     Date:  11/14/2019 12:38 PM      Clinical Information: Pain of meniscus of right knee      Additional Information: none     Comparison: none                                                                      Impression:  Mild-to-moderate tricompartmental degenerative arthrosis, greatest in  the lateral and patellofemoral compartments, where there is partial  joint space narrowing and marginal osteophytes. Moderate knee joint  effusion. No fracture or bone lesion. Normal periarticular soft  tissues.     ANNE MARIE BIRMINGHAM MD              Large Joint Injection/Arthocentesis: R knee  joint    Date/Time: 11/16/2024 9:28 AM    Performed by: Jorge L Cuevas DO  Authorized by: Jorge L Cuevas DO    Indications:  Osteoarthritis and pain  Needle Size:  25 G  Guidance: landmark guided    Approach:  Anteromedial  Location:  Knee      Medications:  40 mg triamcinolone 40 MG/ML; 2 mL lidocaine 1 %  Outcome:  Tolerated well, no immediate complications  Procedure discussed: discussed risks, benefits, and alternatives    Consent Given by:  Patient  Timeout: timeout called immediately prior to procedure    Prep: patient was prepped and draped in usual sterile fashion          Review of prior external note(s) from - ortho surgery  Review of the result(s) of each unique test - imaging  Independent interpretation of a test performed by another physician/other qualified health care professional (not separately reported) - imaging

## 2024-11-16 ENCOUNTER — OFFICE VISIT (OUTPATIENT)
Dept: ORTHOPEDICS | Facility: CLINIC | Age: 64
End: 2024-11-16
Payer: COMMERCIAL

## 2024-11-16 VITALS — BODY MASS INDEX: 25.78 KG/M2 | HEIGHT: 64 IN | WEIGHT: 151 LBS

## 2024-11-16 DIAGNOSIS — M17.11 PRIMARY OSTEOARTHRITIS OF RIGHT KNEE: Primary | ICD-10-CM

## 2024-11-16 PROCEDURE — 99204 OFFICE O/P NEW MOD 45 MIN: CPT | Mod: 25 | Performed by: PEDIATRICS

## 2024-11-16 PROCEDURE — 20610 DRAIN/INJ JOINT/BURSA W/O US: CPT | Mod: RT | Performed by: PEDIATRICS

## 2024-11-16 RX ORDER — TRIAMCINOLONE ACETONIDE 40 MG/ML
40 INJECTION, SUSPENSION INTRA-ARTICULAR; INTRAMUSCULAR
Status: COMPLETED | OUTPATIENT
Start: 2024-11-16 | End: 2024-11-16

## 2024-11-16 RX ORDER — LIDOCAINE HYDROCHLORIDE 10 MG/ML
2 INJECTION, SOLUTION INFILTRATION; PERINEURAL
Status: COMPLETED | OUTPATIENT
Start: 2024-11-16 | End: 2024-11-16

## 2024-11-16 RX ADMIN — TRIAMCINOLONE ACETONIDE 40 MG: 40 INJECTION, SUSPENSION INTRA-ARTICULAR; INTRAMUSCULAR at 09:28

## 2024-11-16 RX ADMIN — LIDOCAINE HYDROCHLORIDE 2 ML: 10 INJECTION, SOLUTION INFILTRATION; PERINEURAL at 09:28

## 2024-11-16 NOTE — LETTER
November 16, 2024      Jose Gaines  [address]  [place] [facility] [address]        To Whom It May Concern:    Jose Gaines was seen on November 16, 2024.  Please excuse him due to appointment today.        Sincerely,            Jorge L Cuevas DO

## 2024-11-16 NOTE — PATIENT INSTRUCTIONS
"Right knee arthritis reviewed.  Discussed nature of degenerative arthrosis (\"arthritis\") of the knee.  Symptom treatment generally includes over-the-counter medications, ice or heat, topical treatments, and rest if needed.  May use compression or bracing for comfort. Ok to use the sleeve that you have.  There are potential benefits of rehabilitation, to maintain or improve function at the knee. If interested in physical therapy referral, contact clinic.  There are benefits of exercise and remaining active. Also, weight loss (if applicable) can reduce pressure at the knee.  Discussed injection therapy. This typically includes steroid (cortisone), vs viscosupplement (\"lubricating shot\"). Right knee steroid injection done today.  Additionally, future consideration of referral to orthopedic surgery for further evaluation and discussion of additional treatment options. This is particularly the case when other treatment options have been exhausted.    X-rays deferred today, but would obtain WB x-ray if pain not improved with injection or recurs in the future, with last x-ray 5 years ago.  Right knee steroid injection done today, monitor up to 2 weeks for maximal benefit.  Letter for work today.    If you have any further questions for your physician or physician s care team you can contact them thru [employer] or by calling [phone].        Injection Discharge Instructions    You may shower, however avoid swimming, tub baths or hot tubs for 24 hours following your procedure  You may have a mild to moderate increase in pain for several days following the injection.  It may take up to 14 days for the steroid medication to start working although you may feel the effect as early as a few days after the procedure.  You may use ice packs for 10-15 minutes, 3 to 4 times a day at the injection site for comfort  You may use anti-inflammatory medications (such as Ibuprofen or Aleve or Advil) if you are able to take safely, or " acetaminophen (Tylenol) for pain control if necessary  If you were fasting, you may resume your normal diet and medications after the procedure  If you have diabetes, check your blood sugar more frequently than usual as your blood sugar may be higher than normal for 10-14 days following a steroid injection. Contact your doctor who manages your diabetes if your blood sugar is higher than usual    If you experience any of the following, call the clinic @ [phone]  - Fever over 100 degrees F  - Swelling, bleeding, redness, drainage, warmth at the injection site  - New or worsening pain

## 2024-11-16 NOTE — LETTER
"11/16/2024      Jose Gaines  [address]  [place] [address]      Dear Colleague,    Thank you for referring your patient, Jose Gaines, to the [facility]. Please see a copy of my visit note below.    ASSESSMENT & PLAN    Jose was seen today for pain.    Diagnoses and all orders for this visit:    Primary osteoarthritis of right knee  -     Large Joint Injection/Arthocentesis      Exacerbation of chronic problem.  Injection done today.  Suggested XR for update, pt declined.  See below.  Questions answered. Discussed signs and symptoms that may indicate more serious issues; the patient was instructed to seek appropriate care if noted. Jose indicates understanding of these issues and agrees with the plan.        See Patient Instructions  Patient Instructions   Right knee arthritis reviewed.  Discussed nature of degenerative arthrosis (\"arthritis\") of the knee.  Symptom treatment generally includes over-the-counter medications, ice or heat, topical treatments, and rest if needed.  May use compression or bracing for comfort. Ok to use the sleeve that you have.  There are potential benefits of rehabilitation, to maintain or improve function at the knee. If interested in physical therapy referral, contact clinic.  There are benefits of exercise and remaining active. Also, weight loss (if applicable) can reduce pressure at the knee.  Discussed injection therapy. This typically includes steroid (cortisone), vs viscosupplement (\"lubricating shot\"). Right knee steroid injection done today.  Additionally, future consideration of referral to orthopedic surgery for further evaluation and discussion of additional treatment options. This is particularly the case when other treatment options have been exhausted.    X-rays deferred today, but would obtain WB x-ray if pain not improved with injection or recurs in the future, with last x-ray 5 years ago.  Right knee steroid " injection done today, monitor up to 2 weeks for maximal benefit.  Letter for work today.    If you have any further questions for your physician or physician s care team you can contact them thru [employer] or by calling [phone].        Injection Discharge Instructions    You may shower, however avoid swimming, tub baths or hot tubs for 24 hours following your procedure  You may have a mild to moderate increase in pain for several days following the injection.  It may take up to 14 days for the steroid medication to start working although you may feel the effect as early as a few days after the procedure.  You may use ice packs for 10-15 minutes, 3 to 4 times a day at the injection site for comfort  You may use anti-inflammatory medications (such as Ibuprofen or Aleve or Advil) if you are able to take safely, or acetaminophen (Tylenol) for pain control if necessary  If you were fasting, you may resume your normal diet and medications after the procedure  If you have diabetes, check your blood sugar more frequently than usual as your blood sugar may be higher than normal for 10-14 days following a steroid injection. Contact your doctor who manages your diabetes if your blood sugar is higher than usual    If you experience any of the following, call the clinic @ [phone]  - Fever over 100 degrees F  - Swelling, bleeding, redness, drainage, warmth at the injection site  - New or worsening pain      [facility]    -----  Chief Complaint   Patient presents with     Right Knee - Pain       SUBJECTIVE  Jose Gaines is a/an 64 year old male who is seen as a self referral for evaluation of right knee.   No  was used in todays visit, patient declined .    The patient is seen by themselves.    Onset: Many years(s) ago. Reports insidious onset without acute precipitating event.  Location of Pain: right knee, anterior knee, diffuse  "anterior pain  Worsened by: Going up and down stairs  Better with:  Treatments tried: Compression sleeve  Associated symptoms: no distal numbness or tingling; denies swelling or warmth    Orthopedic/Surgical history: YES - Notes a cyst removal and infection, 19+ years ago  Previously seen with Dr. Nevarez where right knee steroid injections were performed. Notes that he got a good 3 years of relief with this injection.  Social History/Occupation: Running     **  Above information per rooming staff.  Additional history:  Pain off/on. No clear swelling.  Pain deep.        REVIEW OF SYSTEMS:  Review of Systems    OBJECTIVE:  Ht 1.626 m (5' 4\")   Wt 68.5 kg (151 lb)   BMI 25.92 kg/m           Right Knee exam    Inspection:   trace effusion   no ecchymosis    ROM:      Full active and passive ROM with flexion and extension    Patellar Motion:      Crepitus noted in the patellofemoral joint    Tender: none focal    Special Tests:      neg (-) anterior drawer       neg (-) posterior drawer       neg (-) varus        neg (-) valgus         RADIOLOGY:  Final results and radiologist's interpretation, available in the [facility] health record.  Images were reviewed with the patient in the office today.  My personal interpretation of the performed imaging: right knee arthrosis.      MR right knee without contrast 11/26/2019 7:31 AM     Techniques: Multiplanar multisequence imaging of the right knee was  obtained without administration of intra-articular or intravenous  contrast using routing protocol with additional T2* mapping coronal  sequences.     History: 59-year-old male with right knee pain for 6 to 7 months.     Comparison: Right knee radiograph 11/14/2019     Findings:     MENISCI:  Medial meniscus: Intact.  Lateral meniscus: Complex degenerative tearing of the anterior horn  and body of the lateral meniscus with likely meniscal fragments in the  lateral/femoral recess.     LIGAMENTS  Cruciate ligaments: " Intact.  Medial supporting structures: Intact.  Lateral supporting structures: Intact.     EXTENSOR MECHANISM  Marked thickening of the patellar tendon with edema medially to  distally with a tiny bone fragment, suggestive of tendinosis with  likely prior Osgood-Schlatter disease.     FLUID  Small to moderate joint effusion. No substantial Baker's cyst.     OSSEOUS and ARTICULAR STRUCTURES  Bones: No fracture, contusion, or osseous lesion is seen.     Patellofemoral compartment: On the median ridge of the patella, there  is moderate cartilage loss with focal areas of moderate to high grade  cartilage loss. Patellar subchondral cystic changes adjacent to area  of cartilage loss.     Medial compartment: No full-thickness cartilage loss.     Lateral compartment: There is diffuse high-grade thinning of cartilage  with focal areas of high-grade cartilage loss.     ANCILLARY FINDINGS  None.                                                                      Impression:  1. Small to moderate size right knee joint effusion.  2. Osteoarthrosis of the right knee, most prominent in the lateral and  patellofemoral compartments with focal areas of high-grade cartilage  loss, as described above.  3. Complex degenerative tearing of the anterior horn and body of the  lateral meniscus with meniscal fragments in the lateral femoral  recess.  4. The ACL, PCL, medial and lateral supporting structures, and medial  meniscus are intact.     I have personally reviewed the examination and initial interpretation  and I agree with the findings.     JARRED MILES MD        Examination:  XR KNEE RT 3 VW     Date:  11/14/2019 12:38 PM      Clinical Information: Pain of meniscus of right knee      Additional Information: none     Comparison: none                                                                      Impression:  Mild-to-moderate tricompartmental degenerative arthrosis, greatest in  the lateral and patellofemoral compartments,  where there is partial  joint space narrowing and marginal osteophytes. Moderate knee joint  effusion. No fracture or bone lesion. Normal periarticular soft  tissues.     ANNE MARIE BIRMINGHAM MD              Large Joint Injection/Arthocentesis: R knee joint    Date/Time: 11/16/2024 9:28 AM    Performed by: Jorge L Cuevas DO  Authorized by: Jorge L Cuevas DO    Indications:  Osteoarthritis and pain  Needle Size:  25 G  Guidance: landmark guided    Approach:  Anteromedial  Location:  Knee      Medications:  40 mg triamcinolone 40 MG/ML; 2 mL lidocaine 1 %  Outcome:  Tolerated well, no immediate complications  Procedure discussed: discussed risks, benefits, and alternatives    Consent Given by:  Patient  Timeout: timeout called immediately prior to procedure    Prep: patient was prepped and draped in usual sterile fashion          Review of prior external note(s) from - ortho surgery  Review of the result(s) of each unique test - imaging  Independent interpretation of a test performed by another physician/other qualified health care professional (not separately reported) - imaging             Again, thank you for allowing me to participate in the care of your patient.        Sincerely,        Jorge L Cuevas DO

## 2024-12-02 ENCOUNTER — ANCILLARY PROCEDURE (OUTPATIENT)
Dept: GENERAL RADIOLOGY | Facility: CLINIC | Age: 64
End: 2024-12-02
Attending: FAMILY MEDICINE
Payer: COMMERCIAL

## 2024-12-02 ENCOUNTER — OFFICE VISIT (OUTPATIENT)
Dept: FAMILY MEDICINE | Facility: CLINIC | Age: 64
End: 2024-12-02
Payer: COMMERCIAL

## 2024-12-02 VITALS
WEIGHT: 148 LBS | SYSTOLIC BLOOD PRESSURE: 152 MMHG | RESPIRATION RATE: 16 BRPM | DIASTOLIC BLOOD PRESSURE: 69 MMHG | OXYGEN SATURATION: 95 % | HEART RATE: 94 BPM | HEIGHT: 64 IN | BODY MASS INDEX: 25.27 KG/M2 | TEMPERATURE: 98.3 F

## 2024-12-02 DIAGNOSIS — R05.2 SUBACUTE COUGH: Primary | ICD-10-CM

## 2024-12-02 DIAGNOSIS — R05.2 SUBACUTE COUGH: ICD-10-CM

## 2024-12-02 DIAGNOSIS — I10 HYPERTENSION GOAL BP (BLOOD PRESSURE) < 140/90: ICD-10-CM

## 2024-12-02 DIAGNOSIS — J30.2 SEASONAL ALLERGIC RHINITIS, UNSPECIFIED TRIGGER: ICD-10-CM

## 2024-12-02 PROCEDURE — 99214 OFFICE O/P EST MOD 30 MIN: CPT | Performed by: FAMILY MEDICINE

## 2024-12-02 PROCEDURE — 71046 X-RAY EXAM CHEST 2 VIEWS: CPT | Mod: TC | Performed by: RADIOLOGY

## 2024-12-02 RX ORDER — ALBUTEROL SULFATE 90 UG/1
1-2 INHALANT RESPIRATORY (INHALATION) EVERY 4 HOURS PRN
Qty: 18 G | Refills: 0 | Status: SHIPPED | OUTPATIENT
Start: 2024-12-02

## 2024-12-02 RX ORDER — LOSARTAN POTASSIUM AND HYDROCHLOROTHIAZIDE 25; 100 MG/1; MG/1
1 TABLET ORAL DAILY
Qty: 90 TABLET | Refills: 0 | Status: SHIPPED | OUTPATIENT
Start: 2024-12-02

## 2024-12-02 RX ORDER — MONTELUKAST SODIUM 10 MG/1
10 TABLET ORAL AT BEDTIME
Qty: 30 TABLET | Refills: 3 | Status: SHIPPED | OUTPATIENT
Start: 2024-12-02

## 2024-12-02 ASSESSMENT — ENCOUNTER SYMPTOMS
COUGH: 1
WHEEZING: 1

## 2024-12-02 NOTE — PROGRESS NOTES
ASSESSMENT / PLAN:  (R05.2) Subacute cough  (primary encounter diagnosis)  Comment: allegies/dust/ALLERGIC RHINITIS vs ? Xray looks ok  Plan: XR Chest 2 Views, Adult Allergy/Asthma          Referral, montelukast (SINGULAIR) 10         MG tablet, albuterol (PROAIR HFA/PROVENTIL         HFA/VENTOLIN HFA) 108 (90 Base) MCG/ACT inhaler        Follow-up allergist. Will start albuterol prn and singulair. Reveiwed risks and side effects of medication  Worsneing shortness of breath/fevers or chills/etc. To er. Call/email with questions/concerns      (J30.2) Seasonal allergic rhinitis, unspecified trigger  Plan: Adult Allergy/Asthma  Referral,         montelukast (SINGULAIR) 10 MG tablet        Needs help    (I10) Hypertension goal BP (blood pressure) < 140/90  Comment: unclear if benazpril causing cough.  Plan: losartan-hydrochlorothiazide (HYZAAR) 100-25 MG        tablet        Will change to losartan. Reveiwed risks and side effects of medication  Follow-up pmd next month for blood pressure check/labs.       Yolanda Garcia is a 64 year old, presenting for the following health issues:    Cough. 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   New building - 6 months.  Past 3months. Never smoked. No sick contacts. Dry cough.   No history asthma. No MDI in past.  Sneezing and itchy eyes. No wheezing. No shortness of breath.  Zyrtec and flonase -not helpful.  No allergist in past.   No rashes. No fevers or chills. No sinus pressure/headaches.   No TB contacts.     No chest pain or gerd symptoms.      12/2/2024     3:25 PM   Additional Questions   Roomed by Chio   Accompanied by self     Cough  Associated symptoms include wheezing.   Allergies  Associated symptoms include coughing.   History of Present Illness       Reason for visit:  I have cougt   He is taking medications regularly.                     Objective    BP (!) 152/69   Pulse 94   Temp 98.3  F (36.8  C) (Tympanic)    "Resp 16   Ht 1.626 m (5' 4\")   Wt 67.1 kg (148 lb)   SpO2 95%   BMI 25.40 kg/m    Body mass index is 25.4 kg/m .  Physical Exam   GENERAL: alert and no distress  EYES: Eyes grossly normal to inspection, PERRL and conjunctivae and sclerae normal  HENT: ear canals and TM's normal and mouth without ulcers or lesions  HENT: CLEAR discontinue   NECK: no adenopathy, no asymmetry, masses, or scars  RESP: lungs clear to auscultation - no rales, rhonchi or wheezes  CV: regular rate and rhythm, normal S1 S2, no S3 or S4, no murmur, click or rub, no peripheral edema   ABDOMEN: soft, nontender, no hepatosplenomegaly, no masses and bowel sounds normal  MS: no gross musculoskeletal defects noted, no edema  PSYCH: mentation appears normal, affect normal/bright            Signed Electronically by: Chago Johnson MD    "